# Patient Record
Sex: MALE | Race: WHITE | Employment: FULL TIME | URBAN - NONMETROPOLITAN AREA
[De-identification: names, ages, dates, MRNs, and addresses within clinical notes are randomized per-mention and may not be internally consistent; named-entity substitution may affect disease eponyms.]

---

## 2019-05-22 ENCOUNTER — HOSPITAL ENCOUNTER (OUTPATIENT)
Age: 49
Discharge: HOME | End: 2019-05-22
Payer: COMMERCIAL

## 2019-05-22 DIAGNOSIS — E11.9 TYPE 2 DIABETES MELLITUS WITHOUT COMPLICATION, WITHOUT LONG-TERM CURRENT USE OF INSULIN (HCC): Chronic | ICD-10-CM

## 2019-05-22 LAB
-: ABNORMAL
ALT SERPL-CCNC: 46 U/L (ref 5–41)
AMORPHOUS: ABNORMAL
ANION GAP SERPL CALCULATED.3IONS-SCNC: 11 MMOL/L (ref 9–17)
AST SERPL-CCNC: 44 U/L
BACTERIA: ABNORMAL
BILIRUBIN URINE: ABNORMAL
BUN BLDV-MCNC: 16 MG/DL (ref 6–20)
BUN/CREAT BLD: 25 (ref 9–20)
CALCIUM SERPL-MCNC: 9.6 MG/DL (ref 8.6–10.4)
CASTS UA: ABNORMAL /LPF
CHLORIDE BLD-SCNC: 95 MMOL/L (ref 98–107)
CHOLESTEROL, FASTING: 135 MG/DL
CHOLESTEROL/HDL RATIO: 2.8
CO2: 30 MMOL/L (ref 20–31)
COLOR: YELLOW
COMMENT UA: ABNORMAL
CREAT SERPL-MCNC: 0.63 MG/DL (ref 0.7–1.2)
CREATININE URINE: 144.7 MG/DL (ref 39–259)
CRYSTALS, UA: ABNORMAL /HPF
EPITHELIAL CELLS UA: ABNORMAL /HPF (ref 0–5)
GFR AFRICAN AMERICAN: >60 ML/MIN
GFR NON-AFRICAN AMERICAN: >60 ML/MIN
GFR SERPL CREATININE-BSD FRML MDRD: ABNORMAL ML/MIN/{1.73_M2}
GFR SERPL CREATININE-BSD FRML MDRD: ABNORMAL ML/MIN/{1.73_M2}
GLUCOSE FASTING: 208 MG/DL (ref 70–99)
GLUCOSE URINE: ABNORMAL
HDLC SERPL-MCNC: 48 MG/DL
KETONES, URINE: NEGATIVE
LDL CHOLESTEROL: 50 MG/DL (ref 0–130)
LEUKOCYTE ESTERASE, URINE: NEGATIVE
MICROALBUMIN/CREAT 24H UR: 1245 MG/L
MICROALBUMIN/CREAT UR-RTO: 860 MCG/MG CREAT
MUCUS: ABNORMAL
NITRITE, URINE: NEGATIVE
OTHER OBSERVATIONS UA: ABNORMAL
PH UA: 5.5 (ref 5–9)
POTASSIUM SERPL-SCNC: 4.8 MMOL/L (ref 3.7–5.3)
PROTEIN UA: ABNORMAL
RBC UA: ABNORMAL /HPF (ref 0–2)
RENAL EPITHELIAL, UA: ABNORMAL /HPF
SODIUM BLD-SCNC: 136 MMOL/L (ref 135–144)
SPECIFIC GRAVITY UA: >1.03 (ref 1.01–1.02)
TRICHOMONAS: ABNORMAL
TRIGLYCERIDE, FASTING: 183 MG/DL
TURBIDITY: CLEAR
URINE HGB: NEGATIVE
UROBILINOGEN, URINE: NORMAL
VLDLC SERPL CALC-MCNC: ABNORMAL MG/DL (ref 1–30)
WBC UA: ABNORMAL /HPF (ref 0–5)
YEAST: ABNORMAL

## 2019-05-22 PROCEDURE — 84450 TRANSFERASE (AST) (SGOT): CPT

## 2019-05-22 PROCEDURE — 80048 BASIC METABOLIC PNL TOTAL CA: CPT

## 2019-05-22 PROCEDURE — 81001 URINALYSIS AUTO W/SCOPE: CPT

## 2019-05-22 PROCEDURE — 84460 ALANINE AMINO (ALT) (SGPT): CPT

## 2019-05-22 PROCEDURE — 82570 ASSAY OF URINE CREATININE: CPT

## 2019-05-22 PROCEDURE — 82043 UR ALBUMIN QUANTITATIVE: CPT

## 2019-05-22 PROCEDURE — 80061 LIPID PANEL: CPT

## 2019-05-22 PROCEDURE — 36415 COLL VENOUS BLD VENIPUNCTURE: CPT

## 2019-09-04 ENCOUNTER — APPOINTMENT (OUTPATIENT)
Dept: GENERAL RADIOLOGY | Age: 49
DRG: 287 | End: 2019-09-04
Attending: INTERNAL MEDICINE
Payer: COMMERCIAL

## 2019-09-04 ENCOUNTER — HOSPITAL ENCOUNTER (INPATIENT)
Age: 49
LOS: 3 days | Discharge: HOME | DRG: 287 | End: 2019-09-07
Attending: INTERNAL MEDICINE | Admitting: INTERNAL MEDICINE
Payer: COMMERCIAL

## 2019-09-04 ENCOUNTER — HOSPITAL ENCOUNTER (OUTPATIENT)
Age: 49
Discharge: HOME | DRG: 287 | End: 2019-09-04
Payer: COMMERCIAL

## 2019-09-04 ENCOUNTER — HOSPITAL ENCOUNTER (OUTPATIENT)
Dept: CT IMAGING | Age: 49
Discharge: HOME | DRG: 287 | End: 2019-09-06
Payer: COMMERCIAL

## 2019-09-04 DIAGNOSIS — R10.31 ABDOMINAL PAIN, RIGHT LOWER QUADRANT: ICD-10-CM

## 2019-09-04 DIAGNOSIS — R10.31 RIGHT LOWER QUADRANT ABDOMINAL PAIN: ICD-10-CM

## 2019-09-04 PROBLEM — I50.31 ACUTE DIASTOLIC CHF (CONGESTIVE HEART FAILURE), NYHA CLASS 1 (HCC): Status: ACTIVE | Noted: 2019-09-04

## 2019-09-04 LAB
ABSOLUTE EOS #: 0.26 K/UL (ref 0–0.44)
ABSOLUTE IMMATURE GRANULOCYTE: <0.03 K/UL (ref 0–0.3)
ABSOLUTE LYMPH #: 2.05 K/UL (ref 1.1–3.7)
ABSOLUTE MONO #: 0.83 K/UL (ref 0.1–1.2)
ALBUMIN SERPL-MCNC: 4.4 G/DL (ref 3.5–5.2)
ALBUMIN/GLOBULIN RATIO: 1.4 (ref 1–2.5)
ALP BLD-CCNC: 66 U/L (ref 40–129)
ALT SERPL-CCNC: 47 U/L (ref 5–41)
ANION GAP SERPL CALCULATED.3IONS-SCNC: 13 MMOL/L (ref 9–17)
AST SERPL-CCNC: 40 U/L
BASOPHILS # BLD: 1 % (ref 0–2)
BASOPHILS ABSOLUTE: 0.04 K/UL (ref 0–0.2)
BILIRUB SERPL-MCNC: 0.44 MG/DL (ref 0.3–1.2)
BNP INTERPRETATION: ABNORMAL
BUN BLDV-MCNC: 16 MG/DL (ref 6–20)
BUN/CREAT BLD: 24 (ref 9–20)
CALCIUM SERPL-MCNC: 9.9 MG/DL (ref 8.6–10.4)
CHLORIDE BLD-SCNC: 100 MMOL/L (ref 98–107)
CO2: 28 MMOL/L (ref 20–31)
CREAT SERPL-MCNC: 0.68 MG/DL (ref 0.7–1.2)
DIFFERENTIAL TYPE: NORMAL
EOSINOPHILS RELATIVE PERCENT: 3 % (ref 1–4)
ESTIMATED AVERAGE GLUCOSE: 186 MG/DL
GFR AFRICAN AMERICAN: >60 ML/MIN
GFR NON-AFRICAN AMERICAN: >60 ML/MIN
GFR SERPL CREATININE-BSD FRML MDRD: ABNORMAL ML/MIN/{1.73_M2}
GFR SERPL CREATININE-BSD FRML MDRD: ABNORMAL ML/MIN/{1.73_M2}
GLUCOSE BLD-MCNC: 213 MG/DL (ref 70–99)
HBA1C MFR BLD: 8.1 % (ref 4.8–5.9)
HCT VFR BLD CALC: 43.8 % (ref 40.7–50.3)
HEMOGLOBIN: 13.4 G/DL (ref 13–17)
IMMATURE GRANULOCYTES: 0 %
LYMPHOCYTES # BLD: 24 % (ref 24–43)
MCH RBC QN AUTO: 31.1 PG (ref 25.2–33.5)
MCHC RBC AUTO-ENTMCNC: 30.6 G/DL (ref 28.4–34.8)
MCV RBC AUTO: 101.6 FL (ref 82.6–102.9)
MONOCYTES # BLD: 10 % (ref 3–12)
NRBC AUTOMATED: 0 PER 100 WBC
PDW BLD-RTO: 12.8 % (ref 11.8–14.4)
PLATELET # BLD: 235 K/UL (ref 138–453)
PLATELET ESTIMATE: NORMAL
PMV BLD AUTO: 11.4 FL (ref 8.1–13.5)
POTASSIUM SERPL-SCNC: 4.8 MMOL/L (ref 3.7–5.3)
PRO-BNP: 788 PG/ML
RBC # BLD: 4.31 M/UL (ref 4.21–5.77)
RBC # BLD: NORMAL 10*6/UL
SEG NEUTROPHILS: 62 % (ref 36–65)
SEGMENTED NEUTROPHILS ABSOLUTE COUNT: 5.19 K/UL (ref 1.5–8.1)
SODIUM BLD-SCNC: 141 MMOL/L (ref 135–144)
THYROXINE, FREE: 1.42 NG/DL (ref 0.93–1.7)
TOTAL PROTEIN: 7.6 G/DL (ref 6.4–8.3)
TROPONIN INTERP: NORMAL
TROPONIN T: <0.03 NG/ML
TROPONIN, HIGH SENSITIVITY: NORMAL NG/L (ref 0–22)
TSH SERPL DL<=0.05 MIU/L-ACNC: 4.43 MIU/L (ref 0.3–5)
WBC # BLD: 8.4 K/UL (ref 3.5–11.3)
WBC # BLD: NORMAL 10*3/UL

## 2019-09-04 PROCEDURE — 6360000004 HC RX CONTRAST MEDICATION: Performed by: INTERNAL MEDICINE

## 2019-09-04 PROCEDURE — 71046 X-RAY EXAM CHEST 2 VIEWS: CPT

## 2019-09-04 PROCEDURE — 36415 COLL VENOUS BLD VENIPUNCTURE: CPT

## 2019-09-04 PROCEDURE — 84484 ASSAY OF TROPONIN QUANT: CPT

## 2019-09-04 PROCEDURE — 83880 ASSAY OF NATRIURETIC PEPTIDE: CPT

## 2019-09-04 PROCEDURE — 1200000000 HC SEMI PRIVATE

## 2019-09-04 PROCEDURE — 6360000002 HC RX W HCPCS

## 2019-09-04 PROCEDURE — 2500000003 HC RX 250 WO HCPCS

## 2019-09-04 PROCEDURE — 85025 COMPLETE CBC W/AUTO DIFF WBC: CPT

## 2019-09-04 PROCEDURE — 83036 HEMOGLOBIN GLYCOSYLATED A1C: CPT

## 2019-09-04 PROCEDURE — 74177 CT ABD & PELVIS W/CONTRAST: CPT

## 2019-09-04 PROCEDURE — 84439 ASSAY OF FREE THYROXINE: CPT

## 2019-09-04 PROCEDURE — 80053 COMPREHEN METABOLIC PANEL: CPT

## 2019-09-04 PROCEDURE — 84443 ASSAY THYROID STIM HORMONE: CPT

## 2019-09-04 RX ORDER — ALLOPURINOL 300 MG/1
300 TABLET ORAL DAILY
Status: DISCONTINUED | OUTPATIENT
Start: 2019-09-04 | End: 2019-09-07 | Stop reason: HOSPADM

## 2019-09-04 RX ORDER — POTASSIUM CHLORIDE 20 MEQ/1
40 TABLET, EXTENDED RELEASE ORAL PRN
Status: DISCONTINUED | OUTPATIENT
Start: 2019-09-04 | End: 2019-09-07 | Stop reason: HOSPADM

## 2019-09-04 RX ORDER — ACETAMINOPHEN 325 MG/1
650 TABLET ORAL EVERY 4 HOURS PRN
Status: DISCONTINUED | OUTPATIENT
Start: 2019-09-04 | End: 2019-09-07 | Stop reason: HOSPADM

## 2019-09-04 RX ORDER — DEXTROSE MONOHYDRATE 50 MG/ML
100 INJECTION, SOLUTION INTRAVENOUS PRN
Status: DISCONTINUED | OUTPATIENT
Start: 2019-09-04 | End: 2019-09-07 | Stop reason: HOSPADM

## 2019-09-04 RX ORDER — COLCHICINE 0.6 MG/1
0.6 TABLET ORAL DAILY
Status: DISCONTINUED | OUTPATIENT
Start: 2019-09-04 | End: 2019-09-07 | Stop reason: HOSPADM

## 2019-09-04 RX ORDER — ATORVASTATIN CALCIUM 40 MG/1
40 TABLET, FILM COATED ORAL DAILY
Status: DISCONTINUED | OUTPATIENT
Start: 2019-09-04 | End: 2019-09-07 | Stop reason: HOSPADM

## 2019-09-04 RX ORDER — POTASSIUM CHLORIDE 7.45 MG/ML
10 INJECTION INTRAVENOUS PRN
Status: DISCONTINUED | OUTPATIENT
Start: 2019-09-04 | End: 2019-09-07 | Stop reason: HOSPADM

## 2019-09-04 RX ORDER — NICOTINE POLACRILEX 4 MG
15 LOZENGE BUCCAL PRN
Status: DISCONTINUED | OUTPATIENT
Start: 2019-09-04 | End: 2019-09-07 | Stop reason: HOSPADM

## 2019-09-04 RX ORDER — FUROSEMIDE 10 MG/ML
40 INJECTION INTRAMUSCULAR; INTRAVENOUS 2 TIMES DAILY
Status: DISCONTINUED | OUTPATIENT
Start: 2019-09-04 | End: 2019-09-06

## 2019-09-04 RX ORDER — ONDANSETRON 2 MG/ML
4 INJECTION INTRAMUSCULAR; INTRAVENOUS EVERY 6 HOURS PRN
Status: DISCONTINUED | OUTPATIENT
Start: 2019-09-04 | End: 2019-09-07 | Stop reason: HOSPADM

## 2019-09-04 RX ORDER — FAMOTIDINE 20 MG/1
20 TABLET, FILM COATED ORAL 2 TIMES DAILY
Status: DISCONTINUED | OUTPATIENT
Start: 2019-09-04 | End: 2019-09-07 | Stop reason: HOSPADM

## 2019-09-04 RX ORDER — LISINOPRIL 20 MG/1
20 TABLET ORAL DAILY
Status: DISCONTINUED | OUTPATIENT
Start: 2019-09-04 | End: 2019-09-07 | Stop reason: HOSPADM

## 2019-09-04 RX ORDER — SODIUM CHLORIDE 0.9 % (FLUSH) 0.9 %
10 SYRINGE (ML) INJECTION EVERY 12 HOURS SCHEDULED
Status: DISCONTINUED | OUTPATIENT
Start: 2019-09-04 | End: 2019-09-07 | Stop reason: HOSPADM

## 2019-09-04 RX ORDER — SODIUM CHLORIDE 0.9 % (FLUSH) 0.9 %
10 SYRINGE (ML) INJECTION PRN
Status: DISCONTINUED | OUTPATIENT
Start: 2019-09-04 | End: 2019-09-07 | Stop reason: HOSPADM

## 2019-09-04 RX ORDER — CARVEDILOL 12.5 MG/1
12.5 TABLET ORAL 2 TIMES DAILY
Status: DISCONTINUED | OUTPATIENT
Start: 2019-09-04 | End: 2019-09-06

## 2019-09-04 RX ORDER — MAGNESIUM SULFATE 1 G/100ML
1 INJECTION INTRAVENOUS PRN
Status: DISCONTINUED | OUTPATIENT
Start: 2019-09-04 | End: 2019-09-07 | Stop reason: HOSPADM

## 2019-09-04 RX ORDER — DEXTROSE MONOHYDRATE 25 G/50ML
12.5 INJECTION, SOLUTION INTRAVENOUS PRN
Status: DISCONTINUED | OUTPATIENT
Start: 2019-09-04 | End: 2019-09-07 | Stop reason: HOSPADM

## 2019-09-04 RX ADMIN — IOPAMIDOL 75 ML: 755 INJECTION, SOLUTION INTRAVENOUS at 16:38

## 2019-09-04 ASSESSMENT — PAIN SCALES - GENERAL: PAINLEVEL_OUTOF10: 0

## 2019-09-04 NOTE — H&P
no gallops  ABD:  Distended. Has anasarca of abdominal wall. Minimal pain to palpation  EXT:   2+ edema Le's  NEURO: negative  SKIN:  Mild erythema over abdominal wall      -----------------------------------------------------------------  Diagnostic Data: Reviewed    Assessment:      Hospital Problems:  Active Problems:    Acute diastolic CHF (congestive heart failure), NYHA class 1 ([facility])  Resolved Problems:    * No resolved hospital problems. *      All Problems:  Patient Active Problem List   Diagnosis    Type 2 diabetes mellitus without complication ([facility]    Essential hypertension    Obesity    Low testosterone    Acute diastolic CHF (congestive heart failure), NYHA class 1 ([facility]           Plan:       · This patient requires inpatient admission because of possible acute diastolic CHF   · Factors affecting the medical complexity of this patient include poorly controlled type 2 DM, hypertension , obesity  · Estimated length of stay is 3 days  · Cardiac work up, diuresis  ·   High risk medications: none    Jose D Glez MD  CORE MEASURES  · DVT prophylaxis: Lovenox  · Decubitus ulcer present on admission: No  · CODE STATUS: FULL CODE  · Nutrition Status: obese  · Physical therapy: No   · Old Charts reviewed:  Yes  · EKG Reviewed: Not done yet  · Advance Directive Addressed: Yes    Jose D Glez M.D.

## 2019-09-05 ENCOUNTER — APPOINTMENT (OUTPATIENT)
Dept: NON INVASIVE DIAGNOSTICS | Age: 49
DRG: 287 | End: 2019-09-05
Attending: INTERNAL MEDICINE
Payer: COMMERCIAL

## 2019-09-05 PROBLEM — I50.41 ACUTE COMBINED SYSTOLIC AND DIASTOLIC HF (HEART FAILURE), NYHA CLASS 4 (HCC): Status: ACTIVE | Noted: 2019-09-04

## 2019-09-05 LAB
ANION GAP SERPL CALCULATED.3IONS-SCNC: 14 MMOL/L (ref 9–17)
BUN BLDV-MCNC: 15 MG/DL (ref 6–20)
BUN/CREAT BLD: 21 (ref 9–20)
CALCIUM SERPL-MCNC: 10.2 MG/DL (ref 8.6–10.4)
CHLORIDE BLD-SCNC: 96 MMOL/L (ref 98–107)
CHOLESTEROL/HDL RATIO: 2.7
CHOLESTEROL: 96 MG/DL
CO2: 29 MMOL/L (ref 20–31)
CREAT SERPL-MCNC: 0.73 MG/DL (ref 0.7–1.2)
EKG ATRIAL RATE: 153 BPM
EKG Q-T INTERVAL: 340 MS
EKG QRS DURATION: 74 MS
EKG QTC CALCULATION (BAZETT): 468 MS
EKG R AXIS: 66 DEGREES
EKG T AXIS: 4 DEGREES
EKG VENTRICULAR RATE: 114 BPM
GFR AFRICAN AMERICAN: >60 ML/MIN
GFR NON-AFRICAN AMERICAN: >60 ML/MIN
GFR SERPL CREATININE-BSD FRML MDRD: ABNORMAL ML/MIN/{1.73_M2}
GFR SERPL CREATININE-BSD FRML MDRD: ABNORMAL ML/MIN/{1.73_M2}
GLUCOSE BLD-MCNC: 148 MG/DL (ref 74–100)
GLUCOSE BLD-MCNC: 160 MG/DL (ref 74–100)
GLUCOSE BLD-MCNC: 162 MG/DL (ref 74–100)
GLUCOSE BLD-MCNC: 170 MG/DL (ref 70–99)
GLUCOSE BLD-MCNC: 93 MG/DL (ref 74–100)
HDLC SERPL-MCNC: 36 MG/DL
LDL CHOLESTEROL: 45 MG/DL (ref 0–130)
LV EF: 40 %
LVEF MODALITY: NORMAL
MAGNESIUM: 1.5 MG/DL (ref 1.6–2.6)
POTASSIUM SERPL-SCNC: 4.5 MMOL/L (ref 3.7–5.3)
SODIUM BLD-SCNC: 139 MMOL/L (ref 135–144)
TRIGL SERPL-MCNC: 75 MG/DL
TROPONIN INTERP: NORMAL
TROPONIN T: <0.03 NG/ML
TROPONIN, HIGH SENSITIVITY: NORMAL NG/L (ref 0–22)
VLDLC SERPL CALC-MCNC: ABNORMAL MG/DL (ref 1–30)

## 2019-09-05 PROCEDURE — 93458 L HRT ARTERY/VENTRICLE ANGIO: CPT | Performed by: FAMILY MEDICINE

## 2019-09-05 PROCEDURE — 99254 IP/OBS CNSLTJ NEW/EST MOD 60: CPT | Performed by: INTERNAL MEDICINE

## 2019-09-05 PROCEDURE — 83735 ASSAY OF MAGNESIUM: CPT

## 2019-09-05 PROCEDURE — 6360000004 HC RX CONTRAST MEDICATION: Performed by: INTERNAL MEDICINE

## 2019-09-05 PROCEDURE — 80048 BASIC METABOLIC PNL TOTAL CA: CPT

## 2019-09-05 PROCEDURE — 82947 ASSAY GLUCOSE BLOOD QUANT: CPT

## 2019-09-05 PROCEDURE — B2151ZZ FLUOROSCOPY OF LEFT HEART USING LOW OSMOLAR CONTRAST: ICD-10-PCS | Performed by: FAMILY MEDICINE

## 2019-09-05 PROCEDURE — B2111ZZ FLUOROSCOPY OF MULTIPLE CORONARY ARTERIES USING LOW OSMOLAR CONTRAST: ICD-10-PCS | Performed by: FAMILY MEDICINE

## 2019-09-05 PROCEDURE — 6370000000 HC RX 637 (ALT 250 FOR IP): Performed by: INTERNAL MEDICINE

## 2019-09-05 PROCEDURE — 93010 ELECTROCARDIOGRAM REPORT: CPT | Performed by: FAMILY MEDICINE

## 2019-09-05 PROCEDURE — 6370000000 HC RX 637 (ALT 250 FOR IP): Performed by: FAMILY MEDICINE

## 2019-09-05 PROCEDURE — 6360000002 HC RX W HCPCS: Performed by: FAMILY MEDICINE

## 2019-09-05 PROCEDURE — C8929 TTE W OR WO FOL WCON,DOPPLER: HCPCS

## 2019-09-05 PROCEDURE — 6360000002 HC RX W HCPCS: Performed by: INTERNAL MEDICINE

## 2019-09-05 PROCEDURE — 93005 ELECTROCARDIOGRAM TRACING: CPT | Performed by: INTERNAL MEDICINE

## 2019-09-05 PROCEDURE — 2580000003 HC RX 258: Performed by: INTERNAL MEDICINE

## 2019-09-05 PROCEDURE — 2580000003 HC RX 258: Performed by: FAMILY MEDICINE

## 2019-09-05 PROCEDURE — 80061 LIPID PANEL: CPT

## 2019-09-05 PROCEDURE — 36415 COLL VENOUS BLD VENIPUNCTURE: CPT

## 2019-09-05 PROCEDURE — 1200000000 HC SEMI PRIVATE

## 2019-09-05 PROCEDURE — 4A023N7 MEASUREMENT OF CARDIAC SAMPLING AND PRESSURE, LEFT HEART, PERCUTANEOUS APPROACH: ICD-10-PCS | Performed by: FAMILY MEDICINE

## 2019-09-05 RX ORDER — METOPROLOL TARTRATE 5 MG/5ML
5 INJECTION INTRAVENOUS EVERY 6 HOURS PRN
Status: DISCONTINUED | OUTPATIENT
Start: 2019-09-05 | End: 2019-09-07 | Stop reason: HOSPADM

## 2019-09-05 RX ORDER — ACETAMINOPHEN 325 MG/1
650 TABLET ORAL EVERY 4 HOURS PRN
Status: CANCELLED | OUTPATIENT
Start: 2019-09-05

## 2019-09-05 RX ORDER — SODIUM CHLORIDE 0.9 % (FLUSH) 0.9 %
10 SYRINGE (ML) INJECTION PRN
Status: CANCELLED | OUTPATIENT
Start: 2019-09-05

## 2019-09-05 RX ORDER — METOPROLOL TARTRATE 50 MG/1
50 TABLET, FILM COATED ORAL 2 TIMES DAILY
Status: DISCONTINUED | OUTPATIENT
Start: 2019-09-05 | End: 2019-09-05

## 2019-09-05 RX ORDER — DIPHENHYDRAMINE HCL 25 MG
50 TABLET ORAL ONCE
Status: CANCELLED | OUTPATIENT
Start: 2019-09-05 | End: 2019-09-05

## 2019-09-05 RX ORDER — SODIUM CHLORIDE 9 MG/ML
INJECTION, SOLUTION INTRAVENOUS CONTINUOUS
Status: CANCELLED | OUTPATIENT
Start: 2019-09-05

## 2019-09-05 RX ORDER — NITROGLYCERIN 0.4 MG/1
0.4 TABLET SUBLINGUAL EVERY 5 MIN PRN
Status: CANCELLED | OUTPATIENT
Start: 2019-09-05

## 2019-09-05 RX ORDER — SODIUM CHLORIDE 0.9 % (FLUSH) 0.9 %
10 SYRINGE (ML) INJECTION EVERY 12 HOURS SCHEDULED
Status: CANCELLED | OUTPATIENT
Start: 2019-09-05

## 2019-09-05 RX ADMIN — FAMOTIDINE 20 MG: 20 TABLET ORAL at 08:19

## 2019-09-05 RX ADMIN — FUROSEMIDE 40 MG: 10 INJECTION, SOLUTION INTRAMUSCULAR; INTRAVENOUS at 08:19

## 2019-09-05 RX ADMIN — ENOXAPARIN SODIUM 40 MG: 40 INJECTION SUBCUTANEOUS at 08:19

## 2019-09-05 RX ADMIN — CARVEDILOL 12.5 MG: 12.5 TABLET, FILM COATED ORAL at 20:20

## 2019-09-05 RX ADMIN — Medication 10 ML: at 20:22

## 2019-09-05 RX ADMIN — ENOXAPARIN SODIUM 40 MG: 40 INJECTION SUBCUTANEOUS at 00:34

## 2019-09-05 RX ADMIN — CARVEDILOL 12.5 MG: 12.5 TABLET, FILM COATED ORAL at 08:19

## 2019-09-05 RX ADMIN — Medication 10 ML: at 08:21

## 2019-09-05 RX ADMIN — INSULIN LISPRO 2 UNITS: 100 INJECTION, SOLUTION INTRAVENOUS; SUBCUTANEOUS at 20:20

## 2019-09-05 RX ADMIN — FAMOTIDINE 20 MG: 20 TABLET ORAL at 20:20

## 2019-09-05 RX ADMIN — COLCHICINE 0.6 MG: 0.6 TABLET, FILM COATED ORAL at 08:19

## 2019-09-05 RX ADMIN — FUROSEMIDE 40 MG: 10 INJECTION, SOLUTION INTRAMUSCULAR; INTRAVENOUS at 20:20

## 2019-09-05 RX ADMIN — LISINOPRIL 20 MG: 20 TABLET ORAL at 08:19

## 2019-09-05 RX ADMIN — ALLOPURINOL 300 MG: 300 TABLET ORAL at 08:19

## 2019-09-05 RX ADMIN — INSULIN LISPRO 2 UNITS: 100 INJECTION, SOLUTION INTRAVENOUS; SUBCUTANEOUS at 08:18

## 2019-09-05 RX ADMIN — ATORVASTATIN CALCIUM 40 MG: 40 TABLET, FILM COATED ORAL at 08:19

## 2019-09-05 RX ADMIN — PERFLUTREN 1.65 MG: 6.52 INJECTION, SUSPENSION INTRAVENOUS at 11:10

## 2019-09-05 RX ADMIN — FUROSEMIDE 40 MG: 10 INJECTION, SOLUTION INTRAMUSCULAR; INTRAVENOUS at 00:33

## 2019-09-05 NOTE — PLAN OF CARE
PRN.   Goal: Patient will achieve/maintain normal respiratory rate/effort  Description  Respiratory rate and effort will be within normal limits for the patient  Outcome: Ongoing     Problem: HEMODYNAMIC STATUS  Goal: Patient has stable vital signs and fluid balance  Outcome: Ongoing     Problem: FLUID AND ELECTROLYTE IMBALANCE  Goal: Fluid and electrolyte balance are achieved/maintained  Outcome: Ongoing

## 2019-09-05 NOTE — CONSULTS
Palliative Care Notes    Reason for Consult:  Chronic disease support--CHf; hx diabetes    Patient Active Problem List   Diagnosis    Type 2 [facility] mellitus without complication [facility]    Essential hypertension    Obesity    Low testosterone    Acute diastolic CHF (congestive heart failure), NYHA class 1 ([facility]       Advance Directives:  Code status: Full Code  Patient has capacity for medical decisions: yes  Health Care Power of : yes  Living Will: yes        Pain Management:  The patient is not having any pain. Symptom Management:  Are there any other symptoms that are distressing to the patient or family that needs addressed? Anxiety:  situational                          Dyspnea:  acute dyspnea                          Fatigue:  denies                           Bladder function:  denies problems                          Bowel function:  denies problems other than abdominal swelling    Other:  none     Spiritual history/needs:   notified: n/a    Palliative Performance Scale:  __x_70%  Ambulation reduced; Some disease; Can't do normal job or work; intake normal or reduced; can do full self care; LOC full  ___60%  Ambulation reduced; Significant disease; Can't do hobbies/housework; intake normal or reduced; occasional assist; LOC full/confusion  ___50%  Mainly sit/lie; Extensive disease; Can't do any work; Considerable assist; intake normal or reduced; LOC full/confusion  ___40%  Mainly in bed; Extensive disease; Mainly assist; intake normal or reduced; LOC full/confusion   ___30%  Bed Bound; Extensive disease; Total care; intake reduced; LOCfull/confusion  ___20%  Bed Bound; Extensive disease; Total care; intake minimal; Drowsy/coma  ___10%  Bed Bound; Extensive disease; Total care; Mouth care only; Drowsy/coma  ___0       Death    Readmission Risk:      Notes:   Ping Peterson is sitting up in the chair during my visit.   He is admitted to the hospital with

## 2019-09-05 NOTE — CONSULTS
MEDICATIONS:  Prior to Admission medications    Medication Sig Start Date End Date Taking? Authorizing Provider   glimepiride (AMARYL) 2 MG tablet TAKE 1 TABLET TWICE A DAY 4/26/19  Yes Esmer Fermin MD   carvedilol (COREG) 12.5 MG tablet TAKE 1 TABLET TWICE A DAY 4/26/19  Yes Esmer Fermin MD   atorvastatin (LIPITOR) 40 MG tablet TAKE 1 TABLET DAILY 4/15/19  Yes Esmer Fermin MD   TRULICITY 9.42 FS/2.7WK SOPN inject 0.75 milligram subcutaneously every 7 days 4/1/19  Yes Esmer Fermin MD   lisinopril (PRINIVIL;ZESTRIL) 20 MG tablet TAKE 1 TABLET DAILY 4/1/19  Yes Esmer Fermin MD   amLODIPine (NORVASC) 5 MG tablet Take 1 tablet by mouth daily 1/22/19  Yes Esmer Fermin MD   carvedilol (COREG) 25 MG tablet Take 1 tablet by mouth 2 times daily 12/3/18  Yes Esmer Fermin MD   metFORMIN (GLUCOPHAGE) 1000 MG tablet TAKE 1 TABLET TWICE A DAY WITH MEALS 10/29/18  Yes Esmer Fermin MD   etodolac (LODINE) 400 MG tablet Take 400 mg by mouth daily 5/13/16  Yes Historical Provider, MD   colchicine (COLCRYS) 0.6 MG tablet Take 0.6 mg by mouth daily. Yes Historical Provider, MD   allopurinol (ZYLOPRIM) 300 MG tablet Take 300 mg by mouth daily. Yes Historical Provider, MD   testosterone cypionate (DEPOTESTOTERONE CYPIONATE) 200 MG/ML injection inject 1 milliliter EVERY MONTH 11/11/16   Esmer Fermin MD   valACYclovir (VALTREX) 1 G tablet Take 1 tablet by mouth 3 times daily. Patient taking differently:   Take 1,000 mg by mouth 3 times daily as needed  3/23/15   Esmer Fermin MD       FAMILY HISTORY: family history includes Cancer in his father; Heart Attack in his father. PHYSICAL EXAM:   /74   Pulse 79   Temp 97.4 °F (36.3 °C) (Temporal)   Resp 17   Ht 5' 11\" (1.803 m)   Wt (!) 305 lb 8 oz (138.6 kg)   SpO2 96%   BMI 42.61 kg/m²  Body mass index is 42.61 kg/m². Constitutional: He is oriented to person, place, and time. He appears well-developed and well-nourished. In no acute distress. that he would like to proceed with heart catheterization.  I also discussed the advantages and disadvantages of having his procedure performed here at [facility] vs. a larger hospital such as [facility]. Beyond the obvious advantage of convenience, I also explained potential disadvantages including the inability to have immediate cardiac stenting performed or the presence of on site CT surgical backup. However, because of the lack of significant high risk feature on his stress test, I did tell him I thought that in his particular case, it would likely be safe to perform the procedure here. Therefore, after considering the options, Mr. Chica Yip said he would prefer to have the procedure performed here at [facility] and so I scheduled the coronary angiography for today. Ada Kirk Essential Hypertension: Controlled  · Beta Blocker: Continue Carvedilol (Coreg) 12.5 mg twice daily. · ACE Inibitor/ARB: Continue lisinopril 20 mg daily. · Diuretics: Continue furosemide (Lasix) 40 mg 2 times daily. · Calcium Channel Blocker: Continue amlodipine (Norvasc) 5 mg once daily. · Type 2 Diabetes:  · Continue current treatment     Once again, thank you for allowing me to participate in this patients care. Please do not hesitate to contact me if I could be of any further assistance. Sincerely,  Garett Bragg MD, F.A.C.C. [facility] Cardiology Specialist    [address]  Phone: [phone], Fax: [phone]     I believe that the risk of significant morbidity and mortality related to the patient's current medical conditions are: intermediate-high.  minutes were spent with the patient and all of their questions were answered. The documentation recorded by the scribe, accurately and completely reflects the services I personally performed and the decisions made by me. Garett Bragg MD, F.A.C.C.  September 5, 2019

## 2019-09-05 NOTE — PLAN OF CARE
Problem: Falls - Risk of:  Goal: Will remain free from falls  Description  Will remain free from falls  9/5/2019 0914 by Mic Darling RN  Outcome: Ongoing  Note:   Patient is alert and oriented and has demonstrated the use of the call light for assistance before getting up. Education has been provided to defer the use of the bed alarm and/or restraint free alarm as the patient has shown competency in calling for assistance and verbalizing the risk. 9/5/2019 0246 by Bertrand Carmona RN  Outcome: Ongoing  Note:   Fall assessment completed daily. Bed in lowest position. Call light within reach. Falling star posted to outside of door. Fall risk sticker in place on ID band. Side rails up 2/4. Bed alarm on for safety. Patient ambulates independently. Will continue to monitor. Goal: Absence of physical injury  Description  Absence of physical injury  9/5/2019 0246 by Bertrand Carmona RN  Outcome: Ongoing     Problem: SAFETY  Goal: Free from accidental physical injury  9/5/2019 0914 by Mic Darling RN  Outcome: Ongoing  Note:   Pt with id band correct and in place. Bed in low positions, wheels locked, 2/4 siderails up. Call light in reach. 9/5/2019 0246 by Bertrand Carmona RN  Outcome: Ongoing  Note:   ID band correct and in place. Bed locked and in lowest position. Call light within reach. Patient free from injury. Will continue to monitor. Goal: Free from intentional harm  9/5/2019 0246 by Bertrand Carmona RN  Outcome: Ongoing     Problem: DAILY CARE  Goal: Daily care needs are met  9/5/2019 0914 by Mic Darling RN  Outcome: Ongoing  Note:   Daily cares assessed and needs met according to pt condition. Pt reminded to ask for help when needed. 9/5/2019 0246 by Bertrand Carmona RN  Outcome: Ongoing  Note:   Daily cares assessed and needs met according to patient condition. Patient reminded to ask for help when needed.       Problem: PAIN  Goal: Patient's pain/discomfort is admits to sob with exertion, none noted at rest. SpO2 maintained >92% on room air. 9/5/2019 0246 by Mario Courtney RN  Outcome: Ongoing     Problem: HEMODYNAMIC STATUS  Goal: Patient has stable vital signs and fluid balance  9/5/2019 0914 by Kelly Briscoe RN  Outcome: Ongoing  Note:   Vitals monitored routinely. Non pitting edema to BLE, lasix admin as ordered. Patient independent with toileting, I&O being monitored.    9/5/2019 0246 by Mario Courtney RN  Outcome: Ongoing     Problem: FLUID AND ELECTROLYTE IMBALANCE  Goal: Fluid and electrolyte balance are achieved/maintained  9/5/2019 0246 by Mario Courtney RN  Outcome: Ongoing

## 2019-09-06 ENCOUNTER — APPOINTMENT (OUTPATIENT)
Dept: NON INVASIVE DIAGNOSTICS | Age: 49
DRG: 287 | End: 2019-09-06
Attending: INTERNAL MEDICINE
Payer: COMMERCIAL

## 2019-09-06 ENCOUNTER — HOSPITAL ENCOUNTER (OUTPATIENT)
Dept: NON INVASIVE DIAGNOSTICS | Age: 49
Discharge: HOME | DRG: 287 | End: 2019-09-06
Attending: INTERNAL MEDICINE
Payer: COMMERCIAL

## 2019-09-06 PROBLEM — I50.43 ACUTE ON CHRONIC COMBINED SYSTOLIC AND DIASTOLIC CHF, NYHA CLASS 3 (HCC): Status: ACTIVE | Noted: 2019-09-04

## 2019-09-06 LAB
ANION GAP SERPL CALCULATED.3IONS-SCNC: 11 MMOL/L (ref 9–17)
BUN BLDV-MCNC: 12 MG/DL (ref 6–20)
BUN/CREAT BLD: 17 (ref 9–20)
CALCIUM SERPL-MCNC: 9.8 MG/DL (ref 8.6–10.4)
CHLORIDE BLD-SCNC: 96 MMOL/L (ref 98–107)
CO2: 31 MMOL/L (ref 20–31)
CREAT SERPL-MCNC: 0.69 MG/DL (ref 0.7–1.2)
EKG ATRIAL RATE: 120 BPM
EKG Q-T INTERVAL: 360 MS
EKG QRS DURATION: 78 MS
EKG QTC CALCULATION (BAZETT): 491 MS
EKG R AXIS: 36 DEGREES
EKG T AXIS: -22 DEGREES
EKG VENTRICULAR RATE: 112 BPM
GFR AFRICAN AMERICAN: >60 ML/MIN
GFR NON-AFRICAN AMERICAN: >60 ML/MIN
GFR SERPL CREATININE-BSD FRML MDRD: ABNORMAL ML/MIN/{1.73_M2}
GFR SERPL CREATININE-BSD FRML MDRD: ABNORMAL ML/MIN/{1.73_M2}
GLUCOSE BLD-MCNC: 111 MG/DL (ref 74–100)
GLUCOSE BLD-MCNC: 134 MG/DL (ref 74–100)
GLUCOSE BLD-MCNC: 155 MG/DL (ref 70–99)
GLUCOSE BLD-MCNC: 160 MG/DL (ref 74–100)
GLUCOSE BLD-MCNC: 206 MG/DL (ref 74–100)
LV EF: 40 %
LVEF MODALITY: NORMAL
MAGNESIUM: 1.5 MG/DL (ref 1.6–2.6)
POTASSIUM SERPL-SCNC: 4.3 MMOL/L (ref 3.7–5.3)
SODIUM BLD-SCNC: 138 MMOL/L (ref 135–144)

## 2019-09-06 PROCEDURE — 92960 CARDIOVERSION ELECTRIC EXT: CPT | Performed by: INTERNAL MEDICINE

## 2019-09-06 PROCEDURE — 6360000002 HC RX W HCPCS: Performed by: PHYSICIAN ASSISTANT

## 2019-09-06 PROCEDURE — 6370000000 HC RX 637 (ALT 250 FOR IP): Performed by: FAMILY MEDICINE

## 2019-09-06 PROCEDURE — 6370000000 HC RX 637 (ALT 250 FOR IP): Performed by: INTERNAL MEDICINE

## 2019-09-06 PROCEDURE — 2580000003 HC RX 258: Performed by: FAMILY MEDICINE

## 2019-09-06 PROCEDURE — 6360000002 HC RX W HCPCS: Performed by: INTERNAL MEDICINE

## 2019-09-06 PROCEDURE — 82947 ASSAY GLUCOSE BLOOD QUANT: CPT

## 2019-09-06 PROCEDURE — 93312 ECHO TRANSESOPHAGEAL: CPT

## 2019-09-06 PROCEDURE — 93010 ELECTROCARDIOGRAM REPORT: CPT | Performed by: FAMILY MEDICINE

## 2019-09-06 PROCEDURE — 93005 ELECTROCARDIOGRAM TRACING: CPT | Performed by: INTERNAL MEDICINE

## 2019-09-06 PROCEDURE — B24BZZ4 ULTRASONOGRAPHY OF HEART WITH AORTA, TRANSESOPHAGEAL: ICD-10-PCS | Performed by: INTERNAL MEDICINE

## 2019-09-06 PROCEDURE — 93312 ECHO TRANSESOPHAGEAL: CPT | Performed by: INTERNAL MEDICINE

## 2019-09-06 PROCEDURE — 36415 COLL VENOUS BLD VENIPUNCTURE: CPT

## 2019-09-06 PROCEDURE — 2000000000 HC ICU R&B

## 2019-09-06 PROCEDURE — 80048 BASIC METABOLIC PNL TOTAL CA: CPT

## 2019-09-06 PROCEDURE — 92960 CARDIOVERSION ELECTRIC EXT: CPT

## 2019-09-06 PROCEDURE — 6370000000 HC RX 637 (ALT 250 FOR IP): Performed by: PHYSICIAN ASSISTANT

## 2019-09-06 PROCEDURE — 2580000003 HC RX 258: Performed by: PHYSICIAN ASSISTANT

## 2019-09-06 PROCEDURE — 5A2204Z RESTORATION OF CARDIAC RHYTHM, SINGLE: ICD-10-PCS | Performed by: INTERNAL MEDICINE

## 2019-09-06 PROCEDURE — 83735 ASSAY OF MAGNESIUM: CPT

## 2019-09-06 PROCEDURE — 6360000002 HC RX W HCPCS: Performed by: FAMILY MEDICINE

## 2019-09-06 RX ORDER — SODIUM CHLORIDE 9 MG/ML
INJECTION, SOLUTION INTRAVENOUS CONTINUOUS
Status: DISCONTINUED | OUTPATIENT
Start: 2019-09-06 | End: 2019-09-06

## 2019-09-06 RX ORDER — SODIUM CHLORIDE 0.9 % (FLUSH) 0.9 %
10 SYRINGE (ML) INJECTION EVERY 12 HOURS SCHEDULED
Status: DISCONTINUED | OUTPATIENT
Start: 2019-09-06 | End: 2019-09-07 | Stop reason: HOSPADM

## 2019-09-06 RX ORDER — SODIUM CHLORIDE 9 MG/ML
INJECTION, SOLUTION INTRAVENOUS CONTINUOUS
Status: CANCELLED | OUTPATIENT
Start: 2019-09-06

## 2019-09-06 RX ORDER — METOPROLOL TARTRATE 50 MG/1
50 TABLET, FILM COATED ORAL 2 TIMES DAILY
Status: DISCONTINUED | OUTPATIENT
Start: 2019-09-06 | End: 2019-09-07 | Stop reason: HOSPADM

## 2019-09-06 RX ORDER — FUROSEMIDE 40 MG/1
40 TABLET ORAL 2 TIMES DAILY
Status: DISCONTINUED | OUTPATIENT
Start: 2019-09-06 | End: 2019-09-07 | Stop reason: HOSPADM

## 2019-09-06 RX ORDER — SODIUM CHLORIDE 0.9 % (FLUSH) 0.9 %
10 SYRINGE (ML) INJECTION EVERY 12 HOURS SCHEDULED
Status: CANCELLED | OUTPATIENT
Start: 2019-09-06

## 2019-09-06 RX ORDER — MIDAZOLAM HYDROCHLORIDE 1 MG/ML
INJECTION INTRAMUSCULAR; INTRAVENOUS DAILY PRN
Status: COMPLETED | OUTPATIENT
Start: 2019-09-06 | End: 2019-09-06

## 2019-09-06 RX ORDER — SODIUM CHLORIDE 0.9 % (FLUSH) 0.9 %
10 SYRINGE (ML) INJECTION PRN
Status: CANCELLED | OUTPATIENT
Start: 2019-09-06

## 2019-09-06 RX ORDER — SODIUM CHLORIDE 0.9 % (FLUSH) 0.9 %
10 SYRINGE (ML) INJECTION PRN
Status: DISCONTINUED | OUTPATIENT
Start: 2019-09-06 | End: 2019-09-07 | Stop reason: HOSPADM

## 2019-09-06 RX ORDER — MIDAZOLAM HYDROCHLORIDE 5 MG/ML
INJECTION INTRAMUSCULAR; INTRAVENOUS DAILY PRN
Status: COMPLETED | OUTPATIENT
Start: 2019-09-06 | End: 2019-09-06

## 2019-09-06 RX ORDER — FENTANYL CITRATE 50 UG/ML
INJECTION, SOLUTION INTRAMUSCULAR; INTRAVENOUS DAILY PRN
Status: COMPLETED | OUTPATIENT
Start: 2019-09-06 | End: 2019-09-06

## 2019-09-06 RX ADMIN — METOPROLOL TARTRATE 50 MG: 50 TABLET ORAL at 20:40

## 2019-09-06 RX ADMIN — FUROSEMIDE 40 MG: 40 TABLET ORAL at 17:21

## 2019-09-06 RX ADMIN — FAMOTIDINE 20 MG: 20 TABLET ORAL at 10:00

## 2019-09-06 RX ADMIN — MIDAZOLAM HYDROCHLORIDE 1 MG: 5 INJECTION, SOLUTION INTRAMUSCULAR; INTRAVENOUS at 13:50

## 2019-09-06 RX ADMIN — MIDAZOLAM HYDROCHLORIDE 1 MG: 5 INJECTION, SOLUTION INTRAMUSCULAR; INTRAVENOUS at 13:46

## 2019-09-06 RX ADMIN — MIDAZOLAM HYDROCHLORIDE 1 MG: 5 INJECTION, SOLUTION INTRAMUSCULAR; INTRAVENOUS at 13:55

## 2019-09-06 RX ADMIN — AMIODARONE HYDROCHLORIDE 1 MG/MIN: 50 INJECTION, SOLUTION INTRAVENOUS at 15:54

## 2019-09-06 RX ADMIN — BENZOCAINE 1 EACH: 200 SPRAY DENTAL; ORAL; PERIODONTAL at 13:46

## 2019-09-06 RX ADMIN — FAMOTIDINE 20 MG: 20 TABLET ORAL at 20:40

## 2019-09-06 RX ADMIN — FUROSEMIDE 40 MG: 10 INJECTION, SOLUTION INTRAMUSCULAR; INTRAVENOUS at 10:00

## 2019-09-06 RX ADMIN — APIXABAN 5 MG: 5 TABLET, FILM COATED ORAL at 20:40

## 2019-09-06 RX ADMIN — MIDAZOLAM HYDROCHLORIDE 1 MG: 5 INJECTION, SOLUTION INTRAMUSCULAR; INTRAVENOUS at 13:49

## 2019-09-06 RX ADMIN — Medication 10 ML: at 10:00

## 2019-09-06 RX ADMIN — AMIODARONE HYDROCHLORIDE 150 MG: 50 INJECTION, SOLUTION INTRAVENOUS at 15:39

## 2019-09-06 RX ADMIN — ATORVASTATIN CALCIUM 40 MG: 40 TABLET, FILM COATED ORAL at 10:30

## 2019-09-06 RX ADMIN — BENZOCAINE 1 EACH: 200 SPRAY DENTAL; ORAL; PERIODONTAL at 13:45

## 2019-09-06 RX ADMIN — COLCHICINE 0.6 MG: 0.6 TABLET, FILM COATED ORAL at 10:00

## 2019-09-06 RX ADMIN — CARVEDILOL 12.5 MG: 12.5 TABLET, FILM COATED ORAL at 10:00

## 2019-09-06 RX ADMIN — MIDAZOLAM HYDROCHLORIDE 1 MG: 1 INJECTION, SOLUTION INTRAMUSCULAR; INTRAVENOUS at 14:01

## 2019-09-06 RX ADMIN — AMIODARONE HYDROCHLORIDE 0.5 MG/MIN: 50 INJECTION, SOLUTION INTRAVENOUS at 22:06

## 2019-09-06 RX ADMIN — APIXABAN 5 MG: 5 TABLET, FILM COATED ORAL at 10:00

## 2019-09-06 RX ADMIN — ALLOPURINOL 300 MG: 300 TABLET ORAL at 10:00

## 2019-09-06 RX ADMIN — INSULIN LISPRO 2 UNITS: 100 INJECTION, SOLUTION INTRAVENOUS; SUBCUTANEOUS at 20:37

## 2019-09-06 RX ADMIN — LISINOPRIL 20 MG: 20 TABLET ORAL at 10:00

## 2019-09-06 RX ADMIN — FENTANYL CITRATE 25 MCG: 50 INJECTION INTRAMUSCULAR; INTRAVENOUS at 13:47

## 2019-09-06 ASSESSMENT — PAIN SCALES - GENERAL
PAINLEVEL_OUTOF10: 0

## 2019-09-06 NOTE — PLAN OF CARE
Problem: Falls - Risk of:  Goal: Will remain free from falls  Description  Will remain free from falls  Note:   Has not fallen thus far this hospital stay. Is alert and oriented to own limitations and knows how to use the call system to ask for assist.     Problem: PAIN  Goal: Patient's pain/discomfort is manageable  Note:   PT denies any pain.      Problem: OXYGENATION/RESPIRATORY FUNCTION  Goal: Patient will maintain patent airway  Note:   O2 sat on room air 95%     Problem: HEMODYNAMIC STATUS  Goal: Patient has stable vital signs and fluid balance  Note:   HR 97, continues A-fib B/P 135/84

## 2019-09-06 NOTE — SEDATION DOCUMENTATION
Noted to have apneic episodes while Dr Annie Childers still in room. Likely sleep apnea as per Dr Annie Childers. Saturations WNL. Will monitor.

## 2019-09-07 VITALS
DIASTOLIC BLOOD PRESSURE: 78 MMHG | TEMPERATURE: 97.6 F | OXYGEN SATURATION: 96 % | RESPIRATION RATE: 16 BRPM | HEART RATE: 89 BPM | WEIGHT: 291 LBS | HEIGHT: 71 IN | BODY MASS INDEX: 40.74 KG/M2 | SYSTOLIC BLOOD PRESSURE: 121 MMHG

## 2019-09-07 LAB
ANION GAP SERPL CALCULATED.3IONS-SCNC: 17 MMOL/L (ref 9–17)
BUN BLDV-MCNC: 12 MG/DL (ref 6–20)
BUN/CREAT BLD: 16 (ref 9–20)
CALCIUM SERPL-MCNC: 9.4 MG/DL (ref 8.6–10.4)
CHLORIDE BLD-SCNC: 94 MMOL/L (ref 98–107)
CO2: 26 MMOL/L (ref 20–31)
CREAT SERPL-MCNC: 0.73 MG/DL (ref 0.7–1.2)
GFR AFRICAN AMERICAN: >60 ML/MIN
GFR NON-AFRICAN AMERICAN: >60 ML/MIN
GFR SERPL CREATININE-BSD FRML MDRD: ABNORMAL ML/MIN/{1.73_M2}
GFR SERPL CREATININE-BSD FRML MDRD: ABNORMAL ML/MIN/{1.73_M2}
GLUCOSE BLD-MCNC: 141 MG/DL (ref 74–100)
GLUCOSE BLD-MCNC: 149 MG/DL (ref 70–99)
MAGNESIUM: 1.5 MG/DL (ref 1.6–2.6)
POTASSIUM SERPL-SCNC: 4 MMOL/L (ref 3.7–5.3)
SODIUM BLD-SCNC: 137 MMOL/L (ref 135–144)

## 2019-09-07 PROCEDURE — 6360000002 HC RX W HCPCS: Performed by: PHYSICIAN ASSISTANT

## 2019-09-07 PROCEDURE — 82947 ASSAY GLUCOSE BLOOD QUANT: CPT

## 2019-09-07 PROCEDURE — 36415 COLL VENOUS BLD VENIPUNCTURE: CPT

## 2019-09-07 PROCEDURE — 2580000003 HC RX 258: Performed by: PHYSICIAN ASSISTANT

## 2019-09-07 PROCEDURE — 80048 BASIC METABOLIC PNL TOTAL CA: CPT

## 2019-09-07 PROCEDURE — 6370000000 HC RX 637 (ALT 250 FOR IP): Performed by: PHYSICIAN ASSISTANT

## 2019-09-07 PROCEDURE — 83735 ASSAY OF MAGNESIUM: CPT

## 2019-09-07 RX ORDER — FUROSEMIDE 40 MG/1
40 TABLET ORAL 2 TIMES DAILY
Qty: 60 TABLET | Refills: 0 | Status: SHIPPED | OUTPATIENT
Start: 2019-09-07 | End: 2019-10-11

## 2019-09-07 RX ORDER — AMIODARONE HYDROCHLORIDE 200 MG/1
200 TABLET ORAL DAILY
Qty: 30 TABLET | Refills: 0 | Status: SHIPPED | OUTPATIENT
Start: 2019-09-08 | End: 2019-10-11

## 2019-09-07 RX ORDER — METOPROLOL TARTRATE 50 MG/1
50 TABLET, FILM COATED ORAL 2 TIMES DAILY
Qty: 60 TABLET | Refills: 3 | Status: SHIPPED | OUTPATIENT
Start: 2019-09-07 | End: 2019-09-19 | Stop reason: SDUPTHER

## 2019-09-07 RX ADMIN — INSULIN LISPRO 2 UNITS: 100 INJECTION, SOLUTION INTRAVENOUS; SUBCUTANEOUS at 07:58

## 2019-09-07 RX ADMIN — FAMOTIDINE 20 MG: 20 TABLET ORAL at 08:43

## 2019-09-07 RX ADMIN — ALLOPURINOL 300 MG: 300 TABLET ORAL at 08:44

## 2019-09-07 RX ADMIN — ATORVASTATIN CALCIUM 40 MG: 40 TABLET, FILM COATED ORAL at 08:43

## 2019-09-07 RX ADMIN — APIXABAN 5 MG: 5 TABLET, FILM COATED ORAL at 08:44

## 2019-09-07 RX ADMIN — METOPROLOL TARTRATE 50 MG: 50 TABLET ORAL at 08:43

## 2019-09-07 RX ADMIN — COLCHICINE 0.6 MG: 0.6 TABLET, FILM COATED ORAL at 08:43

## 2019-09-07 RX ADMIN — FUROSEMIDE 40 MG: 40 TABLET ORAL at 08:44

## 2019-09-07 RX ADMIN — LISINOPRIL 20 MG: 20 TABLET ORAL at 08:44

## 2019-09-07 RX ADMIN — AMIODARONE HYDROCHLORIDE 0.5 MG/MIN: 50 INJECTION, SOLUTION INTRAVENOUS at 00:30

## 2019-09-07 ASSESSMENT — PAIN SCALES - GENERAL
PAINLEVEL_OUTOF10: 0

## 2019-09-07 NOTE — DISCHARGE SUMMARY
Inessa Cerda M.D. Internal Medicine Discharge Summary    Patient ID:  Suzan Rosado  [id number]  1970    Admission date: 9/4/2019    Discharge date: 9/7/2019     Admitting Physician: Felicia Solomon MD     Primary Care Physician: Felicia Solomon MD     Primary Discharge Diagnoses:   Patient Active Problem List    Diagnosis Date Noted    Cardiomyopathy [facility])      Priority: High    Atrial fibrillation with RVR [facility])      Priority: High    Dyslipidemia     Obstructive sleep apnea     Acute on chronic combined systolic and diastolic CHF, NYHA class 3 ([facility] 09/04/2019    Low testosterone 05/17/2016    Type 2 diabetes mellitus without complication ([facility]     Essential hypertension     Obesity, Class III, BMI 40-49.9 (morbid obesity) [facility]        Additional Diagnoses:       Diagnosis Date    History of cardiac cath 09/05/2019    [facility]) [place]/Dr. Kimbrough/Right Radial     Obesity, unspecified     Type II or unspecified type diabetes mellitus without mention of complication, not stated as uncontrolled     Unspecified essential hypertension         Hospital Course: The patient was admitted for acute on chronic combined systolic and diastolic CHF. He was treated with IV lasix and his fluid overload improved over the course of his hospitalization. He lost 15 lbs, down from 306 to 291 during his hospitalization. Diagnostic heart cath showed minimal CAD. He also was found to be in atrial fibrillation with RVR and underwent INGA followed by unsuccessful cardioversion x 2. He was transferred to ICU for Amiodarone loading. Coreg was changed to metoprolol 50 mg po BID and he was started on Eliquis for stroke prophylaxis. At time of DC he is feeling much better with no CP, SOB or palpitations    Discharge Exam:  GEN:    Awake, alert and oriented x 3. no acute distress  EYES:   EOMI, pupils equal   NECK:  Supple. No lymphadenopathy. No carotid bruit  CVS:     irregularly irregular.   PULM:  CTA, no

## 2019-09-07 NOTE — PROGRESS NOTES
A-fib continues on monitor. Pt denies chest pain or palpitations. Pt educated on ssx of A-fib, treatement, diet for DM & Cardiac, weighing self daily when return home, and being diligent with checking and treating blood sugar, pt verb understanding. Will continue to monitor.
Admission EKG performed. Shows A-fib with RVR, Patient states he has no history of abnormal heart rhythm. Will contact doctor.
Discharge instructions given.
Discussed discharge plans with the patient. Patient is a 54year old male here with Acute diastolic CHF (congestive heart failure), NYHA class 1. He is alert and oriented. Patient is  and lives at home with his wife. He uses no medical equipment. Both him and his wife do the cooking and the cleaning. Patient manages his own medications and drives. His PCP is Dr. Thomas Anderson. He has medical insurance that helps with medications costs. He works a full time job. The discharge plan is home with home health for a 1 time visit for CHF education. He was given the list to choose a home health agency. Patient has a scale at home to weigh himself daily to track his weight for his dx of CHF. He says he has advance directives but not on file. Was ask to have someone to bring them in. SANTIAGO to monitor and assist with any needs or concerns as they arise.     SANTIAGO Salinas
Echocardiogram/Doppler with Definity done at bedside. Instructed on policies and procedures.
Patient arrived to floor independently. Patient shown to room. Patient changed into gown, VS obtained and navigator complete. Radiology and respiratory notified of patient arrival. Will start IV and obtain labs. Will administer IV lasix as ordered.
Patient will be doing the CHF clinic and not home health on discharge.   SANTIAGO Lyle
Per Remy Navarro RN Supervisor, cardioversion is now scheduled for 1300, to be done in pt's room 310.  Pt aware
Pt discharged home.
Pt sitting in chair and seen for diabetes education. Pt reports being diagnosed with Type 2 diabetes about 10 years ago. He is not sure if he had previous education for diabetes. Currently there is no specific meal plan and no physical activity being done. Meals are inconsistent with most days no breakfast. He is able to state the medication he takes for his diabetes but reports taking Amaryl in am before going to work which is concerning as he is not eating breakfast.  Denies episodes of hypoglycemia. Pt is given diabetes education folder. Reviewed signs and symptoms of hypoglycemia and how to treat and the importance of taking Amaryl with a meal based on how it works. Reviewed the importance of inspecting his feet and taking proper care of his feet. Discussed the link between Type 2 diabetes and heart disease. Highly encouraged him to attend diabetes education as an OP. He verbalizes he might be willing to do that as \"some things need to change. \"  Currently he is not monitoring glucose at home and not sure if he still has a meter and if it works if he still has it. All questions answered and is given office number to call with questions or concerns. If he is willing to come to education he will discuss with PCP.
Radiology informed patient is not here yet to be able to perform chest xray. Laboratory called and inquired about pending labs. They are able to add on some lab orders from earlier blood draw. Asked lab to send this nurse the stickers of remaining labs that need collected. As of 1926 patient has still not arrived for admission.
Received call from Dr. Savita Parada office, they inform that Dr. Ladonna Leung is ready to do cardioversion when we are. D/T ICU having no bed available, Gem Garcia RN Supervisor, notified by phone of such. Await call on plan for cardioversion. Lexus Tinsley RN, working ICU aware.
.  No calf tenderness. NEURO: Motor and sensory are intact  SKIN:  No rashes. No skin lesions. LINES: antecubital IV in place, no signs of infection  -----------------------------------------------------------------  Diagnostic Data:    · All lines, drips, IV sites and medications reviewed  · All available data reviewed  Lab Results   Component Value Date    WBC 8.4 09/04/2019    HGB 13.4 09/04/2019    .6 09/04/2019     09/04/2019     Lab Results   Component Value Date    SEGS 62 09/04/2019    LYMPHOPCT 24 09/04/2019    MONOPCT 10 09/04/2019    EOSRELPCT 3 09/04/2019    BASOPCT 1 09/04/2019    NEUTROABS 5.19 09/04/2019      Lab Results   Component Value Date    GLUCOSE 155 (H) 09/06/2019    BUN 12 09/06/2019    CREATININE 0.69 (L) 09/06/2019     09/06/2019    K 4.3 09/06/2019    CALCIUM 9.8 09/06/2019    CL 96 (L) 09/06/2019    CO2 31 09/06/2019     No results found for: LACTA    PROBLEM LIST:  Principal Problem:    [facility]  Active Problems:    Cardiomyopathy ([facility]    Obesity, Class III, BMI 40-49.9 (morbid obesity) ([facility])    Acute on chronic combined systolic and diastolic CHF, NYHA class 3 ([facility])    Dyslipidemia    Obstructive sleep apnea  Resolved Problems:    * No resolved hospital problems.  *      ASSESSMENT / PLAN:  Atrial fibrillation with RVR   · Failed cardioversion x 2  · Amiodarone loading   · Switched to Metoprolol 50 mg po BID yesterday  · Eliquis started for stroke prophylaxis  · Acute on chronic combined systolic and diastolic CHF due to non-ischemic cardiomyopathy  · Diagnostic heart cath showed minimal CAD  · Continue Lisinopril, Metoprolol, Lasix  · Hyperlipidemia  · Continue Atorvastatin  · Diabetes mellitus type 2  · Continue metformin, glimepiride   · Nutrition status: morbid obesity  · DVT prophylaxis: Eliquis  · High risk medications: amiodarone  · Total critical care time caring for this patient with life threatening, unstable organ failure,
None  · Anthropometric Measures:  · Ht: 5' 11\" (180.3 cm)   · Current Body Wt: 305 lb 8 oz (138.6 kg)  · Admission Body Wt: 306 lb 11.2 oz (139.1 kg)  · Usual Body Wt: 295 lb (133.8 kg)(2018)  · % Weight Change:  ,  stable per historical weights  · Ideal Body Wt: 172 lb (78 kg), % Ideal Body 178%  · BMI Classification: BMI > or equal to 40.0 Obese Class III   Lab Results   Component Value Date    LABA1C 8.1 09/04/2019     Recent Labs     09/04/19  1543 09/05/19  0525    139   K 4.8 4.5    96*   CO2 28 29   BUN 16 15   CREATININE 0.68* 0.73   GLUCOSE 213* 170*   ALT 47*  --    ALKPHOS 66  --    GFR                            Lab Results   Component Value Date    LABALBU 4.4 09/04/2019      Recent Labs     09/05/19  0808   POCGLU 148*     Lab Results   Component Value Date    TRIG 75 09/05/2019    HDL 36 09/05/2019     Nutrition Interventions:   Modify current diet(CC 4 carbs per meal, 2 gm sodium diet)  Continued Inpatient Monitoring, Education Needed, Coordination of Care    Nutrition Evaluation:   · Evaluation: Goals set   · Goals: PO > 75% of meals    · Monitoring: Meal Intake, Weight, Pertinent Labs, I&O, Patient/Family Education      Electronically signed by Praveen Gardiner RD, LD on 9/5/19 at 8:35 AM    Contact Number: [phone]
kg)  · Admission Body Wt: 306 lb 11.2 oz (139.1 kg)  · Usual Body Wt: 295 lb (133.8 kg)(2018)  · % Weight Change:  ,  Weight loss trend back towards UBW with diuresis  · Ideal Body Wt: 172 lb (78 kg), % Ideal Body 175%  · BMI Classification: BMI > or equal to 40.0 Obese Class III  Recent Labs     09/05/19  0808 09/05/19  1111 09/05/19  1639 09/05/19  1956 09/06/19  0704   POCGLU 148* 162* 93 160* 160*     Recent Labs     09/04/19  1543 09/05/19  0525 09/06/19  0540    139 138   K 4.8 4.5 4.3    96* 96*   CO2 28 29 31   BUN 16 15 12   CREATININE 0.68* 0.73 0.69*   GLUCOSE 213* 170* 155*   ALT 47*  --   --    ALKPHOS 66  --   --    GFR                                       Lab Results   Component Value Date    LABALBU 4.4 09/04/2019      Nutrition Interventions:   Modify current diet(CC 4 carbs per meal, 2 gm sodium diet)  Continued Inpatient Monitoring, Coordination of Care, Education declined    Nutrition Evaluation:   · Evaluation: Progressing toward goals   · Goals: PO > 75% of meals    · Monitoring: Meal Intake, Weight, Pertinent Labs, I&O, Patient/Family Education      Electronically signed by Jus Kwong RD, LD on 9/6/19 at 10:24 AM    Contact Number: [phone]

## 2019-09-11 LAB — GLUCOSE BLD-MCNC: 178 MG/DL (ref 74–100)

## 2019-09-19 ENCOUNTER — HOSPITAL ENCOUNTER (OUTPATIENT)
Dept: CARDIAC CATH/INVASIVE PROCEDURES | Age: 49
Discharge: HOME | End: 2019-09-19
Payer: COMMERCIAL

## 2019-09-19 ENCOUNTER — OFFICE VISIT (OUTPATIENT)
Dept: CARDIOLOGY | Age: 49
End: 2019-09-19
Payer: COMMERCIAL

## 2019-09-19 VITALS
WEIGHT: 299 LBS | OXYGEN SATURATION: 93 % | RESPIRATION RATE: 16 BRPM | TEMPERATURE: 97.1 F | HEIGHT: 71 IN | DIASTOLIC BLOOD PRESSURE: 97 MMHG | HEART RATE: 110 BPM | SYSTOLIC BLOOD PRESSURE: 105 MMHG | BODY MASS INDEX: 41.86 KG/M2

## 2019-09-19 VITALS
HEIGHT: 71 IN | RESPIRATION RATE: 20 BRPM | HEART RATE: 139 BPM | BODY MASS INDEX: 41.86 KG/M2 | WEIGHT: 299 LBS | OXYGEN SATURATION: 95 % | SYSTOLIC BLOOD PRESSURE: 106 MMHG | DIASTOLIC BLOOD PRESSURE: 75 MMHG

## 2019-09-19 DIAGNOSIS — I48.19 PERSISTENT ATRIAL FIBRILLATION WITH RAPID VENTRICULAR RESPONSE (HCC): ICD-10-CM

## 2019-09-19 DIAGNOSIS — Z79.01 CHRONIC ANTICOAGULATION: ICD-10-CM

## 2019-09-19 DIAGNOSIS — I48.91 ATRIAL FIBRILLATION WITH RVR (HCC): Primary | ICD-10-CM

## 2019-09-19 DIAGNOSIS — E78.5 DYSLIPIDEMIA: ICD-10-CM

## 2019-09-19 DIAGNOSIS — G47.33 OSA (OBSTRUCTIVE SLEEP APNEA): ICD-10-CM

## 2019-09-19 DIAGNOSIS — E66.01 SEVERE OBESITY (BMI >= 40) (HCC): ICD-10-CM

## 2019-09-19 DIAGNOSIS — I42.8 NICM (NONISCHEMIC CARDIOMYOPATHY) (HCC): ICD-10-CM

## 2019-09-19 DIAGNOSIS — I10 ESSENTIAL HYPERTENSION: ICD-10-CM

## 2019-09-19 LAB
EKG ATRIAL RATE: 63 BPM
EKG Q-T INTERVAL: 344 MS
EKG QRS DURATION: 84 MS
EKG QTC CALCULATION (BAZETT): 463 MS
EKG R AXIS: 45 DEGREES
EKG T AXIS: 16 DEGREES
EKG VENTRICULAR RATE: 109 BPM

## 2019-09-19 PROCEDURE — 93010 ELECTROCARDIOGRAM REPORT: CPT | Performed by: INTERNAL MEDICINE

## 2019-09-19 PROCEDURE — 2580000003 HC RX 258: Performed by: FAMILY MEDICINE

## 2019-09-19 PROCEDURE — 6360000002 HC RX W HCPCS: Performed by: FAMILY MEDICINE

## 2019-09-19 PROCEDURE — 93005 ELECTROCARDIOGRAM TRACING: CPT | Performed by: FAMILY MEDICINE

## 2019-09-19 PROCEDURE — 99214 OFFICE O/P EST MOD 30 MIN: CPT | Performed by: INTERNAL MEDICINE

## 2019-09-19 PROCEDURE — 92960 CARDIOVERSION ELECTRIC EXT: CPT | Performed by: FAMILY MEDICINE

## 2019-09-19 PROCEDURE — 93000 ELECTROCARDIOGRAM COMPLETE: CPT | Performed by: INTERNAL MEDICINE

## 2019-09-19 RX ORDER — FENTANYL CITRATE 50 UG/ML
INJECTION, SOLUTION INTRAMUSCULAR; INTRAVENOUS
Status: COMPLETED | OUTPATIENT
Start: 2019-09-19 | End: 2019-09-19

## 2019-09-19 RX ORDER — MIDAZOLAM HYDROCHLORIDE 1 MG/ML
INJECTION INTRAMUSCULAR; INTRAVENOUS
Status: COMPLETED | OUTPATIENT
Start: 2019-09-19 | End: 2019-09-19

## 2019-09-19 RX ORDER — NITROGLYCERIN 0.4 MG/1
0.4 TABLET SUBLINGUAL EVERY 5 MIN PRN
Status: DISCONTINUED | OUTPATIENT
Start: 2019-09-19 | End: 2019-09-20 | Stop reason: HOSPADM

## 2019-09-19 RX ORDER — DIGOXIN 250 MCG
250 TABLET ORAL DAILY
Qty: 30 TABLET | Refills: 3 | Status: SHIPPED | OUTPATIENT
Start: 2019-09-19 | End: 2019-10-11

## 2019-09-19 RX ORDER — SODIUM CHLORIDE 0.9 % (FLUSH) 0.9 %
10 SYRINGE (ML) INJECTION PRN
Status: DISCONTINUED | OUTPATIENT
Start: 2019-09-19 | End: 2019-09-20 | Stop reason: HOSPADM

## 2019-09-19 RX ORDER — SODIUM CHLORIDE 9 MG/ML
INJECTION, SOLUTION INTRAVENOUS CONTINUOUS
Status: DISCONTINUED | OUTPATIENT
Start: 2019-09-19 | End: 2019-09-20 | Stop reason: HOSPADM

## 2019-09-19 RX ORDER — METOPROLOL TARTRATE 100 MG/1
100 TABLET ORAL 2 TIMES DAILY
Qty: 60 TABLET | Refills: 11 | Status: SHIPPED | OUTPATIENT
Start: 2019-09-19 | End: 2020-01-27 | Stop reason: SDUPTHER

## 2019-09-19 RX ORDER — SODIUM CHLORIDE 0.9 % (FLUSH) 0.9 %
10 SYRINGE (ML) INJECTION EVERY 12 HOURS SCHEDULED
Status: DISCONTINUED | OUTPATIENT
Start: 2019-09-19 | End: 2019-09-20 | Stop reason: HOSPADM

## 2019-09-19 RX ORDER — ACETAMINOPHEN 325 MG/1
650 TABLET ORAL EVERY 4 HOURS PRN
Status: DISCONTINUED | OUTPATIENT
Start: 2019-09-19 | End: 2019-09-20 | Stop reason: HOSPADM

## 2019-09-19 RX ORDER — DIPHENHYDRAMINE HCL 50 MG
50 CAPSULE ORAL ONCE
Status: DISCONTINUED | OUTPATIENT
Start: 2019-09-19 | End: 2019-09-20 | Stop reason: HOSPADM

## 2019-09-19 RX ADMIN — MIDAZOLAM HYDROCHLORIDE 2 MG: 1 INJECTION, SOLUTION INTRAMUSCULAR; INTRAVENOUS at 16:38

## 2019-09-19 RX ADMIN — FENTANYL CITRATE 25 MCG: 50 INJECTION INTRAMUSCULAR; INTRAVENOUS at 16:30

## 2019-09-19 RX ADMIN — MIDAZOLAM HYDROCHLORIDE 2 MG: 1 INJECTION, SOLUTION INTRAMUSCULAR; INTRAVENOUS at 16:31

## 2019-09-19 RX ADMIN — SODIUM CHLORIDE: 9 INJECTION, SOLUTION INTRAVENOUS at 16:12

## 2019-09-19 RX ADMIN — MIDAZOLAM HYDROCHLORIDE 2 MG: 1 INJECTION, SOLUTION INTRAMUSCULAR; INTRAVENOUS at 16:33

## 2019-09-19 RX ADMIN — MIDAZOLAM HYDROCHLORIDE 2 MG: 1 INJECTION, SOLUTION INTRAMUSCULAR; INTRAVENOUS at 16:30

## 2019-09-19 NOTE — PATIENT INSTRUCTIONS
SURVEY:    You may be receiving a survey from [facility] regarding your visit today. Please complete the survey to enable us to provide the highest quality of care to you and your family. If you cannot score us a very good on any question, please call the office to discuss how we could have made your experience a very good one. Thank you.

## 2019-09-19 NOTE — PROGRESS NOTES
[methylprednisolone] REVIEW OF SYSTEMS: 14 systems were reviewed. Pertinent positives and negatives as above, all else negative. Past Surgical History:   Procedure Laterality Date    ANTERIOR CRUCIATE LIGAMENT REPAIR  2009    CARDIOVERSION  09/06/2019    Dr Douglas Mccoy [place]/200 joules then 250 joules- unsuccessful    TRANSESOPHAGEAL ECHOCARDIOGRAM  09/06/2019    Dr Heidi Oliveira cardioversion    Social History:  Social History     Tobacco Use    Smoking status: Never Smoker    Smokeless tobacco: Current User     Types: Chew   Substance Use Topics    Alcohol use: Yes     Alcohol/week: 10.0 standard drinks     Types: 10 Cans of beer per week    Drug use: Not on file        CURRENT MEDICATIONS:        Outpatient Medications Marked as Taking for the 9/19/19 encounter (Office Visit) with Lorene Cardenas MD   Medication Sig Dispense Refill    apixaban (ELIQUIS) 5 MG TABS tablet Take 1 tablet by mouth 2 times daily 60 tablet 0    furosemide (LASIX) 40 MG tablet Take 1 tablet by mouth 2 times daily 60 tablet 0    metoprolol tartrate (LOPRESSOR) 50 MG tablet Take 1 tablet by mouth 2 times daily 60 tablet 3    amiodarone (CORDARONE) 200 MG tablet Take 1 tablet by mouth daily 30 tablet 0    glimepiride (AMARYL) 2 MG tablet TAKE 1 TABLET TWICE A  tablet 3    atorvastatin (LIPITOR) 40 MG tablet TAKE 1 TABLET DAILY 90 tablet 3    TRULICITY 7.47 GV/8.8YL SOPN inject 0.75 milligram subcutaneously every 7 days 2 mL 5    lisinopril (PRINIVIL;ZESTRIL) 20 MG tablet TAKE 1 TABLET DAILY 90 tablet 3    amLODIPine (NORVASC) 5 MG tablet Take 1 tablet by mouth daily 90 tablet 3    metFORMIN (GLUCOPHAGE) 1000 MG tablet TAKE 1 TABLET TWICE A DAY WITH MEALS 180 tablet 3    etodolac (LODINE) 400 MG tablet Take 400 mg by mouth daily      colchicine (COLCRYS) 0.6 MG tablet Take 0.6 mg by mouth daily.  allopurinol (ZYLOPRIM) 300 MG tablet Take 300 mg by mouth daily.       valACYclovir

## 2019-09-19 NOTE — H&P
I have examined the patient and have reviewed their H&P the from 9/19/19. I agree with the assessment and plan related to the patients cardiac condition and confirm that there are no significant changes in the patients condition.

## 2019-09-21 NOTE — PROGRESS NOTES
Another unsuccessful cardioversion. Please have him monitor heart rate and blood pressure and call me in 1 to 2 weeks. Continue current therapy for the next 4 weeks. I can definitely see him sooner if needed.

## 2019-10-11 RX ORDER — FUROSEMIDE 40 MG/1
40 TABLET ORAL 2 TIMES DAILY
Qty: 180 TABLET | Refills: 3 | Status: SHIPPED | OUTPATIENT
Start: 2019-10-11 | End: 2020-01-27 | Stop reason: SDUPTHER

## 2019-10-11 RX ORDER — DIGOXIN 250 MCG
250 TABLET ORAL DAILY
Qty: 90 TABLET | Refills: 3 | Status: ON HOLD | OUTPATIENT
Start: 2019-10-11 | End: 2019-12-24 | Stop reason: HOSPADM

## 2019-10-11 RX ORDER — AMIODARONE HYDROCHLORIDE 200 MG/1
200 TABLET ORAL DAILY
Qty: 90 TABLET | Refills: 3 | Status: SHIPPED | OUTPATIENT
Start: 2019-10-11 | End: 2020-01-27 | Stop reason: SDUPTHER

## 2019-10-24 ENCOUNTER — OFFICE VISIT (OUTPATIENT)
Dept: CARDIOLOGY | Age: 49
End: 2019-10-24
Payer: COMMERCIAL

## 2019-10-24 VITALS
OXYGEN SATURATION: 94 % | HEART RATE: 118 BPM | BODY MASS INDEX: 42.2 KG/M2 | RESPIRATION RATE: 16 BRPM | WEIGHT: 294.8 LBS | DIASTOLIC BLOOD PRESSURE: 78 MMHG | SYSTOLIC BLOOD PRESSURE: 133 MMHG | HEIGHT: 70 IN

## 2019-10-24 DIAGNOSIS — E66.01 SEVERE OBESITY (BMI >= 40) (HCC): ICD-10-CM

## 2019-10-24 DIAGNOSIS — Z79.01 CHRONIC ANTICOAGULATION: ICD-10-CM

## 2019-10-24 DIAGNOSIS — E78.2 MIXED HYPERLIPIDEMIA: ICD-10-CM

## 2019-10-24 DIAGNOSIS — G47.33 OSA (OBSTRUCTIVE SLEEP APNEA): ICD-10-CM

## 2019-10-24 DIAGNOSIS — I48.19 PERSISTENT ATRIAL FIBRILLATION WITH RAPID VENTRICULAR RESPONSE (HCC): Primary | ICD-10-CM

## 2019-10-24 DIAGNOSIS — I50.42 CHRONIC COMBINED SYSTOLIC AND DIASTOLIC CONGESTIVE HEART FAILURE (HCC): ICD-10-CM

## 2019-10-24 PROCEDURE — 99214 OFFICE O/P EST MOD 30 MIN: CPT | Performed by: INTERNAL MEDICINE

## 2019-10-24 PROCEDURE — 93000 ELECTROCARDIOGRAM COMPLETE: CPT | Performed by: INTERNAL MEDICINE

## 2019-10-31 ENCOUNTER — HOSPITAL ENCOUNTER (OUTPATIENT)
Dept: SLEEP CENTER | Age: 49
Discharge: HOME | End: 2019-10-31
Payer: COMMERCIAL

## 2019-10-31 DIAGNOSIS — G47.33 OSA (OBSTRUCTIVE SLEEP APNEA): ICD-10-CM

## 2019-10-31 PROCEDURE — 95810 POLYSOM 6/> YRS 4/> PARAM: CPT

## 2019-11-05 LAB — STATUS: NORMAL

## 2019-11-13 ENCOUNTER — OFFICE VISIT (OUTPATIENT)
Dept: CARDIOLOGY CLINIC | Age: 49
End: 2019-11-13
Payer: COMMERCIAL

## 2019-11-13 ENCOUNTER — TELEPHONE (OUTPATIENT)
Dept: CARDIOLOGY CLINIC | Age: 49
End: 2019-11-13

## 2019-11-13 VITALS
HEIGHT: 71 IN | DIASTOLIC BLOOD PRESSURE: 103 MMHG | BODY MASS INDEX: 41.61 KG/M2 | HEART RATE: 75 BPM | WEIGHT: 297.2 LBS | SYSTOLIC BLOOD PRESSURE: 155 MMHG

## 2019-11-13 DIAGNOSIS — I48.91 ATRIAL FIBRILLATION WITH RVR (HCC): Primary | ICD-10-CM

## 2019-11-13 PROCEDURE — 93000 ELECTROCARDIOGRAM COMPLETE: CPT | Performed by: INTERNAL MEDICINE

## 2019-11-13 PROCEDURE — 99205 OFFICE O/P NEW HI 60 MIN: CPT | Performed by: INTERNAL MEDICINE

## 2019-11-13 ASSESSMENT — ENCOUNTER SYMPTOMS
DIARRHEA: 0
SORE THROAT: 0
NAUSEA: 0
BLURRED VISION: 0
RIGHT EYE: 0
ABDOMINAL PAIN: 0
COUGH: 0
WHEEZING: 0
LEFT EYE: 0
CONSTIPATION: 0
SHORTNESS OF BREATH: 0
VOMITING: 0
BACK PAIN: 0
DOUBLE VISION: 0

## 2019-11-18 ENCOUNTER — HOSPITAL ENCOUNTER (OUTPATIENT)
Dept: SLEEP CENTER | Age: 49
Discharge: HOME | End: 2019-11-18
Payer: COMMERCIAL

## 2019-11-18 DIAGNOSIS — G47.33 OSA (OBSTRUCTIVE SLEEP APNEA): ICD-10-CM

## 2019-11-18 PROCEDURE — 95811 POLYSOM 6/>YRS CPAP 4/> PARM: CPT

## 2019-11-22 LAB — STATUS: NORMAL

## 2019-12-09 ENCOUNTER — HOSPITAL ENCOUNTER (OUTPATIENT)
Dept: CT IMAGING | Age: 49
Discharge: HOME | End: 2019-12-09
Payer: COMMERCIAL

## 2019-12-09 DIAGNOSIS — I48.91 ATRIAL FIBRILLATION WITH RVR (HCC): ICD-10-CM

## 2019-12-09 LAB — POC CREATININE WHOLE BLOOD: 0.8 MG/DL (ref 0.5–1.2)

## 2019-12-09 PROCEDURE — 6360000004 HC RX CONTRAST MEDICATION: Performed by: INTERNAL MEDICINE

## 2019-12-09 PROCEDURE — 82565 ASSAY OF CREATININE: CPT

## 2019-12-09 PROCEDURE — 75572 CT HRT W/3D IMAGE: CPT

## 2019-12-09 RX ADMIN — IOPAMIDOL 80 ML: 755 INJECTION, SOLUTION INTRAVENOUS at 14:33

## 2019-12-12 ENCOUNTER — TELEPHONE (OUTPATIENT)
Dept: CARDIOLOGY CLINIC | Age: 49
End: 2019-12-12

## 2019-12-20 ENCOUNTER — HOSPITAL ENCOUNTER (INPATIENT)
Age: 49
LOS: 1 days | Discharge: HOME | DRG: 310 | End: 2019-12-20
Attending: INTERNAL MEDICINE | Admitting: INTERNAL MEDICINE
Payer: COMMERCIAL

## 2019-12-20 VITALS
TEMPERATURE: 97.9 F | OXYGEN SATURATION: 97 % | HEIGHT: 71 IN | SYSTOLIC BLOOD PRESSURE: 132 MMHG | HEART RATE: 93 BPM | WEIGHT: 294.9 LBS | RESPIRATION RATE: 16 BRPM | DIASTOLIC BLOOD PRESSURE: 90 MMHG | BODY MASS INDEX: 41.28 KG/M2

## 2019-12-20 PROBLEM — I48.91 A-FIB (HCC): Status: ACTIVE | Noted: 2019-12-20

## 2019-12-20 PROCEDURE — 2140000000 HC CCU INTERMEDIATE R&B

## 2019-12-20 PROCEDURE — 99220 PR INITIAL OBSERVATION CARE/DAY 70 MINUTES: CPT | Performed by: INTERNAL MEDICINE

## 2019-12-20 PROCEDURE — G0379 DIRECT REFER HOSPITAL OBSERV: HCPCS

## 2019-12-20 PROCEDURE — G0378 HOSPITAL OBSERVATION PER HR: HCPCS

## 2019-12-20 PROCEDURE — 2709999900 HC NON-CHARGEABLE SUPPLY

## 2019-12-20 RX ORDER — INSULIN GLARGINE 100 [IU]/ML
0.25 INJECTION, SOLUTION SUBCUTANEOUS NIGHTLY
Status: DISCONTINUED | OUTPATIENT
Start: 2019-12-20 | End: 2019-12-20 | Stop reason: HOSPADM

## 2019-12-20 RX ORDER — NICOTINE POLACRILEX 4 MG
15 LOZENGE BUCCAL PRN
Status: DISCONTINUED | OUTPATIENT
Start: 2019-12-20 | End: 2019-12-20 | Stop reason: HOSPADM

## 2019-12-20 RX ORDER — DEXTROSE MONOHYDRATE 25 G/50ML
12.5 INJECTION, SOLUTION INTRAVENOUS PRN
Status: DISCONTINUED | OUTPATIENT
Start: 2019-12-20 | End: 2019-12-20 | Stop reason: HOSPADM

## 2019-12-20 RX ORDER — GLIMEPIRIDE 2 MG/1
2 TABLET ORAL 2 TIMES DAILY WITH MEALS
Status: DISCONTINUED | OUTPATIENT
Start: 2019-12-20 | End: 2019-12-20 | Stop reason: HOSPADM

## 2019-12-20 RX ORDER — DIGOXIN 250 MCG
250 TABLET ORAL DAILY
Status: DISCONTINUED | OUTPATIENT
Start: 2019-12-21 | End: 2019-12-20 | Stop reason: HOSPADM

## 2019-12-20 RX ORDER — FUROSEMIDE 40 MG/1
40 TABLET ORAL 2 TIMES DAILY
Status: DISCONTINUED | OUTPATIENT
Start: 2019-12-20 | End: 2019-12-20 | Stop reason: HOSPADM

## 2019-12-20 RX ORDER — SOTALOL HYDROCHLORIDE 80 MG/1
80 TABLET ORAL 2 TIMES DAILY
Status: DISCONTINUED | OUTPATIENT
Start: 2019-12-20 | End: 2019-12-20 | Stop reason: HOSPADM

## 2019-12-20 RX ORDER — ALLOPURINOL 300 MG/1
300 TABLET ORAL DAILY
Status: DISCONTINUED | OUTPATIENT
Start: 2019-12-21 | End: 2019-12-20 | Stop reason: HOSPADM

## 2019-12-20 RX ORDER — LISINOPRIL 20 MG/1
20 TABLET ORAL DAILY
Status: DISCONTINUED | OUTPATIENT
Start: 2019-12-21 | End: 2019-12-20 | Stop reason: HOSPADM

## 2019-12-20 RX ORDER — METOPROLOL TARTRATE 100 MG/1
100 TABLET ORAL 2 TIMES DAILY
Status: DISCONTINUED | OUTPATIENT
Start: 2019-12-20 | End: 2019-12-20 | Stop reason: HOSPADM

## 2019-12-20 RX ORDER — COLCHICINE 0.6 MG/1
0.6 TABLET ORAL DAILY
Status: DISCONTINUED | OUTPATIENT
Start: 2019-12-21 | End: 2019-12-20 | Stop reason: HOSPADM

## 2019-12-20 RX ORDER — DEXTROSE MONOHYDRATE 50 MG/ML
100 INJECTION, SOLUTION INTRAVENOUS PRN
Status: DISCONTINUED | OUTPATIENT
Start: 2019-12-20 | End: 2019-12-20 | Stop reason: HOSPADM

## 2019-12-22 ENCOUNTER — PREP FOR PROCEDURE (OUTPATIENT)
Dept: CARDIOLOGY | Age: 49
End: 2019-12-22

## 2019-12-22 RX ORDER — SODIUM CHLORIDE 0.9 % (FLUSH) 0.9 %
10 SYRINGE (ML) INJECTION EVERY 12 HOURS SCHEDULED
Status: CANCELLED | OUTPATIENT
Start: 2019-12-22

## 2019-12-22 RX ORDER — SODIUM CHLORIDE 9 MG/ML
INJECTION, SOLUTION INTRAVENOUS CONTINUOUS
Status: CANCELLED | OUTPATIENT
Start: 2019-12-22

## 2019-12-22 RX ORDER — SODIUM CHLORIDE 0.9 % (FLUSH) 0.9 %
10 SYRINGE (ML) INJECTION PRN
Status: CANCELLED | OUTPATIENT
Start: 2019-12-22

## 2019-12-23 ENCOUNTER — ANESTHESIA (OUTPATIENT)
Dept: CARDIAC CATH/INVASIVE PROCEDURES | Age: 49
End: 2019-12-23
Payer: COMMERCIAL

## 2019-12-23 ENCOUNTER — APPOINTMENT (OUTPATIENT)
Dept: CARDIAC CATH/INVASIVE PROCEDURES | Age: 49
End: 2019-12-23
Payer: COMMERCIAL

## 2019-12-23 ENCOUNTER — HOSPITAL ENCOUNTER (OUTPATIENT)
Dept: INPATIENT UNIT | Age: 49
Discharge: HOME | End: 2019-12-24
Attending: INTERNAL MEDICINE | Admitting: INTERNAL MEDICINE
Payer: COMMERCIAL

## 2019-12-23 ENCOUNTER — ANESTHESIA EVENT (OUTPATIENT)
Dept: CARDIAC CATH/INVASIVE PROCEDURES | Age: 49
End: 2019-12-23
Payer: COMMERCIAL

## 2019-12-23 VITALS — SYSTOLIC BLOOD PRESSURE: 110 MMHG | TEMPERATURE: 96.8 F | OXYGEN SATURATION: 91 % | DIASTOLIC BLOOD PRESSURE: 71 MMHG

## 2019-12-23 PROBLEM — I48.91 ATRIAL FIBRILLATION (HCC): Status: ACTIVE | Noted: 2019-12-23

## 2019-12-23 LAB
ANION GAP SERPL CALCULATED.3IONS-SCNC: 15 MEQ/L (ref 8–16)
AVERAGE GLUCOSE: 207 MG/DL (ref 70–126)
BUN BLDV-MCNC: 16 MG/DL (ref 7–22)
CALCIUM SERPL-MCNC: 9.5 MG/DL (ref 8.5–10.5)
CHLORIDE BLD-SCNC: 98 MEQ/L (ref 98–111)
CO2: 28 MEQ/L (ref 23–33)
CREAT SERPL-MCNC: 0.7 MG/DL (ref 0.4–1.2)
DIGOXIN LEVEL: 0.8 NG/ML (ref 0.5–2)
ERYTHROCYTE [DISTWIDTH] IN BLOOD BY AUTOMATED COUNT: 13.8 % (ref 11.5–14.5)
ERYTHROCYTE [DISTWIDTH] IN BLOOD BY AUTOMATED COUNT: 49.1 FL (ref 35–45)
GFR SERPL CREATININE-BSD FRML MDRD: > 90 ML/MIN/1.73M2
GLUCOSE BLD-MCNC: 121 MG/DL (ref 70–108)
GLUCOSE BLD-MCNC: 143 MG/DL (ref 70–108)
GLUCOSE BLD-MCNC: 181 MG/DL (ref 70–108)
HBA1C MFR BLD: 8.9 % (ref 4.4–6.4)
HCT VFR BLD CALC: 45.7 % (ref 42–52)
HEMOGLOBIN: 15 GM/DL (ref 14–18)
MCH RBC QN AUTO: 31.4 PG (ref 26–33)
MCHC RBC AUTO-ENTMCNC: 32.8 GM/DL (ref 32.2–35.5)
MCV RBC AUTO: 95.8 FL (ref 80–94)
PLATELET # BLD: 174 THOU/MM3 (ref 130–400)
PMV BLD AUTO: 11.6 FL (ref 9.4–12.4)
POC ACTIVATED CLOTTING TIME KAOLIN: 125 SECONDS (ref 1–150)
POC ACTIVATED CLOTTING TIME KAOLIN: 175 SECONDS (ref 1–150)
POC ACTIVATED CLOTTING TIME KAOLIN: 235 SECONDS (ref 1–150)
POC ACTIVATED CLOTTING TIME KAOLIN: 268 SECONDS (ref 1–150)
POTASSIUM SERPL-SCNC: 4.3 MEQ/L (ref 3.5–5.2)
RBC # BLD: 4.77 MILL/MM3 (ref 4.7–6.1)
SODIUM BLD-SCNC: 141 MEQ/L (ref 135–145)
WBC # BLD: 7.8 THOU/MM3 (ref 4.8–10.8)

## 2019-12-23 PROCEDURE — 93613 INTRACARDIAC EPHYS 3D MAPG: CPT

## 2019-12-23 PROCEDURE — 94760 N-INVAS EAR/PLS OXIMETRY 1: CPT

## 2019-12-23 PROCEDURE — 85027 COMPLETE CBC AUTOMATED: CPT

## 2019-12-23 PROCEDURE — 93656 COMPRE EP EVAL ABLTJ ATR FIB: CPT

## 2019-12-23 PROCEDURE — 2580000003 HC RX 258: Performed by: PHYSICIAN ASSISTANT

## 2019-12-23 PROCEDURE — 93656 COMPRE EP EVAL ABLTJ ATR FIB: CPT | Performed by: INTERNAL MEDICINE

## 2019-12-23 PROCEDURE — 93662 INTRACARDIAC ECG (ICE): CPT | Performed by: INTERNAL MEDICINE

## 2019-12-23 PROCEDURE — 80162 ASSAY OF DIGOXIN TOTAL: CPT

## 2019-12-23 PROCEDURE — C1769 GUIDE WIRE: HCPCS

## 2019-12-23 PROCEDURE — C1730 CATH, EP, 19 OR FEW ELECT: HCPCS

## 2019-12-23 PROCEDURE — 2709999900 HC NON-CHARGEABLE SUPPLY

## 2019-12-23 PROCEDURE — C1733 CATH, EP, OTHR THAN COOL-TIP: HCPCS

## 2019-12-23 PROCEDURE — 3700000000 HC ANESTHESIA ATTENDED CARE

## 2019-12-23 PROCEDURE — C1894 INTRO/SHEATH, NON-LASER: HCPCS

## 2019-12-23 PROCEDURE — 2500000003 HC RX 250 WO HCPCS: Performed by: ANESTHESIOLOGY

## 2019-12-23 PROCEDURE — 3700000001 HC ADD 15 MINUTES (ANESTHESIA)

## 2019-12-23 PROCEDURE — 92960 CARDIOVERSION ELECTRIC EXT: CPT | Performed by: INTERNAL MEDICINE

## 2019-12-23 PROCEDURE — 82948 REAGENT STRIP/BLOOD GLUCOSE: CPT

## 2019-12-23 PROCEDURE — 7100000000 HC PACU RECOVERY - FIRST 15 MIN

## 2019-12-23 PROCEDURE — 2580000003 HC RX 258: Performed by: INTERNAL MEDICINE

## 2019-12-23 PROCEDURE — 83036 HEMOGLOBIN GLYCOSYLATED A1C: CPT

## 2019-12-23 PROCEDURE — 2500000003 HC RX 250 WO HCPCS

## 2019-12-23 PROCEDURE — 6360000002 HC RX W HCPCS: Performed by: ANESTHESIOLOGY

## 2019-12-23 PROCEDURE — 2720000010 HC SURG SUPPLY STERILE

## 2019-12-23 PROCEDURE — 93613 INTRACARDIAC EPHYS 3D MAPG: CPT | Performed by: INTERNAL MEDICINE

## 2019-12-23 PROCEDURE — C1759 CATH, INTRA ECHOCARDIOGRAPHY: HCPCS

## 2019-12-23 PROCEDURE — 6360000004 HC RX CONTRAST MEDICATION: Performed by: INTERNAL MEDICINE

## 2019-12-23 PROCEDURE — 7100000001 HC PACU RECOVERY - ADDTL 15 MIN

## 2019-12-23 PROCEDURE — 80048 BASIC METABOLIC PNL TOTAL CA: CPT

## 2019-12-23 PROCEDURE — 36415 COLL VENOUS BLD VENIPUNCTURE: CPT

## 2019-12-23 PROCEDURE — 85347 COAGULATION TIME ACTIVATED: CPT

## 2019-12-23 PROCEDURE — 6360000002 HC RX W HCPCS

## 2019-12-23 PROCEDURE — 93662 INTRACARDIAC ECG (ICE): CPT

## 2019-12-23 RX ORDER — SODIUM CHLORIDE 0.9 % (FLUSH) 0.9 %
10 SYRINGE (ML) INJECTION PRN
Status: DISCONTINUED | OUTPATIENT
Start: 2019-12-23 | End: 2019-12-23 | Stop reason: SDUPTHER

## 2019-12-23 RX ORDER — FENTANYL CITRATE 50 UG/ML
INJECTION, SOLUTION INTRAMUSCULAR; INTRAVENOUS PRN
Status: DISCONTINUED | OUTPATIENT
Start: 2019-12-23 | End: 2019-12-23 | Stop reason: SDUPTHER

## 2019-12-23 RX ORDER — INSULIN GLARGINE 100 [IU]/ML
0.25 INJECTION, SOLUTION SUBCUTANEOUS NIGHTLY
Status: DISCONTINUED | OUTPATIENT
Start: 2019-12-23 | End: 2019-12-23

## 2019-12-23 RX ORDER — AMIODARONE HYDROCHLORIDE 200 MG/1
200 TABLET ORAL DAILY
Status: DISCONTINUED | OUTPATIENT
Start: 2019-12-23 | End: 2019-12-24 | Stop reason: HOSPADM

## 2019-12-23 RX ORDER — ACYCLOVIR 200 MG/1
400 CAPSULE ORAL 3 TIMES DAILY
Status: DISCONTINUED | OUTPATIENT
Start: 2019-12-23 | End: 2019-12-23

## 2019-12-23 RX ORDER — ACETAMINOPHEN 325 MG/1
650 TABLET ORAL EVERY 4 HOURS PRN
Status: DISCONTINUED | OUTPATIENT
Start: 2019-12-23 | End: 2019-12-24 | Stop reason: HOSPADM

## 2019-12-23 RX ORDER — ONDANSETRON 2 MG/ML
4 INJECTION INTRAMUSCULAR; INTRAVENOUS
Status: DISCONTINUED | OUTPATIENT
Start: 2019-12-23 | End: 2019-12-23

## 2019-12-23 RX ORDER — PROTAMINE SULFATE 10 MG/ML
INJECTION, SOLUTION INTRAVENOUS PRN
Status: DISCONTINUED | OUTPATIENT
Start: 2019-12-23 | End: 2019-12-23 | Stop reason: SDUPTHER

## 2019-12-23 RX ORDER — HEPARIN SODIUM 1000 [USP'U]/ML
INJECTION, SOLUTION INTRAVENOUS; SUBCUTANEOUS PRN
Status: DISCONTINUED | OUTPATIENT
Start: 2019-12-23 | End: 2019-12-23 | Stop reason: SDUPTHER

## 2019-12-23 RX ORDER — COLCHICINE 0.6 MG/1
0.6 TABLET ORAL DAILY
Status: DISCONTINUED | OUTPATIENT
Start: 2019-12-23 | End: 2019-12-24 | Stop reason: HOSPADM

## 2019-12-23 RX ORDER — SODIUM CHLORIDE 0.9 % (FLUSH) 0.9 %
10 SYRINGE (ML) INJECTION EVERY 12 HOURS SCHEDULED
Status: DISCONTINUED | OUTPATIENT
Start: 2019-12-23 | End: 2019-12-23 | Stop reason: SDUPTHER

## 2019-12-23 RX ORDER — LABETALOL 20 MG/4 ML (5 MG/ML) INTRAVENOUS SYRINGE
5 EVERY 10 MIN PRN
Status: DISCONTINUED | OUTPATIENT
Start: 2019-12-23 | End: 2019-12-23

## 2019-12-23 RX ORDER — SUCCINYLCHOLINE/SOD CL,ISO/PF 200MG/10ML
SYRINGE (ML) INTRAVENOUS PRN
Status: DISCONTINUED | OUTPATIENT
Start: 2019-12-23 | End: 2019-12-23 | Stop reason: SDUPTHER

## 2019-12-23 RX ORDER — GLIMEPIRIDE 2 MG/1
2 TABLET ORAL 2 TIMES DAILY WITH MEALS
Status: DISCONTINUED | OUTPATIENT
Start: 2019-12-23 | End: 2019-12-24 | Stop reason: HOSPADM

## 2019-12-23 RX ORDER — SODIUM CHLORIDE 0.9 % (FLUSH) 0.9 %
10 SYRINGE (ML) INJECTION PRN
Status: DISCONTINUED | OUTPATIENT
Start: 2019-12-23 | End: 2019-12-24 | Stop reason: HOSPADM

## 2019-12-23 RX ORDER — PHENYLEPHRINE HCL IN 0.9% NACL 1 MG/10 ML
SYRINGE (ML) INTRAVENOUS PRN
Status: DISCONTINUED | OUTPATIENT
Start: 2019-12-23 | End: 2019-12-23 | Stop reason: SDUPTHER

## 2019-12-23 RX ORDER — SODIUM CHLORIDE 9 MG/ML
INJECTION, SOLUTION INTRAVENOUS CONTINUOUS
Status: DISCONTINUED | OUTPATIENT
Start: 2019-12-23 | End: 2019-12-23

## 2019-12-23 RX ORDER — LISINOPRIL 20 MG/1
20 TABLET ORAL DAILY
Status: DISCONTINUED | OUTPATIENT
Start: 2019-12-23 | End: 2019-12-24 | Stop reason: HOSPADM

## 2019-12-23 RX ORDER — METOPROLOL TARTRATE 100 MG/1
100 TABLET ORAL 2 TIMES DAILY
Status: DISCONTINUED | OUTPATIENT
Start: 2019-12-23 | End: 2019-12-24 | Stop reason: HOSPADM

## 2019-12-23 RX ORDER — FUROSEMIDE 40 MG/1
40 TABLET ORAL 2 TIMES DAILY
Status: DISCONTINUED | OUTPATIENT
Start: 2019-12-23 | End: 2019-12-24 | Stop reason: HOSPADM

## 2019-12-23 RX ORDER — DIGOXIN 250 MCG
250 TABLET ORAL DAILY
Status: DISCONTINUED | OUTPATIENT
Start: 2019-12-23 | End: 2019-12-24 | Stop reason: HOSPADM

## 2019-12-23 RX ORDER — ALLOPURINOL 300 MG/1
300 TABLET ORAL DAILY
Status: DISCONTINUED | OUTPATIENT
Start: 2019-12-23 | End: 2019-12-24 | Stop reason: HOSPADM

## 2019-12-23 RX ORDER — FENTANYL CITRATE 50 UG/ML
50 INJECTION, SOLUTION INTRAMUSCULAR; INTRAVENOUS EVERY 5 MIN PRN
Status: DISCONTINUED | OUTPATIENT
Start: 2019-12-23 | End: 2019-12-23

## 2019-12-23 RX ORDER — SODIUM CHLORIDE 9 MG/ML
INJECTION, SOLUTION INTRAVENOUS CONTINUOUS
Status: DISCONTINUED | OUTPATIENT
Start: 2019-12-23 | End: 2019-12-23 | Stop reason: SDUPTHER

## 2019-12-23 RX ORDER — PROPOFOL 10 MG/ML
INJECTION, EMULSION INTRAVENOUS PRN
Status: DISCONTINUED | OUTPATIENT
Start: 2019-12-23 | End: 2019-12-23 | Stop reason: SDUPTHER

## 2019-12-23 RX ORDER — DEXTROSE MONOHYDRATE 50 MG/ML
100 INJECTION, SOLUTION INTRAVENOUS PRN
Status: DISCONTINUED | OUTPATIENT
Start: 2019-12-23 | End: 2019-12-24 | Stop reason: HOSPADM

## 2019-12-23 RX ORDER — SODIUM CHLORIDE 0.9 % (FLUSH) 0.9 %
10 SYRINGE (ML) INJECTION EVERY 12 HOURS SCHEDULED
Status: DISCONTINUED | OUTPATIENT
Start: 2019-12-23 | End: 2019-12-24 | Stop reason: HOSPADM

## 2019-12-23 RX ORDER — NICOTINE POLACRILEX 4 MG
15 LOZENGE BUCCAL PRN
Status: DISCONTINUED | OUTPATIENT
Start: 2019-12-23 | End: 2019-12-24 | Stop reason: HOSPADM

## 2019-12-23 RX ORDER — FENTANYL CITRATE 50 UG/ML
25 INJECTION, SOLUTION INTRAMUSCULAR; INTRAVENOUS EVERY 5 MIN PRN
Status: DISCONTINUED | OUTPATIENT
Start: 2019-12-23 | End: 2019-12-23

## 2019-12-23 RX ORDER — DEXTROSE MONOHYDRATE 25 G/50ML
12.5 INJECTION, SOLUTION INTRAVENOUS PRN
Status: DISCONTINUED | OUTPATIENT
Start: 2019-12-23 | End: 2019-12-24 | Stop reason: HOSPADM

## 2019-12-23 RX ADMIN — SODIUM CHLORIDE: 9 INJECTION, SOLUTION INTRAVENOUS at 07:59

## 2019-12-23 RX ADMIN — HEPARIN SODIUM 15000 UNITS: 1000 INJECTION, SOLUTION INTRAVENOUS; SUBCUTANEOUS at 09:11

## 2019-12-23 RX ADMIN — PROPOFOL 30 MG: 10 INJECTION, EMULSION INTRAVENOUS at 09:30

## 2019-12-23 RX ADMIN — COLCHICINE 0.6 MG: 0.6 TABLET ORAL at 15:00

## 2019-12-23 RX ADMIN — Medication 200 MG: at 08:07

## 2019-12-23 RX ADMIN — Medication 100 MCG: at 09:16

## 2019-12-23 RX ADMIN — Medication 100 MCG: at 09:50

## 2019-12-23 RX ADMIN — FUROSEMIDE 40 MG: 40 TABLET ORAL at 17:52

## 2019-12-23 RX ADMIN — GLIMEPIRIDE 2 MG: 2 TABLET ORAL at 17:53

## 2019-12-23 RX ADMIN — Medication 100 MCG: at 09:08

## 2019-12-23 RX ADMIN — Medication 100 MCG: at 09:26

## 2019-12-23 RX ADMIN — SODIUM CHLORIDE: 9 INJECTION, SOLUTION INTRAVENOUS at 07:10

## 2019-12-23 RX ADMIN — METOPROLOL TARTRATE 100 MG: 100 TABLET ORAL at 21:18

## 2019-12-23 RX ADMIN — Medication 100 MCG: at 08:56

## 2019-12-23 RX ADMIN — HEPARIN SODIUM 5000 UNITS: 1000 INJECTION, SOLUTION INTRAVENOUS; SUBCUTANEOUS at 09:45

## 2019-12-23 RX ADMIN — Medication 100 MCG: at 09:37

## 2019-12-23 RX ADMIN — Medication 100 MCG: at 08:21

## 2019-12-23 RX ADMIN — SODIUM CHLORIDE, PRESERVATIVE FREE 10 ML: 5 INJECTION INTRAVENOUS at 21:21

## 2019-12-23 RX ADMIN — AMIODARONE HYDROCHLORIDE 200 MG: 200 TABLET ORAL at 14:59

## 2019-12-23 RX ADMIN — Medication 250 MCG: at 15:01

## 2019-12-23 RX ADMIN — Medication 200 MCG: at 08:25

## 2019-12-23 RX ADMIN — PROTAMINE SULFATE 50 MG: 10 INJECTION, SOLUTION INTRAVENOUS at 10:19

## 2019-12-23 RX ADMIN — PROPOFOL 150 MG: 10 INJECTION, EMULSION INTRAVENOUS at 08:07

## 2019-12-23 RX ADMIN — Medication 100 MCG: at 09:57

## 2019-12-23 RX ADMIN — LISINOPRIL 20 MG: 20 TABLET ORAL at 15:02

## 2019-12-23 RX ADMIN — FENTANYL CITRATE 100 MCG: 50 INJECTION INTRAMUSCULAR; INTRAVENOUS at 08:07

## 2019-12-23 RX ADMIN — IOPAMIDOL 20 ML: 755 INJECTION, SOLUTION INTRAVENOUS at 10:33

## 2019-12-23 RX ADMIN — METOPROLOL TARTRATE 100 MG: 100 TABLET ORAL at 15:03

## 2019-12-23 RX ADMIN — Medication 100 MCG: at 08:48

## 2019-12-23 RX ADMIN — HEPARIN SODIUM 5000 UNITS: 1000 INJECTION, SOLUTION INTRAVENOUS; SUBCUTANEOUS at 09:32

## 2019-12-23 ASSESSMENT — PULMONARY FUNCTION TESTS
PIF_VALUE: 16
PIF_VALUE: 22
PIF_VALUE: 22
PIF_VALUE: 2
PIF_VALUE: 24
PIF_VALUE: 33
PIF_VALUE: 27
PIF_VALUE: 1
PIF_VALUE: 31
PIF_VALUE: 31
PIF_VALUE: 20
PIF_VALUE: 32
PIF_VALUE: 17
PIF_VALUE: 32
PIF_VALUE: 33
PIF_VALUE: 31
PIF_VALUE: 22
PIF_VALUE: 34
PIF_VALUE: 22
PIF_VALUE: 33
PIF_VALUE: 32
PIF_VALUE: 16
PIF_VALUE: 22
PIF_VALUE: 20
PIF_VALUE: 21
PIF_VALUE: 34
PIF_VALUE: 31
PIF_VALUE: 31
PIF_VALUE: 34
PIF_VALUE: 22
PIF_VALUE: 31
PIF_VALUE: 33
PIF_VALUE: 33
PIF_VALUE: 21
PIF_VALUE: 31
PIF_VALUE: 34
PIF_VALUE: 33
PIF_VALUE: 22
PIF_VALUE: 22
PIF_VALUE: 33
PIF_VALUE: 31
PIF_VALUE: 22
PIF_VALUE: 33
PIF_VALUE: 1
PIF_VALUE: 22
PIF_VALUE: 33
PIF_VALUE: 2
PIF_VALUE: 24
PIF_VALUE: 22
PIF_VALUE: 22
PIF_VALUE: 24
PIF_VALUE: 24
PIF_VALUE: 34
PIF_VALUE: 33
PIF_VALUE: 21
PIF_VALUE: 35
PIF_VALUE: 31
PIF_VALUE: 34
PIF_VALUE: 34
PIF_VALUE: 32
PIF_VALUE: 34
PIF_VALUE: 22
PIF_VALUE: 24
PIF_VALUE: 21
PIF_VALUE: 33
PIF_VALUE: 22
PIF_VALUE: 21
PIF_VALUE: 22
PIF_VALUE: 33
PIF_VALUE: 22
PIF_VALUE: 31
PIF_VALUE: 22
PIF_VALUE: 22
PIF_VALUE: 34
PIF_VALUE: 33
PIF_VALUE: 27
PIF_VALUE: 34
PIF_VALUE: 31
PIF_VALUE: 0
PIF_VALUE: 34
PIF_VALUE: 33
PIF_VALUE: 22
PIF_VALUE: 19
PIF_VALUE: 22
PIF_VALUE: 33
PIF_VALUE: 22
PIF_VALUE: 1
PIF_VALUE: 32
PIF_VALUE: 33
PIF_VALUE: 31
PIF_VALUE: 18
PIF_VALUE: 22
PIF_VALUE: 34
PIF_VALUE: 31
PIF_VALUE: 34
PIF_VALUE: 25
PIF_VALUE: 32
PIF_VALUE: 24
PIF_VALUE: 33
PIF_VALUE: 2
PIF_VALUE: 32
PIF_VALUE: 33
PIF_VALUE: 31
PIF_VALUE: 34
PIF_VALUE: 22
PIF_VALUE: 33
PIF_VALUE: 24
PIF_VALUE: 33
PIF_VALUE: 21
PIF_VALUE: 33
PIF_VALUE: 33
PIF_VALUE: 35
PIF_VALUE: 22
PIF_VALUE: 31
PIF_VALUE: 33
PIF_VALUE: 1
PIF_VALUE: 22
PIF_VALUE: 18
PIF_VALUE: 1
PIF_VALUE: 2
PIF_VALUE: 22
PIF_VALUE: 24
PIF_VALUE: 24
PIF_VALUE: 20
PIF_VALUE: 22
PIF_VALUE: 31
PIF_VALUE: 33
PIF_VALUE: 33
PIF_VALUE: 21
PIF_VALUE: 21
PIF_VALUE: 22
PIF_VALUE: 33
PIF_VALUE: 33
PIF_VALUE: 31
PIF_VALUE: 24
PIF_VALUE: 30
PIF_VALUE: 33
PIF_VALUE: 23
PIF_VALUE: 22
PIF_VALUE: 33
PIF_VALUE: 22
PIF_VALUE: 20
PIF_VALUE: 34
PIF_VALUE: 33
PIF_VALUE: 33
PIF_VALUE: 7
PIF_VALUE: 9
PIF_VALUE: 22
PIF_VALUE: 31
PIF_VALUE: 33
PIF_VALUE: 22
PIF_VALUE: 22
PIF_VALUE: 33

## 2019-12-23 ASSESSMENT — PAIN SCALES - GENERAL: PAINLEVEL_OUTOF10: 0

## 2019-12-24 VITALS
TEMPERATURE: 98.8 F | SYSTOLIC BLOOD PRESSURE: 142 MMHG | HEIGHT: 71 IN | HEART RATE: 80 BPM | WEIGHT: 291.4 LBS | RESPIRATION RATE: 18 BRPM | DIASTOLIC BLOOD PRESSURE: 70 MMHG | BODY MASS INDEX: 40.8 KG/M2 | OXYGEN SATURATION: 94 %

## 2019-12-24 LAB
EKG ATRIAL RATE: 77 BPM
EKG P AXIS: 59 DEGREES
EKG P-R INTERVAL: 184 MS
EKG Q-T INTERVAL: 382 MS
EKG QRS DURATION: 86 MS
EKG QTC CALCULATION (BAZETT): 432 MS
EKG R AXIS: 60 DEGREES
EKG T AXIS: 118 DEGREES
EKG VENTRICULAR RATE: 77 BPM
GLUCOSE BLD-MCNC: 118 MG/DL (ref 70–108)

## 2019-12-24 PROCEDURE — 82948 REAGENT STRIP/BLOOD GLUCOSE: CPT

## 2019-12-24 PROCEDURE — 93005 ELECTROCARDIOGRAM TRACING: CPT | Performed by: INTERNAL MEDICINE

## 2019-12-24 PROCEDURE — 2709999900 HC NON-CHARGEABLE SUPPLY

## 2019-12-24 RX ADMIN — COLCHICINE 0.6 MG: 0.6 TABLET ORAL at 07:49

## 2019-12-24 RX ADMIN — METOPROLOL TARTRATE 100 MG: 100 TABLET ORAL at 07:49

## 2019-12-24 RX ADMIN — GLIMEPIRIDE 2 MG: 2 TABLET ORAL at 07:49

## 2019-12-24 RX ADMIN — FUROSEMIDE 40 MG: 40 TABLET ORAL at 07:49

## 2019-12-24 RX ADMIN — ALLOPURINOL 300 MG: 300 TABLET ORAL at 07:49

## 2019-12-24 RX ADMIN — AMIODARONE HYDROCHLORIDE 200 MG: 200 TABLET ORAL at 07:49

## 2019-12-24 RX ADMIN — Medication 250 MCG: at 07:49

## 2019-12-24 ASSESSMENT — PAIN SCALES - GENERAL: PAINLEVEL_OUTOF10: 0

## 2020-01-02 ENCOUNTER — TELEPHONE (OUTPATIENT)
Dept: CARDIOLOGY CLINIC | Age: 50
End: 2020-01-02

## 2020-01-02 NOTE — TELEPHONE ENCOUNTER
Pt called stating that he is currently in A-fib and heart reate is around 140/150.  [phone] please call

## 2020-01-02 NOTE — TELEPHONE ENCOUNTER
Patient called into the office -  [facility]     S/P afib ablation on 12.23.2019  Patient states that on 12.30.2019 he went back into Afib. Patient is wanting to know what the next step is.       Next f/u is scheduled for 2.4.2020 245pm

## 2020-01-27 ENCOUNTER — OFFICE VISIT (OUTPATIENT)
Dept: CARDIOLOGY | Age: 50
End: 2020-01-27
Payer: COMMERCIAL

## 2020-01-27 VITALS
HEIGHT: 71 IN | HEART RATE: 83 BPM | RESPIRATION RATE: 18 BRPM | DIASTOLIC BLOOD PRESSURE: 79 MMHG | WEIGHT: 297 LBS | BODY MASS INDEX: 41.58 KG/M2 | OXYGEN SATURATION: 95 % | SYSTOLIC BLOOD PRESSURE: 132 MMHG

## 2020-01-27 PROCEDURE — 99214 OFFICE O/P EST MOD 30 MIN: CPT | Performed by: INTERNAL MEDICINE

## 2020-01-27 PROCEDURE — 93000 ELECTROCARDIOGRAM COMPLETE: CPT | Performed by: INTERNAL MEDICINE

## 2020-01-27 RX ORDER — METOPROLOL TARTRATE 100 MG/1
100 TABLET ORAL 2 TIMES DAILY
Qty: 120 TABLET | Refills: 3 | Status: SHIPPED | OUTPATIENT
Start: 2020-01-27 | End: 2020-11-30

## 2020-01-27 RX ORDER — FUROSEMIDE 40 MG/1
40 TABLET ORAL 2 TIMES DAILY
Qty: 120 TABLET | Refills: 3 | Status: SHIPPED | OUTPATIENT
Start: 2020-01-27 | End: 2020-12-10 | Stop reason: SDUPTHER

## 2020-01-27 RX ORDER — AMIODARONE HYDROCHLORIDE 200 MG/1
200 TABLET ORAL DAILY
Qty: 60 TABLET | Refills: 3 | Status: SHIPPED | OUTPATIENT
Start: 2020-01-27 | End: 2020-07-28 | Stop reason: ALTCHOICE

## 2020-01-27 NOTE — PATIENT INSTRUCTIONS
SURVEY:    You may be receiving a survey from [employer] regarding your visit today. Please complete the survey to enable us to provide the highest quality of care to you and your family. If you cannot score us a very good on any question, please call the office to discuss how we could have made your experience a very good one. Thank you.

## 2020-01-27 NOTE — PROGRESS NOTES
[facility] am scribing for and in the presence of Tish Rao MD, F.A.C.C. Patient: Wolfgang Galaviz  : 1970  Date of Visit: 2020    REASON FOR VISIT / CONSULTATION: Follow-up (Hx: Afib, HTN, dyslipidemia, NICM, DIAZ, obesity. Pt is here today for 3 month f/u Pt is feeling good today and has no new problems. Denies: CP, SOB, dizziness/lightheaded, palpitations)      History of Present Illness:        Dear Geraldine Gibson MD    I had the pleasure of seeing Wolfgang Galaviz today. Mr. Annel Johnson is a 54 y.o. male with a history of congestive heart failure and atrial fibrillation. He was admitted to the hospital on 2019 for shortness of breath. He was than found to be in atrial fibrillation with rapid ventricular response. He was also found to have low ejection fraction leading to cardiac catheterization. Cardiac cath on 2019 showed no significant epicardial CAD confirming the diagnosis of tachycardia induced cardiomyopathy. Patient underwent cardioversion on 2019, only maintained sinus rhythm for few minutes. Subsequently loaded with amiodarone and discharged on Eliquis 5 mg twice daily in addition to metoprolol 50 mg twice daily. Cardioversion done again on 2019 successful cardioversion to NSR with  200J biphasic. However, within about 1 minute, the patient went back into  atrial fibrillation with RVR. Most recent ejection fraction by echo on 2019 was 40%. Mild LVH. No significant valvular abnormalities. He underwent pulmonary vein isolation by Dr. Na Barrett on 2019. He reported being out of rhythm few times after the ablation. He is currently on amiodarone in addition to Eliquis and he is maintaining sinus rhythm as. Today's ECG. Mr. Annel Johnson is here today for follow-up. He does have fatigue. He now uses a CPAP machine and cant get past an hour and a half at a time.   He is having hard time adjusting to CPAP therapy, he said he uses his CPAP machine only for 1-1 and half hour maximum. He wakes up every hour. He denied any chest pain, pressure or tightness. No significant shortness of breath. No orthopnea or PND. No palpitations or dizziness. His weight is a stable. He is very active with no significant exertional symptoms. No problems with current medications. He is scheduled to see Dr. Duane Coon next month. EKG done in office today 1/27/2020: Normal sinus rhythm. No acute ischemic changes. PAST MEDICAL HISTORY:        Past Medical History:   Diagnosis Date    History of cardiac cath 09/05/2019    [facility]) [place]/Dr. Kimbrough/Right Radial     History of cardioversion 09/19/2019    Dr Liliana Frankel Obesity, unspecified     Sleep apnea     Type II or unspecified type diabetes mellitus without mention of complication, not stated as uncontrolled     Unspecified essential hypertension        CURRENT ALLERGIES: Biaxin xl [clarithromycin] and Medrol [methylprednisolone] REVIEW OF SYSTEMS: 14 systems were reviewed. Pertinent positives and negatives as above, all else negative. Past Surgical History:   Procedure Laterality Date    ANTERIOR CRUCIATE LIGAMENT REPAIR  2009    CARDIOVERSION  09/06/2019    Dr Shaun Barker/200 joules then 250 joules- unsuccessful    TRANSESOPHAGEAL ECHOCARDIOGRAM  09/06/2019    Dr Rachel Whaley cardioversion    Social History:  Social History     Tobacco Use    Smoking status: Never Smoker    Smokeless tobacco: Current User     Types: Chew   Substance Use Topics    Alcohol use:  Yes     Alcohol/week: 10.0 standard drinks     Types: 10 Cans of beer per week    Drug use: Not Currently        CURRENT MEDICATIONS:        Outpatient Medications Marked as Taking for the 1/27/20 encounter (Office Visit) with Carlito Espinoza MD   Medication Sig Dispense Refill    amiodarone (CORDARONE) 200 MG tablet Take 1 tablet by mouth daily 60 tablet 3    apixaban (ELIQUIS) 5 MG bilaterally. Neurological: Alertness and orientation as per Constitutional exam. No evidence of gross cranial nerve deficit. Coordination appeared normal.   Skin: Skin is warm and dry. There is no rash or diaphoresis. Psychiatric: He has a normal mood and affect.  His speech is normal and behavior is normal.      MOST RECENT LABS ON RECORD:   Lab Results   Component Value Date    WBC 7.8 12/23/2019    HGB 15.0 12/23/2019    HCT 45.7 12/23/2019     12/23/2019    CHOL 96 09/05/2019    TRIG 75 09/05/2019    HDL 36 (L) 09/05/2019    ALT 47 (H) 09/04/2019    AST 40 (H) 09/04/2019     12/23/2019    K 4.3 12/23/2019    CL 98 12/23/2019    CREATININE 0.7 12/23/2019    BUN 16 12/23/2019    CO2 28 12/23/2019    TSH 4.43 09/04/2019    GLUF 208 (H) 05/22/2019    LABA1C 9.5 01/15/2020    LABMICR 860 (H) 05/22/2019       Last 3 CBC:  Lab Results   Component Value Date    WBC 7.8 12/23/2019    WBC 8.4 09/04/2019    WBC 10.4 04/10/2013    RBC 4.77 12/23/2019    RBC 4.31 09/04/2019    RBC 5.30 04/10/2013    HGB 15.0 12/23/2019    HGB 13.4 09/04/2019    HGB 16.5 04/10/2013    HCT 45.7 12/23/2019    HCT 43.8 09/04/2019    HCT 49.9 04/10/2013    MCV 95.8 12/23/2019    .6 09/04/2019    MCV 94.1 04/10/2013    MCH 31.4 12/23/2019    MCH 31.1 09/04/2019    MCH 31.0 04/10/2013    MCHC 32.8 12/23/2019    MCHC 30.6 09/04/2019    MCHC 33.0 04/10/2013    RDW 12.8 09/04/2019    RDW 13.4 04/10/2013     12/23/2019     09/04/2019     04/10/2013    MPV 11.6 12/23/2019    MPV 11.4 09/04/2019    MPV NOT REPORTED 04/10/2013       Last 3 BMP:  Lab Results   Component Value Date     12/23/2019     09/07/2019     09/06/2019    K 4.3 12/23/2019    K 4.0 09/07/2019    K 4.3 09/06/2019    CL 98 12/23/2019    CL 94 09/07/2019    CL 96 09/06/2019    CO2 28 12/23/2019    CO2 26 09/07/2019    CO2 31 09/06/2019    BUN 16 12/23/2019    BUN 12 09/07/2019    BUN 12 09/06/2019    CREATININE 0.7 12/23/2019 this.    Finally, I recommended that he continue his other medications and follow up with you as previously scheduled. FOLLOW UP:   I told Mr. Khloe Fonseca to call my office if he had any problems, but otherwise I asked him to Return in about 6 months (around 7/27/2020). However, I would be happy to see him sooner should the need arise. Sincerely,  Afshan Garcia MD, F.A.C.C. [facility] Cardiology Specialist     [facility], [address] [place] [address]  Phone: [phone], Fax: [phone]     I believe that the risk of significant morbidity and mortality related to the patient's current medical conditions are: intermediate-high. 25 minutes were spent with the patient and all of their questions were answered. The documentation recorded by the scribe, accurately and completely reflects the services I personally performed and the decisions made by me. Afshan Garcia MD, F.A.C.C.  January 27, 2020

## 2020-02-04 ENCOUNTER — OFFICE VISIT (OUTPATIENT)
Dept: CARDIOLOGY CLINIC | Age: 50
End: 2020-02-04
Payer: COMMERCIAL

## 2020-02-04 VITALS
BODY MASS INDEX: 41.72 KG/M2 | HEART RATE: 77 BPM | DIASTOLIC BLOOD PRESSURE: 83 MMHG | HEIGHT: 71 IN | WEIGHT: 298 LBS | SYSTOLIC BLOOD PRESSURE: 140 MMHG

## 2020-02-04 PROCEDURE — 99213 OFFICE O/P EST LOW 20 MIN: CPT | Performed by: INTERNAL MEDICINE

## 2020-02-04 PROCEDURE — 93000 ELECTROCARDIOGRAM COMPLETE: CPT | Performed by: INTERNAL MEDICINE

## 2020-02-04 ASSESSMENT — ENCOUNTER SYMPTOMS
SORE THROAT: 0
BLURRED VISION: 0
DOUBLE VISION: 0
NAUSEA: 0
VOMITING: 0
SHORTNESS OF BREATH: 0
CONSTIPATION: 0
COUGH: 0
ABDOMINAL PAIN: 0
RIGHT EYE: 0
DIARRHEA: 0
WHEEZING: 0
LEFT EYE: 0
BACK PAIN: 0

## 2020-02-04 NOTE — PROGRESS NOTES
CARDIOLOGY - ELECTROPHYSIOLOGY  PROGRESS NOTE    Date:   2/4/2020  Patient name: Zulema Madrid  Date of admission:   No admission date for patient encounter. MRN:   [id number]  YOB: 1970  PCP: Kiran Catalan MD    Subjective: MAMIE prater. Clinical Changes / Abnormalities:    HPI     11/13/2019:  The patient is a 53 y/o male that developed new onset heart failure with LV systolic function in September. The patient was admitted to [facility] on 09/04/2019. His ECG showed he was in atrial fibrillation with a rapid ventricular response. The patient did not have any palpitations. He was treated successfully for his heart failure. ECHO showed an EF of 40%. The patient underwent a cardiac catheterization that showed nonocclusive coronary artery disease. The patient had a [facility]GA guided cardioversion performed on 09/06/2019 but it was unsuccessful. He was started on amiodarone and underwent a second cardioversion on 09/19/2019 but it was also unsuccessful. The patient was treated with rate control and anticoagulation. He is no longer having any swelling and his heart failure symptoms are minor. He did undergo a sleep study on 10/31/2019 and was diagnosed with severe sleep apnea according to the patient. He is awaiting his CPAP trial on 12/13 or 14/ 2019. The patient's ventricular rate remains elevated despite the medications. He is being referred to discuss the options for further management. 02/04/2020: The patient is s/p PVI ablation on 12/23/2019. The patient called the office and reported he was back in atrial fibrillation on 12/30/2019. The patient was seen by Dr. Lucero Rinaldi on 1/27/2020. His ECG showed sinus rhythm. The patient is wearing his CPAP machine. He denies having karey recurrent episodes of atrial fibrillation since the undocumented episode on 12/30/2019.       Past Medical History:      Diagnosis Date    History of cardiac cath 09/05/2019    [facility]) [place]/ [place]/Right Radial     History of cardioversion 09/19/2019    Dr Liliana Frankel Obesity, unspecified     Sleep apnea     Type II or unspecified type diabetes mellitus without mention of complication, not stated as uncontrolled     Unspecified essential hypertension      Past Surgical History:      Procedure Laterality Date    ANTERIOR CRUCIATE LIGAMENT REPAIR  2009    CARDIOVERSION  09/06/2019    Dr Shaun Linares [place]/200 joules then 250 joules- unsuccessful    TRANSESOPHAGEAL ECHOCARDIOGRAM  09/06/2019    Dr Rachel Whaley cardioversion     Past Family History:       Problem Relation Age of Onset    Heart Attack Father     Cancer Father       Past Social History:     Social History     Socioeconomic History    Marital status:      Spouse name: None    Number of children: None    Years of education: None    Highest education level: None   Occupational History    None   Social Needs    Financial resource strain: None    Food insecurity:     Worry: None     Inability: None    Transportation needs:     Medical: None     Non-medical: None   Tobacco Use    Smoking status: Never Smoker    Smokeless tobacco: Current User     Types: Chew   Substance and Sexual Activity    Alcohol use:  Yes     Alcohol/week: 10.0 standard drinks     Types: 10 Cans of beer per week    Drug use: Not Currently    Sexual activity: None   Lifestyle    Physical activity:     Days per week: None     Minutes per session: None    Stress: None   Relationships    Social connections:     Talks on phone: None     Gets together: None     Attends Restorationist service: None     Active member of club or organization: None     Attends meetings of clubs or organizations: None     Relationship status: None    Intimate partner violence:     Fear of current or ex partner: None     Emotionally abused: None     Physically abused: None     Forced sexual activity: None   Other Topics Concern    None Social History Narrative    None       Medications:   Scheduled Meds:  Continuous Infusions:   CBC: No results for input(s): WBC, HGB, PLT in the last 72 hours. BMP:  No results for input(s): NA, K, CL, CO2, BUN, CREATININE, GLUCOSE in the last 72 hours. Hepatic: No results for input(s): AST, ALT, ALB, BILITOT, ALKPHOS in the last 72 hours. Troponin: No results for input(s): TROPONINI in the last 72 hours. BNP: No results for input(s): BNP in the last 72 hours. Lipids: No results for input(s): CHOL, HDL in the last 72 hours. Invalid input(s): LDLCALCU  INR: No results for input(s): INR in the last 72 hours. Objective:   REVIEW OF SYSTEMS:    Review of Systems   Constitution: Negative for fever, weight gain and weight loss. HENT: Negative for hearing loss and sore throat. Eyes: Negative for blurred vision, double vision, vision loss in left eye and vision loss in right eye. Cardiovascular: Negative for chest pain, dyspnea on exertion, irregular heartbeat, near-syncope, palpitations and syncope. Respiratory: Negative for cough, shortness of breath and wheezing. Endocrine: Negative for cold intolerance, heat intolerance and polyuria. Hematologic/Lymphatic: Negative for bleeding problem. Does not bruise/bleed easily. Skin: Negative for dry skin, itching and rash. Musculoskeletal: Negative for arthritis, back pain, joint pain and myalgias. Gastrointestinal: Negative for abdominal pain, constipation, diarrhea, nausea and vomiting. Genitourinary: Negative for dysuria, frequency, hematuria and urgency. Neurological: Negative for dizziness, headaches, light-headedness, numbness, paresthesias, seizures and vertigo. Psychiatric/Behavioral: Negative for depression and memory loss. The patient is not nervous/anxious. PHYSICAL EXAM:  BP (!) 140/83   Pulse 77   Ht 5' 11\" (1.803 m)   Wt 298 lb (135.2 kg)   BMI 41.56 kg/m²     Physical Exam  Vitals signs and nursing note reviewed. Normal.      Right lower leg: Normal.      Left lower leg: Normal.   Lymphadenopathy:      Head:      Right side of head: No submandibular adenopathy. Left side of head: No submandibular adenopathy. Skin:     General: Skin is warm and dry. Findings: No lesion or rash. Nails: There is no clubbing. Neurological:      Mental Status: He is alert and oriented to person, place, and time. Cranial Nerves: No cranial nerve deficit. Sensory: No sensory deficit. Coordination: Coordination normal.      Gait: Gait normal.      Deep Tendon Reflexes: Reflexes normal.   Psychiatric:         Mood and Affect: Mood is not anxious or depressed. Affect is not angry. Speech: Speech normal.         Behavior: Behavior normal.         Thought Content: Thought content normal.         Judgment: Judgment normal.         DIAGNOSTIC STUDIES & LABORATORY DATA:     I have personally reviewed andinterpreted the results of the following diagnostic testing  CARDIAC HISTORY:     CATH: 09/05/2019  Angiographic Findings      Cardiac Arteries and Lesion Findings     LMCA: Normal 0% stenosis.     LAD: Mild irregularities 20-30%.     LCx: Mild irregularities 10-20%.     RCA: Normal 0% stenosis.      Coronary Tree      Dominance: Right     LV function assessed as:Abnormal.  Ejection Fraction  +-------------------------------------------+------------------------------+  ! Method                                     !EF%                           !  +-------------------------------------------+------------------------------+  ! Echocardiography                           !40                            !  +-------------------------------------------+------------------------------+     Ventriculography Findings:  Mildly elevated left ventricular end diastolic pressure (LVEDP) with no  significant aortic valve gradient seen on pull-back across the aortic valve.     ECHO:  09/04/2019 [facility]  FINDINGS  Left Atrium  Left atrial having a percutaneous ablation performed after his CPAP trial is completed. I recommended admission to the hospital to start Sotalol AF and then performing the ablation after his 5th dose. Patient underwent PVI ablation on 12/23/2019. He was not started on Sotalol AF since he was still on amiodarone. The patient reports having an episode of atrial fibrillation on 12/30/2019 but there is no recording of the episode. He has not had any reported or documented episodes since then. He is still on the amiodarone. He is using his CPAP most of the time. I discussed with the patient the short term management and long term management plan. I recommend continuing amiodarone for the next two months. I will then discuss the option of stopping amiodarone and not replacing it with any antiarrhythmia medication or to start Sotalol AF once the amiodarone has cleared his system. I will also discuss the management of his anticoagulation at the next visit. Patient Active Problem List:     Type 2 diabetes mellitus without complication ([facility])     Essential hypertension     Obesity, Class III, BMI 40-49.9 (morbid obesity) ([facility])     Low testosterone     Acute on chronic combined systolic and diastolic CHF, NYHA class 3 ([facility])     Cardiomyopathy ([facility])     Atrial fibrillation with RVR ([facility])     Dyslipidemia     Obstructive sleep apnea     Persistent atrial fibrillation with rapid ventricular response     A-fib ([facility])     Atrial fibrillation ([address]      Plan of Treatment:   1. Continue current medications for the present time. 2. F/U with me in two months. 3. I encouraged 100% compliance with CPAP.

## 2020-05-11 ENCOUNTER — TELEPHONE (OUTPATIENT)
Dept: CARDIOLOGY CLINIC | Age: 50
End: 2020-05-11

## 2020-07-28 ENCOUNTER — OFFICE VISIT (OUTPATIENT)
Dept: CARDIOLOGY | Age: 50
End: 2020-07-28
Payer: COMMERCIAL

## 2020-07-28 VITALS
HEART RATE: 86 BPM | RESPIRATION RATE: 18 BRPM | WEIGHT: 295 LBS | BODY MASS INDEX: 41.3 KG/M2 | DIASTOLIC BLOOD PRESSURE: 80 MMHG | HEIGHT: 71 IN | OXYGEN SATURATION: 97 % | SYSTOLIC BLOOD PRESSURE: 138 MMHG

## 2020-07-28 PROCEDURE — 99214 OFFICE O/P EST MOD 30 MIN: CPT | Performed by: INTERNAL MEDICINE

## 2020-07-28 PROCEDURE — 93000 ELECTROCARDIOGRAM COMPLETE: CPT | Performed by: INTERNAL MEDICINE

## 2020-07-28 RX ORDER — ASPIRIN 325 MG
325 TABLET, DELAYED RELEASE (ENTERIC COATED) ORAL DAILY
Qty: 30 TABLET | Refills: 3 | COMMUNITY
Start: 2020-07-28

## 2020-07-28 NOTE — PROGRESS NOTES
Jennifer Ventura am scribing for and in the presence of Mohit Parmar MD, F.A.C.C. Patient: Marissa Perkins  : 1970  Date of Visit: 2020    REASON FOR VISIT / CONSULTATION: Follow-up (Hx: PAF, NICM, CHF, HLD, DIAZ, chr anticoag. Pt is doing well. Denies: CP, SOB, dizziness, lightheaded, palpitations.)      History of Present Illness:        Dear Juan Jose Barker MD    I had the pleasure of seeing Marissa Perkins today. Mr. Mor Tirado is a 52 y.o. male with a history of congestive heart failure and atrial fibrillation. He was admitted to the hospital on 2019 for shortness of breath. He was than found to be in atrial fibrillation with rapid ventricular response. He was also found to have low ejection fraction leading to cardiac catheterization. Cardiac cath on 2019 showed no significant epicardial CAD confirming the diagnosis of tachycardia induced cardiomyopathy. Patient underwent cardioversion on 2019, only maintained sinus rhythm for few minutes. Subsequently loaded with amiodarone and discharged on Eliquis 5 mg twice daily in addition to metoprolol 50 mg twice daily. Cardioversion done again on 2019 successful cardioversion to NSR with  200J biphasic. However, within about 1 minute, the patient went back into  atrial fibrillation with RVR. Most recent ejection fraction by echo on 2019 was 40%. Mild LVH. No significant valvular abnormalities. He underwent pulmonary vein isolation by  [facility] on 2019. He reported being out of rhythm few times after the ablation. He has been treated with amiodarone since ablation procedure. He told me that he was only out of rhythm for few days after the ablation. He now knows when he is in A. fib and reported that he is maintaining his sinus rhythm for the past 6 months or so. EKG done in office 2020: Normal sinus rhythm. No acute ischemic changes.     ECG done in office today 20: normal sinus with Eddi Suazo MD   Medication Sig Dispense Refill    albuterol sulfate HFA (VENTOLIN HFA) 108 (90 Base) MCG/ACT inhaler Inhale 2 puffs into the lungs 4 times daily as needed for Wheezing 1 Inhaler 0    amiodarone (CORDARONE) 200 MG tablet Take 1 tablet by mouth daily 60 tablet 3    apixaban (ELIQUIS) 5 MG TABS tablet Take 1 tablet by mouth 2 times daily 120 tablet 3    furosemide (LASIX) 40 MG tablet Take 1 tablet by mouth 2 times daily 120 tablet 3    metoprolol (LOPRESSOR) 100 MG tablet Take 1 tablet by mouth 2 times daily 120 tablet 3    TRULICITY 1.85 YF/4.9FL SOPN  INJECT 0.75 MILLIGRAM INTO SKIN EVERY 7 DAYS 2 mL 5    metFORMIN (GLUCOPHAGE) 1000 MG tablet TAKE 1 TABLET TWICE A DAY WITH MEALS 180 tablet 3    glimepiride (AMARYL) 2 MG tablet TAKE 1 TABLET TWICE A  tablet 3    atorvastatin (LIPITOR) 40 MG tablet TAKE 1 TABLET DAILY 90 tablet 3    lisinopril (PRINIVIL;ZESTRIL) 20 MG tablet TAKE 1 TABLET DAILY 90 tablet 3    etodolac (LODINE) 400 MG tablet Take 400 mg by mouth 2 times daily       colchicine (COLCRYS) 0.6 MG tablet Take 0.6 mg by mouth daily.  allopurinol (ZYLOPRIM) 300 MG tablet Take 300 mg by mouth daily.  valACYclovir (VALTREX) 1 G tablet Take 1 tablet by mouth 3 times daily. 90 tablet 11       FAMILY HISTORY: family history includes Cancer in his father; Heart Attack in his father. Physical Examination:     /80 (Site: Left Upper Arm, Position: Sitting, Cuff Size: Large Adult)   Pulse 86   Resp 18   Ht 5' 10.98\" (1.803 m)   Wt 295 lb (133.8 kg)   SpO2 97%   BMI 41.16 kg/m²  Body mass index is 41.16 kg/m². Constitutional: He appeared oriented to person and place. He appears well-developed and well-nourished. In no acute distress. HEENT: Normocephalic and atraumatic. No JVD present. Carotid bruit is not present. No mass and no thyromegaly present. No lymphadenopathy noted.   Cardiovascular: Normal rate, regularly irregular rhythm, normal heart sounds. Exam reveals no gallop and no friction rubs. No murmur was heard. Pulmonary/Chest: Effort normal and breath sounds normal. No respiratory distress. He has no wheezes, rhonchi or rales. Abdominal: Soft, non-tender. He exhibits no organomegaly, mass or bruit. Extremities: No significant edema. No cyanosis or clubbing. 2+ radial and carotid pulses. Distal extremity pulses: 2+ bilaterally. A small 2 x 2 centimeter ulcer on the lateral side of the left leg with surrounding erythema. Granulation tissue seen at the base of the ulcer. Neurological: Alertness and orientation as per Constitutional exam. No evidence of gross cranial nerve deficit. Coordination appeared normal.   Skin: Skin is warm and dry. There is no rash or diaphoresis. Psychiatric: He has a normal mood and affect.  His speech is normal and behavior is normal.      MOST RECENT LABS ON RECORD:   Lab Results   Component Value Date    WBC 7.8 12/23/2019    HGB 15.0 12/23/2019    HCT 45.7 12/23/2019     12/23/2019    CHOL 96 09/05/2019    TRIG 75 09/05/2019    HDL 36 (L) 09/05/2019    ALT 47 (H) 09/04/2019    AST 40 (H) 09/04/2019     12/23/2019    K 4.3 12/23/2019    CL 98 12/23/2019    CREATININE 0.7 12/23/2019    BUN 16 12/23/2019    CO2 28 12/23/2019    TSH 4.43 09/04/2019    GLUF 208 (H) 05/22/2019    LABA1C 9.5 01/15/2020    LABMICR 860 (H) 05/22/2019       Last 3 CBC:  Lab Results   Component Value Date    WBC 7.8 12/23/2019    WBC 8.4 09/04/2019    WBC 10.4 04/10/2013    RBC 4.77 12/23/2019    RBC 4.31 09/04/2019    RBC 5.30 04/10/2013    HGB 15.0 12/23/2019    HGB 13.4 09/04/2019    HGB 16.5 04/10/2013    HCT 45.7 12/23/2019    HCT 43.8 09/04/2019    HCT 49.9 04/10/2013    MCV 95.8 12/23/2019    .6 09/04/2019    MCV 94.1 04/10/2013    MCH 31.4 12/23/2019    MCH 31.1 09/04/2019    MCH 31.0 04/10/2013    MCHC 32.8 12/23/2019    MCHC 30.6 09/04/2019    MCHC 33.0 04/10/2013    RDW 12.8 09/04/2019    RDW 13.4 04/10/2013  12/23/2019     09/04/2019     04/10/2013    MPV 11.6 12/23/2019    MPV 11.4 09/04/2019    MPV NOT REPORTED 04/10/2013       Last 3 BMP:  Lab Results   Component Value Date     12/23/2019     09/07/2019     09/06/2019    K 4.3 12/23/2019    K 4.0 09/07/2019    K 4.3 09/06/2019    CL 98 12/23/2019    CL 94 09/07/2019    CL 96 09/06/2019    CO2 28 12/23/2019    CO2 26 09/07/2019    CO2 31 09/06/2019    BUN 16 12/23/2019    BUN 12 09/07/2019    BUN 12 09/06/2019    CREATININE 0.7 12/23/2019    CREATININE 0.73 09/07/2019    CREATININE 0.69 09/06/2019    CALCIUM 9.5 12/23/2019    CALCIUM 9.4 09/07/2019    CALCIUM 9.8 09/06/2019       Last 3 LIPID:  Lab Results   Component Value Date    CHOL 96 09/05/2019    CHOL 244 03/23/2016    CHOL 182 12/21/2013    HDL 36 09/05/2019    HDL 48 05/22/2019    HDL 55 03/23/2016    CHOLHDLRATIO 2.7 09/05/2019    CHOLHDLRATIO 2.8 05/22/2019    CHOLHDLRATIO 4.4 03/23/2016    TRIG 75 09/05/2019    TRIG 252 03/23/2016    TRIG 136 12/21/2013    VLDL NOT REPORTED 09/05/2019    VLDL NOT REPORTED 05/22/2019    VLDL NOT REPORTED 03/23/2016       Last 3 TROPONIN:  Lab Results   Component Value Date    TROPONINT <0.03 09/05/2019    TROPONINT <0.03 09/04/2019       ASSESSMENT:     1. PAF (paroxysmal atrial fibrillation) ([facility]    2. S/P ablation of atrial fibrillation    3. Chronic combined systolic and diastolic congestive heart failure ([facility]    4. NICM (nonischemic cardiomyopathy) ([facility]    5. Mixed hyperlipidemia    6. DIAZ (obstructive sleep apnea)    7. Severe obesity (BMI >= 40) ([facility])         PLAN:        Paroxysmal Atrial Fibrillation: S/P cardioversion x2, S/P ablation on 12/23/2019. He was back in atrial fibrillation on 12/30/2019 but resumed sinus rhythm when I saw him on 1/27/2020. He tells me that he has been in rhythm since that time. He now knows when he is out of rhythm.   His blood pressure machine is telling him if his heart rate is regular or not.  We discussed his amiodarone therapy and Eliquis. Since patient is maintaining sinus rhythm for the past 6 months, I told him it is safe to discontinue amiodarone. I don't want to keep him on amiodarone for the rest of his life. He is young and he will definitely develop side effects from amiodarone as a long-term therapy. I did explain to him that there is a tiny chance that he can go back in atrial fibrillation and the options we have will be either admitting him for sotalol loading or redo an ablation procedure. We also discussed his anticoagulation therapy. Since patient is status post ablation and the ablation procedure is successful so far. I feel comfortable discontinuing his Eliquis and starting him on aspirin 325 mg daily. Antiplatelet Agent: START Aspirin 325 mg daily. I also reminded them to watch for signs of bloody or black tarry stools and stop the medication immediately if this develops as this could be life threatening. Beta Blocker: Continue metoprolol tartrate (Lopressor) 100 mg twice daily. Since patient is now aware when he is in atrial fibrillation, I asked him to call me immediately if he has recurrence of his atrial fibrillation so we can discuss further options.  Chronic combined heart failure: Nonischemic cardiomyopathy   Beta Blocker: Continue Metoprolol tartrate (Lopressor) 100 mg bid.  ACE Inibitor/ARB: Continue lisinopril 20 mg daily.  Diuretics: Continue furosemide (Lasix) 40 mg 2 times daily.  Heart failure counseling: I advised them to try and keep their legs up whenever possible and to limit salt in their diet.  His most recent ejection fraction was 40% with no significant valvular abnormalities.  No significant weight gain or worsening shortness of breath.  I will get a repeat echo for reevaluation of his ejection fraction.     · Hyperlipidemia: Mixed LDL 45 mg/dL on 9/5/2019  · Cholesterol Reduction Therapy: Continue Atorvastatin (Lipitor) 40

## 2020-07-28 NOTE — PATIENT INSTRUCTIONS
SURVEY:    You may be receiving a survey from [employer] regarding your visit today. Please complete the survey to enable us to provide the highest quality of care to you and your family. If you cannot score us a very good on any question, please call the office to discuss how we could have made your experience a very good one. Thank you.

## 2020-07-30 ENCOUNTER — TELEPHONE (OUTPATIENT)
Dept: CARDIOLOGY | Age: 50
End: 2020-07-30

## 2020-07-30 NOTE — TELEPHONE ENCOUNTER
Informed Mr. Savannah Nagelpawancynthia that Dr. Lupis Brambila put an Echo order in for him and that scheduling would be calling him to schedule that. Pt verbalized understanding. Thanks!

## 2020-08-14 ENCOUNTER — HOSPITAL ENCOUNTER (OUTPATIENT)
Dept: NON INVASIVE DIAGNOSTICS | Age: 50
Discharge: HOME | End: 2020-08-14
Payer: COMMERCIAL

## 2020-08-14 LAB
LV EF: 55 %
LVEF MODALITY: NORMAL

## 2020-08-14 PROCEDURE — C8929 TTE W OR WO FOL WCON,DOPPLER: HCPCS

## 2020-08-14 PROCEDURE — 6360000004 HC RX CONTRAST MEDICATION: Performed by: INTERNAL MEDICINE

## 2020-08-14 RX ADMIN — PERFLUTREN 1.65 MG: 6.52 INJECTION, SUSPENSION INTRAVENOUS at 15:30

## 2020-11-03 PROBLEM — I48.91 A-FIB (HCC): Status: RESOLVED | Noted: 2019-12-20 | Resolved: 2020-11-03

## 2020-11-03 PROBLEM — I48.91 ATRIAL FIBRILLATION (HCC): Status: RESOLVED | Noted: 2019-12-23 | Resolved: 2020-11-03

## 2020-11-30 RX ORDER — METOPROLOL TARTRATE 100 MG/1
TABLET ORAL
Qty: 120 TABLET | Refills: 3 | Status: SHIPPED | OUTPATIENT
Start: 2020-11-30 | End: 2021-11-12

## 2020-12-01 ENCOUNTER — OFFICE VISIT (OUTPATIENT)
Dept: CARDIOLOGY | Age: 50
End: 2020-12-01
Payer: COMMERCIAL

## 2020-12-01 VITALS
RESPIRATION RATE: 18 BRPM | HEIGHT: 71 IN | DIASTOLIC BLOOD PRESSURE: 79 MMHG | WEIGHT: 293.6 LBS | BODY MASS INDEX: 41.1 KG/M2 | SYSTOLIC BLOOD PRESSURE: 128 MMHG | HEART RATE: 96 BPM | OXYGEN SATURATION: 98 %

## 2020-12-01 PROCEDURE — 99214 OFFICE O/P EST MOD 30 MIN: CPT | Performed by: INTERNAL MEDICINE

## 2020-12-01 NOTE — PROGRESS NOTES
Matteo Fernandez am scribing for and in the presence of Silva Ceballos MD, F.A.C.C. Patient: Yolanda Arriola  : 1970  Date of Visit: 2020    REASON FOR VISIT / CONSULTATION: 3 Month Follow-Up (Hx:PAF,CHF,NICM,HLD,DIAZ. Echo on . He has been doing good. Denies: CP, SOB, Lightheaded/dizziness, Palpitations. )      History of Present Illness:        Dear KILLIAN Varela - CNP    I had the pleasure of seeing Yolanda Arriola today. Mr. Lacey Carreno is a 52 y.o. male with a history of congestive heart failure and atrial fibrillation. He was admitted to the hospital on 2019 for shortness of breath. He was than found to be in atrial fibrillation with rapid ventricular response. He was also found to have low ejection fraction leading to cardiac catheterization. Cardiac cath on 2019 showed no significant epicardial CAD confirming the diagnosis of tachycardia induced cardiomyopathy. Patient underwent cardioversion on 2019, only maintained sinus rhythm for few minutes. Subsequently loaded with amiodarone and discharged on Eliquis 5 mg twice daily in addition to metoprolol 50 mg twice daily. Cardioversion done again on 2019 successful cardioversion to NSR with  200J biphasic. However, within about 1 minute, the patient went back into  atrial fibrillation with RVR. Most recent ejection fraction by echo on 2019 was 40%. Mild LVH. No significant valvular abnormalities. He underwent pulmonary vein isolation by Dr. Adeola Wilson on 2019. He reported being out of rhythm few times after the ablation. He has been treated with amiodarone since ablation procedure. He told me that he was only out of rhythm for few days after the ablation. He now knows when he is in A. fib and reported that he is maintaining his sinus rhythm for the past 6 months or so. EKG done in office 2020: Normal sinus rhythm. No acute ischemic changes.     ECG done in office 20: normal sinus rhythm. Echocardiogram done on 8/14/2020 showed an EF of 55% Mild LV hypertrophy. Mild diastolic dysfunction is seen. Mr. Misael Alvarez is here today for a 3 month follow up. He is doing well cardiac wise. He doesn't complain of any new issues at this time. His sleep apnea is about the same, he reports he is getting closer to use the CPAP. He denied any chest pain, pressure or tightness. No significant shortness of breath. No orthopnea or PND. No lightheaded/dizziness or palpitations. His weight is a stable. He is very active with no significant exertional symptoms. No abdominal pain, bleeding issues, bowel issues, problems with current medications or any other issues at this time. He recently got . He does have 4 kids he takes care of them. Ages are 25, 12, 15 and 15    PAST MEDICAL HISTORY:        Past Medical History:   Diagnosis Date    History of cardiac cath 09/05/2019    [facility]) [place]/Dr. Kimbrough/Right Radial     History of cardioversion 09/19/2019    Dr Jose Manuel Morgan Obesity, unspecified     Sleep apnea     Type II or unspecified type diabetes mellitus without mention of complication, not stated as uncontrolled     Unspecified essential hypertension        CURRENT ALLERGIES: Biaxin xl [clarithromycin] and Medrol [methylprednisolone] REVIEW OF SYSTEMS: 14 systems were reviewed. Pertinent positives and negatives as above, all else negative. Past Surgical History:   Procedure Laterality Date    ANTERIOR CRUCIATE LIGAMENT REPAIR  2009    CARDIOVERSION  09/06/2019    Dr Fabiola Barker/200 joules then 250 joules- unsuccessful    TRANSESOPHAGEAL ECHOCARDIOGRAM  09/06/2019    Dr Lety Cardona cardioversion    Social History:  Social History     Tobacco Use    Smoking status: Never Smoker    Smokeless tobacco: Current User     Types: Chew   Substance Use Topics    Alcohol use:  Yes     Alcohol/week: 10.0 standard drinks Types: 10 Cans of beer per week    Drug use: Not Currently        CURRENT MEDICATIONS:        Outpatient Medications Marked as Taking for the 12/1/20 encounter (Office Visit) with Cayla Stone MD   Medication Sig Dispense Refill    atorvastatin (LIPITOR) 40 MG tablet TAKE 1 TABLET DAILY 90 tablet 0    glimepiride (AMARYL) 2 MG tablet TAKE 1 TABLET TWICE A  tablet 0    metoprolol (LOPRESSOR) 100 MG tablet TAKE 1 TABLET TWICE A  tablet 3    aspirin 325 MG EC tablet Take 1 tablet by mouth daily 30 tablet 3    Dulaglutide (TRULICITY) 7.05 GL/7.2EM SOPN Inject 0.75 mg into the skin once a week 2 mL 5    metFORMIN (GLUCOPHAGE) 1000 MG tablet TAKE 1 TABLET TWICE A DAY WITH MEALS 180 tablet 3    lisinopril (PRINIVIL;ZESTRIL) 20 MG tablet TAKE 1 TABLET DAILY 90 tablet 3    furosemide (LASIX) 40 MG tablet Take 1 tablet by mouth 2 times daily 120 tablet 3    etodolac (LODINE) 400 MG tablet Take 400 mg by mouth 2 times daily       colchicine (COLCRYS) 0.6 MG tablet Take 0.6 mg by mouth every other day       allopurinol (ZYLOPRIM) 300 MG tablet Take 300 mg by mouth daily. FAMILY HISTORY: family history includes Cancer in his father; Heart Attack in his father. Physical Examination:     /79 (Site: Left Upper Arm, Position: Sitting, Cuff Size: Large Adult)   Pulse 96   Resp 18   Ht 5' 11\" (1.803 m)   Wt 293 lb 9.6 oz (133.2 kg)   SpO2 98%   BMI 40.95 kg/m²  Body mass index is 40.95 kg/m². Constitutional: He appeared oriented to person and place. He appears well-developed and well-nourished. In no acute distress. HEENT: Normocephalic and atraumatic. No JVD present. Carotid bruit is not present. No mass and no thyromegaly present. No lymphadenopathy noted. Cardiovascular: Normal rate, regular rhythm, normal heart sounds. Exam reveals no gallop and no friction rubs. No murmur was heard. Pulmonary/Chest: Effort normal and breath sounds normal. No respiratory distress.  He has no wheezes, rhonchi or rales. Abdominal: Soft, non-tender. He exhibits no organomegaly, mass or bruit. Extremities: No edema no cyanosis or clubbing. 2+ radial and carotid pulses. Distal extremity pulses: 2+ bilaterally. Neurological: Alertness and orientation as per Constitutional exam. No evidence of gross cranial nerve deficit. Coordination appeared normal.   Skin: Skin is warm and dry. There is no rash or diaphoresis. Psychiatric: He has a normal mood and affect.  His speech is normal and behavior is normal.        MOST RECENT LABS ON RECORD:   Lab Results   Component Value Date    WBC 7.8 12/23/2019    HGB 15.0 12/23/2019    HCT 45.7 12/23/2019     12/23/2019    CHOL 96 09/05/2019    TRIG 75 09/05/2019    HDL 36 (L) 09/05/2019    ALT 47 (H) 09/04/2019    AST 40 (H) 09/04/2019     12/23/2019    K 4.3 12/23/2019    CL 98 12/23/2019    CREATININE 0.7 12/23/2019    BUN 16 12/23/2019    CO2 28 12/23/2019    TSH 4.43 09/04/2019    GLUF 208 (H) 05/22/2019    LABA1C 7.4 07/28/2020    LABMICR 860 (H) 05/22/2019       Last 3 CBC:  Lab Results   Component Value Date    WBC 7.8 12/23/2019    WBC 8.4 09/04/2019    WBC 10.4 04/10/2013    RBC 4.77 12/23/2019    RBC 4.31 09/04/2019    RBC 5.30 04/10/2013    HGB 15.0 12/23/2019    HGB 13.4 09/04/2019    HGB 16.5 04/10/2013    HCT 45.7 12/23/2019    HCT 43.8 09/04/2019    HCT 49.9 04/10/2013    MCV 95.8 12/23/2019    .6 09/04/2019    MCV 94.1 04/10/2013    MCH 31.4 12/23/2019    MCH 31.1 09/04/2019    MCH 31.0 04/10/2013    MCHC 32.8 12/23/2019    MCHC 30.6 09/04/2019    MCHC 33.0 04/10/2013    RDW 12.8 09/04/2019    RDW 13.4 04/10/2013     12/23/2019     09/04/2019     04/10/2013    MPV 11.6 12/23/2019    MPV 11.4 09/04/2019    MPV NOT REPORTED 04/10/2013       Last 3 BMP:  Lab Results   Component Value Date     12/23/2019     09/07/2019     09/06/2019    K 4.3 12/23/2019    K 4.0 09/07/2019    K 4.3 09/06/2019 CL 98 12/23/2019    CL 94 09/07/2019    CL 96 09/06/2019    CO2 28 12/23/2019    CO2 26 09/07/2019    CO2 31 09/06/2019    BUN 16 12/23/2019    BUN 12 09/07/2019    BUN 12 09/06/2019    CREATININE 0.7 12/23/2019    CREATININE 0.73 09/07/2019    CREATININE 0.69 09/06/2019    CALCIUM 9.5 12/23/2019    CALCIUM 9.4 09/07/2019    CALCIUM 9.8 09/06/2019       Last 3 LIPID:  Lab Results   Component Value Date    CHOL 96 09/05/2019    CHOL 244 03/23/2016    CHOL 182 12/21/2013    HDL 36 09/05/2019    HDL 48 05/22/2019    HDL 55 03/23/2016    CHOLHDLRATIO 2.7 09/05/2019    CHOLHDLRATIO 2.8 05/22/2019    CHOLHDLRATIO 4.4 03/23/2016    TRIG 75 09/05/2019    TRIG 252 03/23/2016    TRIG 136 12/21/2013    VLDL NOT REPORTED 09/05/2019    VLDL NOT REPORTED 05/22/2019    VLDL NOT REPORTED 03/23/2016       Last 3 TROPONIN:  Lab Results   Component Value Date    TROPONINT <0.03 09/05/2019    TROPONINT <0.03 09/04/2019       ASSESSMENT:     1. PAF [facility]oxysmal [facility] fibrillation) ([facility]    2. Chronic combined systolic and diastolic congestive heart failure ([facility]    3. Mixed hyperlipidemia    4. DIAZ on CPAP    5. Morbid obesity ([facility]         PLAN:        History of atrial fibrillation S/P cardioversion x2, S/P ablation on 12/23/2019. Currently maintaining sinus rhythm on clinical examination. Antiplatelet Agent: Continue Aspirin 325 mg daily. Beta Blocker: Continue metoprolol tartrate (Lopressor) 100 mg twice daily. Reviewed his most recent echo. Ejection fraction is now normal.     Chronic combined heart failure: Nonischemic Cardiomyopathy: Tachycardia induced cardiomyopathy, ejection fraction improved to 55% according to his last echo on 8/14/2020.  Beta Blocker: Continue Metoprolol tartrate (Lopressor) 100 mg bid.  ACE Inibitor/ARB: Continue lisinopril 20 mg daily.  Diuretics: Continue furosemide (Lasix) 40 mg 2 times daily.    Heart failure counseling: I advised them to try and keep their legs up whenever possible and to limit salt in their diet.  Is a scheduled to have blood work done by KILLIAN Chun CNP will follow up   Currently no signs of volume overload. · Hyperlipidemia: Mixed- Last LDL on 9/5/2019 was 45 mg/dL   · Cholesterol Reduction Therapy: Continue Atorvastatin (Lipitor) 40 mg daily. I discussed the potential benefits of statin therapy as well as the potential risks including myalgia as well as the rare but potentially serious complication of liver or kidney damage. Although rare, I told them that this could be serious and therefore told them to stop the medication immediately and call if they developed any severe muscle aches or pains and they agreed to do so. · Obstructive Sleep A pnea: AHI of 14  · He is struggling to continue using CPAP, however he is going to continue to keep trying. Morbid obesity: Body mass index is 40.95 kg/m². I also briefly discussed both diet and exercise strategies for him to continue to loses weight and he was very receptive to this. Finally, I recommended that he continue his other medications and follow up with you as previously scheduled. FOLLOW UP:   I told Mr. Jason Schumacher to call my office if he had any problems, but otherwise I asked him to Return in about 6 months (around 6/1/2021). However, I would be happy to see him sooner should the need arise. Sincerely,  Swathi White MD, F.A.C.C. [facility]iology Specialist    [address]  Phone: [phone], Fax: [phone]     I believe that the risk of significant morbidity and mortality related to the patient's current medical conditions are: intermediate-high. 25 minutes were spent with the patient and all of their questions were answered. The documentation recorded by the scribe, accurately and completely reflects the services I personally performed and the decisions made by me. Swathi White MD, F.A.C.C.  December 1, 2020

## 2020-12-01 NOTE — PATIENT INSTRUCTIONS
SURVEY:    You may be receiving a survey from [employer] regarding your visit today. Please complete the survey to enable us to provide the highest quality of care to you and your family. If you cannot score us a very good on any question, please call the office to discuss how we could have made your experience a very good one. Thank you.

## 2020-12-10 RX ORDER — FUROSEMIDE 40 MG/1
40 TABLET ORAL 2 TIMES DAILY
Qty: 60 TABLET | Refills: 0 | Status: SHIPPED | OUTPATIENT
Start: 2020-12-10 | End: 2021-01-04

## 2021-04-29 DIAGNOSIS — I50.42 CHRONIC COMBINED SYSTOLIC AND DIASTOLIC CONGESTIVE HEART FAILURE (HCC): Primary | ICD-10-CM

## 2021-04-29 RX ORDER — FUROSEMIDE 40 MG/1
TABLET ORAL
Qty: 180 TABLET | Refills: 3 | Status: SHIPPED | OUTPATIENT
Start: 2021-04-29 | End: 2022-09-14 | Stop reason: SDUPTHER

## 2021-04-29 RX ORDER — FUROSEMIDE 40 MG/1
TABLET ORAL
Qty: 60 TABLET | Refills: 0 | Status: SHIPPED | OUTPATIENT
Start: 2021-04-29 | End: 2021-04-29 | Stop reason: SDUPTHER

## 2021-04-29 NOTE — TELEPHONE ENCOUNTER
Patient needs a 30 days supply sent locally until his mail order Rx comes in.  I also sent refills to mail in

## 2021-07-08 ENCOUNTER — OFFICE VISIT (OUTPATIENT)
Dept: CARDIOLOGY | Age: 51
End: 2021-07-08
Payer: COMMERCIAL

## 2021-07-08 VITALS
SYSTOLIC BLOOD PRESSURE: 139 MMHG | WEIGHT: 298 LBS | DIASTOLIC BLOOD PRESSURE: 80 MMHG | BODY MASS INDEX: 41.72 KG/M2 | HEART RATE: 98 BPM | OXYGEN SATURATION: 95 % | RESPIRATION RATE: 18 BRPM | HEIGHT: 71 IN

## 2021-07-08 DIAGNOSIS — I50.42 CHRONIC COMBINED SYSTOLIC AND DIASTOLIC CONGESTIVE HEART FAILURE (HCC): ICD-10-CM

## 2021-07-08 DIAGNOSIS — E66.01 MORBID OBESITY (HCC): ICD-10-CM

## 2021-07-08 DIAGNOSIS — I48.0 PAROXYSMAL ATRIAL FIBRILLATION (HCC): Primary | ICD-10-CM

## 2021-07-08 DIAGNOSIS — E78.2 MIXED HYPERLIPIDEMIA: ICD-10-CM

## 2021-07-08 DIAGNOSIS — I10 ESSENTIAL HYPERTENSION: ICD-10-CM

## 2021-07-08 PROCEDURE — 99214 OFFICE O/P EST MOD 30 MIN: CPT | Performed by: INTERNAL MEDICINE

## 2021-07-08 PROCEDURE — 93000 ELECTROCARDIOGRAM COMPLETE: CPT | Performed by: INTERNAL MEDICINE

## 2021-07-08 NOTE — PATIENT INSTRUCTIONS
SURVEY:    You may be receiving a survey from [employer] website regarding your visit today. Please complete the survey to enable us to provide the highest quality of care to you and your family. If you cannot score us a very good on any question, please call the office to discuss how we could have made your experience a very good one. Thank you. Darby Garner was your MA today!

## 2021-07-08 NOTE — PROGRESS NOTES
Luz Neville am scribing for and in the presence of Swathi White MD, F.A.C.C. Patient: Navya Domínguez  : 1970  Date of Visit: 2021    REASON FOR VISIT / CONSULTATION: 6 Month Follow-Up (HX: PAF, CHF, DIAZ, Morbid obesity. Pt states he is doing well. Denies: CP, Palpitations, Ligheaded/dizzienss, SOB.)      History of Present Illness:        Dear Maximus Biswas, APRN - CNP    I had the pleasure of seeing Navya Domínguez today. Mr. Jason Schumacher is a 54 y.o. male with a history of congestive heart failure and atrial fibrillation. He was admitted to the hospital on 2019 for shortness of breath. He was than found to be in atrial fibrillation with rapid ventricular response. He was also found to have low ejection fraction leading to cardiac catheterization. Cardiac cath on 2019 showed no significant epicardial CAD confirming the diagnosis of tachycardia induced cardiomyopathy. Patient underwent cardioversion on 2019, only maintained sinus rhythm for few minutes. Subsequently loaded with amiodarone and discharged on Eliquis 5 mg twice daily in addition to metoprolol 50 mg twice daily. Cardioversion done again on 2019 successful cardioversion to NSR with  200J biphasic. However, within about 1 minute, the patient went back into  atrial fibrillation with RVR. Most recent ejection fraction by echo on 2019 was 40%. Mild LVH. No significant valvular abnormalities. He underwent pulmonary vein isolation by Dr. Dawkins Going on 2019. He reported being out of rhythm few times after the ablation. He has been treated with amiodarone since ablation procedure. He told me that he was only out of rhythm for few days after the ablation. He now knows when he is in A. fib and reported that he is maintaining his sinus rhythm for the past 6 months or so. EKG done in office 20: Normal sinus rhythm.     Echocardiogram done on 2020 showed an EF of 55% Mild LV hypertrophy. Mild diastolic dysfunction is seen. Mr. Erick Jessica is here today for follow up. He is doing well cardiac wise. He denied any chest pain, pressure or tightness. He is officially  now and is taking care of his four kids with no issues at all. He is trying to use his CPAP machine regularly. He is working a lot lately. No significant shortness of breath. No passing out or near passing out episodes. No orthopnea or PND. No lightheaded/dizziness or palpitations. His weight is a stable. He is active with no significant exertional symptoms. No nausea or vomiting. No abdominal pain, bleeding issues, bowel issues, problems with current medications or any other issues at this time. He is drinking a few beers here and there and is limiting his caffeine intake. He does think he stays well hydrated. EKG done in office today (7/8/2021): Normal sinus rhythm with a HR of 95 bpm    PAST MEDICAL HISTORY:        Past Medical History:   Diagnosis Date    History of cardiac cath 09/05/2019    [facility] [place]/Dr. Kimbrough/Right Radial     History of cardioversion 09/19/2019    Dr Kelly Loss Obesity, unspecified     Sleep apnea     Type II or unspecified type diabetes mellitus without mention of complication, not stated as uncontrolled     Unspecified essential hypertension        CURRENT ALLERGIES: Biaxin xl [clarithromycin] and Medrol [methylprednisolone] REVIEW OF SYSTEMS: 14 systems were reviewed. Pertinent positives and negatives as above, all else negative.      Past Surgical History:   Procedure Laterality Date    ANTERIOR CRUCIATE LIGAMENT REPAIR  2009    CARDIOVERSION  09/06/2019    Dr Fermin Barker/200 joules then 250 joules- unsuccessful    TRANSESOPHAGEAL ECHOCARDIOGRAM  09/06/2019    Dr Marcel[facility] cardioversion    Social History:  Social History     Tobacco Use    Smoking status: Never Smoker    Smokeless tobacco: Current User     Types: Chew Vaping Use    Vaping Use: Never used   Substance Use Topics    Alcohol use: Yes     Alcohol/week: 10.0 standard drinks     Types: 10 Cans of beer per week    Drug use: Not Currently        CURRENT MEDICATIONS:        Outpatient Medications Marked as Taking for the 7/8/21 encounter (Office Visit) with Fadi Quinn MD   Medication Sig Dispense Refill    lisinopril (PRINIVIL;ZESTRIL) 20 MG tablet TAKE 1 TABLET DAILY 90 tablet 1    colchicine (COLCRYS) 0.6 MG tablet Take 1 tablet by mouth every other day 45 tablet 1    furosemide (LASIX) 40 MG tablet take 1 tablet by mouth twice a day 180 tablet 3    Dulaglutide (TRULICITY) 1.5 KK/5.4OR SOPN Inject 1.5 mg into the skin once a week 4 pen 2    SITagliptin (JANUVIA) 100 MG tablet Take 1 tablet by mouth daily 90 tablet 1    meloxicam (MOBIC) 15 MG tablet Take 1 tablet by mouth daily 90 tablet 0    metFORMIN (GLUCOPHAGE) 1000 MG tablet Take 1 tablet by mouth 2 times daily (with meals) 180 tablet 0    atorvastatin (LIPITOR) 40 MG tablet TAKE 1 TABLET DAILY 90 tablet 0    glimepiride (AMARYL) 2 MG tablet TAKE 1 TABLET TWICE A  tablet 0    metoprolol (LOPRESSOR) 100 MG tablet TAKE 1 TABLET TWICE A  tablet 3    aspirin 325 MG EC tablet Take 1 tablet by mouth daily 30 tablet 3    allopurinol (ZYLOPRIM) 300 MG tablet Take 300 mg by mouth daily. FAMILY HISTORY: family history includes Cancer in his father; Heart Attack in his father. Physical Examination:     BP (!) 156/87 (Site: Left Upper Arm, Position: Sitting, Cuff Size: Large Adult)   Pulse 98   Resp 18   Ht 5' 11\" (1.803 m)   Wt 298 lb (135.2 kg)   SpO2 95%   BMI 41.56 kg/m²  Body mass index is 41.56 kg/m². Constitutional: He appeared oriented to person and place. He appears well-developed and well-nourished. In no acute distress. HEENT: Normocephalic and atraumatic. No JVD present. Carotid bruit is not present. No mass and no thyromegaly present.  No lymphadenopathy noted. Cardiovascular: Normal rate, regular rhythm, normal heart sounds. Exam reveals no gallop and no friction rubs. No murmur was heard. Pulmonary/Chest: Effort normal and breath sounds normal. No respiratory distress. He has no wheezes, rhonchi or rales. Abdominal: Soft, non-tender. He exhibits no organomegaly, mass or bruit. Extremities: No edema no cyanosis or clubbing. 2+ radial and carotid pulses. Distal extremity pulses: 2+ bilaterally. Neurological: Alertness and orientation as per Constitutional exam. No evidence of gross cranial nerve deficit. Coordination appeared normal.   Skin: Skin is warm and dry. There is no rash or diaphoresis. Psychiatric: He has a normal mood and affect.  His speech is normal and behavior is normal.        MOST RECENT LABS ON RECORD:   Lab Results   Component Value Date    WBC 7.8 12/23/2019    HGB 15.0 12/23/2019    HCT 45.7 12/23/2019     12/23/2019    CHOL 96 09/05/2019    TRIG 75 09/05/2019    HDL 36 (L) 09/05/2019    ALT 47 (H) 09/04/2019    AST 40 (H) 09/04/2019     12/23/2019    K 4.3 12/23/2019    CL 98 12/23/2019    CREATININE 0.7 12/23/2019    BUN 16 12/23/2019    CO2 28 12/23/2019    TSH 4.43 09/04/2019    GLUF 208 (H) 05/22/2019    LABA1C 9.1 04/22/2021    LABMICR 860 (H) 05/22/2019       Last 3 CBC:  Lab Results   Component Value Date    WBC 7.8 12/23/2019    WBC 8.4 09/04/2019    WBC 10.4 04/10/2013    RBC 4.77 12/23/2019    RBC 4.31 09/04/2019    RBC 5.30 04/10/2013    HGB 15.0 12/23/2019    HGB 13.4 09/04/2019    HGB 16.5 04/10/2013    HCT 45.7 12/23/2019    HCT 43.8 09/04/2019    HCT 49.9 04/10/2013    MCV 95.8 12/23/2019    .6 09/04/2019    MCV 94.1 04/10/2013    MCH 31.4 12/23/2019    MCH 31.1 09/04/2019    MCH 31.0 04/10/2013    MCHC 32.8 12/23/2019    MCHC 30.6 09/04/2019    MCHC 33.0 04/10/2013    RDW 12.8 09/04/2019    RDW 13.4 04/10/2013     12/23/2019     09/04/2019     04/10/2013    MPV 11.6 12/23/2019 MPV 11.4 09/04/2019    MPV NOT REPORTED 04/10/2013       Last 3 BMP:  Lab Results   Component Value Date     12/23/2019     09/07/2019     09/06/2019    K 4.3 12/23/2019    K 4.0 09/07/2019    K 4.3 09/06/2019    CL 98 12/23/2019    CL 94 09/07/2019    CL 96 09/06/2019    CO2 28 12/23/2019    CO2 26 09/07/2019    CO2 31 09/06/2019    BUN 16 12/23/2019    BUN 12 09/07/2019    BUN 12 09/06/2019    CREATININE 0.7 12/23/2019    CREATININE 0.73 09/07/2019    CREATININE 0.69 09/06/2019    CALCIUM 9.5 12/23/2019    CALCIUM 9.4 09/07/2019    CALCIUM 9.8 09/06/2019       Last 3 LIPID:  Lab Results   Component Value Date    CHOL 96 09/05/2019    CHOL 244 03/23/2016    CHOL 182 12/21/2013    HDL 36 09/05/2019    HDL 48 05/22/2019    HDL 55 03/23/2016    CHOLHDLRATIO 2.7 09/05/2019    CHOLHDLRATIO 2.8 05/22/2019    CHOLHDLRATIO 4.4 03/23/2016    TRIG 75 09/05/2019    TRIG 252 03/23/2016    TRIG 136 12/21/2013    VLDL NOT REPORTED 09/05/2019    VLDL NOT REPORTED 05/22/2019    VLDL NOT REPORTED 03/23/2016       Last 3 TROPONIN:  Lab Results   Component Value Date    TROPONINT <0.03 09/05/2019    TROPONINT <0.03 09/04/2019       ASSESSMENT:     1. Paroxysmal atrial fibrillation (HCC)    2. Chronic combined systolic and diastolic congestive heart failure ([facility] [address]    3. Essential hypertension    4. Mixed hyperlipidemia    5. Morbid obesity ([facility] [address]       PLAN:        History of atrial fibrillation S/P cardioversion x2, S/P ablation on 12/23/2019. Currently maintaining sinus rhythm on clinical examination. Antiplatelet Agent: Continue Aspirin 325 mg daily. Beta Blocker: Continue metoprolol tartrate (Lopressor) 100 mg twice daily. He is trying to still get used to using his CPAP machine nightly. He is very active physically with no significant exertional symptoms. Still trying to lose weight.      Chronic combined heart failure: Nonischemic Cardiomyopathy: Tachycardia induced cardiomyopathy, ejection fraction improved to 55% according to his last echo on 8/14/2020. Currently no signs of volume overload.  Beta Blocker: Continue Metoprolol tartrate (Lopressor) 100 mg bid.  ACE Inibitor/ARB: Continue lisinopril 20 mg daily.  Diuretics: Continue furosemide (Lasix) 40 mg 2 times daily.  Heart failure counseling: I advised them to try and keep their legs up whenever possible and to limit salt in their diet. · Essential Hypertension: Controlled   Beta Blocker: Continue Metoprolol tartrate (Lopressor) 100 mg bid.  ACE Inibitor/ARB: Continue lisinopril 20 mg daily.  Diuretics: Continue furosemide (Lasix) 40 mg 2 times daily. · Hyperlipidemia: Mixed- Last LDL on 9/5/2019 was 45 mg/dL   · Cholesterol Reduction Therapy: Continue Atorvastatin (Lipitor) 40 mg daily. · Laboratory Testing: Ordered a Lipid Panel to be done to reassess his LDL at this time. Morbid obesity: Body mass index is 41.56 kg/m². I also briefly discussed both diet and exercise strategies for him to continue to loses weight and he was very receptive to this. Finally, I recommended that he continue his other medications and follow up with you as previously scheduled. FOLLOW UP:   I told Mr. Anneliese Galvan to call my office if he had any problems, but otherwise I asked him to Return in about 1 year (around 7/8/2022). However, I would be happy to see him sooner should the need arise. Sincerely,  Yvan Wolff MD, F.A.C.C. [facility] Cardiology Specialist    [address] [place] [address]  Phone: [phone], Fax: [phone]     I believe that the risk of significant morbidity and mortality related to the patient's current medical conditions are: Intermediate. The documentation recorded by the scribe, accurately and completely reflects the services I personally performed and the decisions made by me. Yvan Wolff MD, F.A.C.C.  July 8, 2021

## 2021-08-10 ENCOUNTER — HOSPITAL ENCOUNTER (OUTPATIENT)
Age: 51
Discharge: HOME | End: 2021-08-10
Payer: COMMERCIAL

## 2021-08-10 DIAGNOSIS — E11.9 TYPE 2 DIABETES MELLITUS WITHOUT COMPLICATION, WITHOUT LONG-TERM CURRENT USE OF INSULIN (HCC): ICD-10-CM

## 2021-08-10 DIAGNOSIS — E66.01 MORBID OBESITY (HCC): ICD-10-CM

## 2021-08-10 DIAGNOSIS — I50.42 CHRONIC COMBINED SYSTOLIC AND DIASTOLIC CONGESTIVE HEART FAILURE (HCC): ICD-10-CM

## 2021-08-10 DIAGNOSIS — I10 ESSENTIAL HYPERTENSION: ICD-10-CM

## 2021-08-10 DIAGNOSIS — E78.2 MIXED HYPERLIPIDEMIA: ICD-10-CM

## 2021-08-10 DIAGNOSIS — I48.0 PAROXYSMAL ATRIAL FIBRILLATION (HCC): ICD-10-CM

## 2021-08-10 LAB
ALBUMIN SERPL-MCNC: 4.4 G/DL (ref 3.5–5.2)
ALBUMIN/GLOBULIN RATIO: 1.5 (ref 1–2.5)
ALP BLD-CCNC: 81 U/L (ref 40–129)
ALT SERPL-CCNC: 47 U/L (ref 5–41)
ANION GAP SERPL CALCULATED.3IONS-SCNC: 14 MMOL/L (ref 9–17)
AST SERPL-CCNC: 36 U/L
BILIRUB SERPL-MCNC: 0.67 MG/DL (ref 0.3–1.2)
BUN BLDV-MCNC: 14 MG/DL (ref 6–20)
BUN/CREAT BLD: 21 (ref 9–20)
CALCIUM SERPL-MCNC: 9.2 MG/DL (ref 8.6–10.4)
CHLORIDE BLD-SCNC: 94 MMOL/L (ref 98–107)
CHOLESTEROL/HDL RATIO: 2.4
CHOLESTEROL: 141 MG/DL
CO2: 26 MMOL/L (ref 20–31)
CREAT SERPL-MCNC: 0.66 MG/DL (ref 0.7–1.2)
CREATININE URINE: 201.3 MG/DL (ref 39–259)
GFR AFRICAN AMERICAN: >60 ML/MIN
GFR NON-AFRICAN AMERICAN: >60 ML/MIN
GFR SERPL CREATININE-BSD FRML MDRD: ABNORMAL ML/MIN/{1.73_M2}
GFR SERPL CREATININE-BSD FRML MDRD: ABNORMAL ML/MIN/{1.73_M2}
GLUCOSE BLD-MCNC: 121 MG/DL (ref 70–99)
HCT VFR BLD CALC: 44 % (ref 40.7–50.3)
HDLC SERPL-MCNC: 59 MG/DL
HEMOGLOBIN: 14.9 G/DL (ref 13–17)
LDL CHOLESTEROL: 59 MG/DL (ref 0–130)
MCH RBC QN AUTO: 32.3 PG (ref 25.2–33.5)
MCHC RBC AUTO-ENTMCNC: 33.9 G/DL (ref 28.4–34.8)
MCV RBC AUTO: 95.4 FL (ref 82.6–102.9)
MICROALBUMIN/CREAT 24H UR: 1217 MG/L
MICROALBUMIN/CREAT UR-RTO: 605 MCG/MG CREAT
NRBC AUTOMATED: 0 PER 100 WBC
PDW BLD-RTO: 12.6 % (ref 11.8–14.4)
PLATELET # BLD: 192 K/UL (ref 138–453)
PMV BLD AUTO: 10.8 FL (ref 8.1–13.5)
POTASSIUM SERPL-SCNC: 4.2 MMOL/L (ref 3.7–5.3)
RBC # BLD: 4.61 M/UL (ref 4.21–5.77)
SODIUM BLD-SCNC: 134 MMOL/L (ref 135–144)
TOTAL PROTEIN: 7.4 G/DL (ref 6.4–8.3)
TRIGL SERPL-MCNC: 113 MG/DL
VLDLC SERPL CALC-MCNC: NORMAL MG/DL (ref 1–30)
WBC # BLD: 7.9 K/UL (ref 3.5–11.3)

## 2021-08-10 PROCEDURE — 85027 COMPLETE CBC AUTOMATED: CPT

## 2021-08-10 PROCEDURE — 80053 COMPREHEN METABOLIC PANEL: CPT

## 2021-08-10 PROCEDURE — 82570 ASSAY OF URINE CREATININE: CPT

## 2021-08-10 PROCEDURE — 80061 LIPID PANEL: CPT

## 2021-08-10 PROCEDURE — 36415 COLL VENOUS BLD VENIPUNCTURE: CPT

## 2021-08-10 PROCEDURE — 83036 HEMOGLOBIN GLYCOSYLATED A1C: CPT

## 2021-08-10 PROCEDURE — 82043 UR ALBUMIN QUANTITATIVE: CPT

## 2021-08-11 LAB
ESTIMATED AVERAGE GLUCOSE: 192 MG/DL
HBA1C MFR BLD: 8.3 % (ref 4–6)

## 2021-08-13 ENCOUNTER — TELEPHONE (OUTPATIENT)
Dept: CARDIOLOGY | Age: 51
End: 2021-08-13

## 2021-08-13 NOTE — TELEPHONE ENCOUNTER
----- Message from Robby Clayton MD sent at 8/12/2021  9:46 PM EDT -----  Lipid panel is good. Continue current therapy and follow-up. Please call with questions and/or concerns.   Thank you

## 2021-08-13 NOTE — TELEPHONE ENCOUNTER
Spoke with Sheela Carrera and let him know his lipid panel was good and to continue his current medications. He verbalized understanding.

## 2022-04-05 PROBLEM — E11.3293 MILD NONPROLIFERATIVE DIABETIC RETINOPATHY OF BOTH EYES WITHOUT MACULAR EDEMA ASSOCIATED WITH TYPE 2 DIABETES MELLITUS (HCC): Status: ACTIVE | Noted: 2022-04-05

## 2022-07-21 ENCOUNTER — OFFICE VISIT (OUTPATIENT)
Dept: CARDIOLOGY | Age: 52
End: 2022-07-21
Payer: COMMERCIAL

## 2022-07-21 VITALS
RESPIRATION RATE: 18 BRPM | HEIGHT: 71 IN | WEIGHT: 295.8 LBS | SYSTOLIC BLOOD PRESSURE: 117 MMHG | BODY MASS INDEX: 41.41 KG/M2 | DIASTOLIC BLOOD PRESSURE: 76 MMHG | OXYGEN SATURATION: 94 % | HEART RATE: 85 BPM

## 2022-07-21 DIAGNOSIS — Z98.890 S/P ABLATION OF ATRIAL FIBRILLATION: ICD-10-CM

## 2022-07-21 DIAGNOSIS — I48.0 PAROXYSMAL ATRIAL FIBRILLATION (HCC): Primary | ICD-10-CM

## 2022-07-21 DIAGNOSIS — E66.01 MORBID OBESITY (HCC): ICD-10-CM

## 2022-07-21 DIAGNOSIS — G47.33 OSA ON CPAP: ICD-10-CM

## 2022-07-21 DIAGNOSIS — Z86.79 S/P ABLATION OF ATRIAL FIBRILLATION: ICD-10-CM

## 2022-07-21 DIAGNOSIS — Z99.89 OSA ON CPAP: ICD-10-CM

## 2022-07-21 DIAGNOSIS — I50.42 CHRONIC COMBINED SYSTOLIC AND DIASTOLIC CONGESTIVE HEART FAILURE (HCC): ICD-10-CM

## 2022-07-21 DIAGNOSIS — I10 ESSENTIAL HYPERTENSION: ICD-10-CM

## 2022-07-21 PROCEDURE — 93000 ELECTROCARDIOGRAM COMPLETE: CPT | Performed by: INTERNAL MEDICINE

## 2022-07-21 PROCEDURE — 99214 OFFICE O/P EST MOD 30 MIN: CPT | Performed by: INTERNAL MEDICINE

## 2022-07-21 NOTE — PATIENT INSTRUCTIONS
SURVEY:    You may be receiving a survey from [employer] regarding your visit today. Please complete the survey to enable us to provide the highest quality of care to you and your family. If you cannot score us a very good on any question, please call the office to discuss how we could have made your experience a very good one. Thank you.

## 2022-07-21 NOTE — PROGRESS NOTES
Chriss Muir am scribing for and in the presence of Philip Denver, MD, F.A.C.C. Patient: Eula Fairbanks  : 1970  Date of Visit: 2022    REASON FOR VISIT / CONSULTATION: Follow-up (Hx: hx of afib, CHF, HTN, HLD, obesity. Pt is here for 1 year f/u. Thinks he went into afib a few days ago. Normal dizziness Denies: CP, SOB)      History of Present Illness:        Dear Jarrett Hutchison, APRN - CNP    I had the pleasure of seeing Eula Fairbanks today. Mr. Paty Cuellar is a 50 y.o. male with a history of congestive heart failure and atrial fibrillation. He was admitted to the hospital on 2019 for shortness of breath. He was than found to be in atrial fibrillation with rapid ventricular response. He was also found to have low ejection fraction leading to cardiac catheterization. Cardiac cath on 2019 showed no significant epicardial CAD confirming the diagnosis of tachycardia induced cardiomyopathy. Patient underwent cardioversion on 2019, only maintained sinus rhythm for few minutes. Subsequently loaded with amiodarone and discharged on Eliquis 5 mg twice daily in addition to metoprolol 50 mg twice daily. Cardioversion done again on 2019 successful cardioversion to NSR with  200J biphasic. However, within about 1 minute, the patient went back into  atrial fibrillation with RVR. Most recent ejection fraction by echo on 2019 was 40%. Mild LVH. No significant valvular abnormalities. He underwent pulmonary vein isolation by Dr. London Gee on 2019. He reported being out of rhythm few times after the ablation. He has been treated with amiodarone since ablation procedure. He told me that he was only out of rhythm for few days after the ablation. He now knows when he is in A. fib and reported that he is maintaining his sinus rhythm for the past 6 months or so. EKG done in office 20: Normal sinus rhythm.     Echocardiogram done on 2020 showed an EF of 55% Mild LV hypertrophy. Mild diastolic dysfunction is seen. EKG done in office (7/8/2021): Normal sinus rhythm with a HR of 95 bpm    Mr. Selma Dill is here today for follow up. He has been doing well since his last visit. He has had two episodes of atrial fibrillation since his last visit. His last episode was a few days ago and lasted about 4 hours. He states he was watching tv when he noticed it. His heart rate was around 122 bpm when it happened. He did not feel any shortness of breath when it happened. He usually sits and tries to relax until it goes away. He does continue to work daily and he is physically active at work. He does not use his CPAP machine nightly. His glucose is under control. He has had more fatigue than usual.     He denied any chest pain, pressure or tightness. No significant shortness of breath. No passing out or near passing out episodes. No orthopnea or PND. No lightheaded/dizziness or palpitations. His weight is a stable. No nausea or vomiting. No abdominal pain, bleeding issues, bowel issues, problems with current medications or any other issues at this time. He drinks about two beers nightly to help him sleep. EKG done in office today 7/21/2022- normal sinus rhythm, no acute ischemic changes    PAST MEDICAL HISTORY:        Past Medical History:   Diagnosis Date    History of cardiac cath 09/05/2019    [facility] [place]/Dr. 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 Fahad Merlos, T    Sleep apnea     non compliant cpap    Type II or unspecified type diabetes mellitus without mention of complication, not stated as uncontrolled     Unspecified essential hypertension        CURRENT ALLERGIES: Biaxin xl [clarithromycin] and Medrol [methylprednisolone] REVIEW OF SYSTEMS: 14 systems were reviewed. Pertinent positives and negatives as above, all else negative. Past Surgical History:   Procedure Laterality Date    ANTERIOR CRUCIATE LIGAMENT REPAIR  2009    CARDIOVERSION  09/06/2019    Dr Vazquez Haw Dearing/200 joules then 250 joules- unsuccessful    TRANSESOPHAGEAL ECHOCARDIOGRAM  09/06/2019    Dr Batsheva Mclaughlin cardioversion    Social History:  Social History     Tobacco Use    Smoking status: Never    Smokeless tobacco: Current     Types: Chew   Vaping Use    Vaping Use: Never used   Substance Use Topics    Alcohol use:  Yes     Alcohol/week: 10.0 standard drinks     Types: 10 Cans of beer per week    Drug use: Not Currently        CURRENT MEDICATIONS:        Outpatient Medications Marked as Taking for the 7/21/22 encounter (Office Visit) with Kurtis Coats MD   Medication Sig Dispense Refill    lisinopril (PRINIVIL;ZESTRIL) 20 MG tablet TAKE 1 TABLET DAILY 90 tablet 1    Dulaglutide (TRULICITY) 1.5 RA/8.6MJ SOPN Inject 1.5 mg into the skin once a week 12 pen 1    empagliflozin (JARDIANCE) 10 MG tablet Take 1 tablet by mouth daily 90 tablet 1    valACYclovir (VALTREX) 1 g tablet Take 1 tablet by mouth 3 times daily 90 tablet 0    allopurinol (ZYLOPRIM) 300 MG tablet Take 1 tablet by mouth daily 90 tablet 1    celecoxib (CELEBREX) 100 MG capsule Take 1 capsule by mouth 2 times daily 60 capsule 0    glimepiride (AMARYL) 2 MG tablet TAKE 1 TABLET TWICE A  tablet 1    colchicine (COLCRYS) 0.6 MG tablet Take 1 tablet by mouth every other day 45 tablet 1    atorvastatin (LIPITOR) 40 MG tablet TAKE 1 TABLET DAILY 90 tablet 1    metFORMIN (GLUCOPHAGE) 1000 MG tablet Take 1 tablet by mouth 2 times daily (with meals) 180 tablet 1    metoprolol (LOPRESSOR) 100 MG tablet TAKE 1 TABLET TWICE A  tablet 3    furosemide (LASIX) 40 MG tablet take 1 tablet by mouth twice a day 180 tablet 3    aspirin 325 MG EC tablet Take 1 tablet by mouth daily 30 tablet 3       FAMILY HISTORY: family history includes Cancer in his father; Heart Attack in his father. Physical Examination:     /76 (Site: Left Upper Arm, Position: Sitting, Cuff Size: Large Adult)   Pulse 85   Resp 18   Ht 5' 10.98\" (1.803 m)   Wt 295 lb 12.8 oz (134.2 kg)   SpO2 94%   BMI 41.27 kg/m²  Body mass index is 41.27 kg/m². Constitutional: He appeared oriented to person and place. He appears well-developed and well-nourished. In no acute distress. HEENT: Normocephalic and atraumatic. No JVD present. Carotid bruit is not present. No mass and no thyromegaly present. No lymphadenopathy noted. Cardiovascular: Normal rate, regular rhythm, normal heart sounds. Exam reveals no gallop and no friction rubs. No murmur was heard. Pulmonary/Chest: Effort normal and breath sounds normal. No respiratory distress. He has no wheezes, rhonchi or rales. Abdominal: Soft, non-tender. He exhibits no organomegaly, mass or bruit. Extremities: No edema no cyanosis or clubbing. 2+ radial and carotid pulses. Distal extremity pulses: 2+ bilaterally. Neurological: Alertness and orientation as per Constitutional exam. No evidence of gross cranial nerve deficit. Coordination appeared normal.   Skin: Skin is warm and dry. There is no rash or diaphoresis. Psychiatric: He has a normal mood and affect.  His speech is normal and behavior is normal.        MOST RECENT LABS ON RECORD:   Lab Results   Component Value Date    WBC 7.9 08/10/2021    HGB 14.9 08/10/2021    HCT 44.0 08/10/2021     08/10/2021    CHOL 141 08/10/2021    TRIG 113 08/10/2021    HDL 59 08/10/2021    ALT 50 (H) 04/06/2022    AST 37 04/06/2022     04/06/2022    K 4.7 04/06/2022    CL 99 04/06/2022    CREATININE 0.84 04/06/2022    BUN 21 04/06/2022    CO2 27 04/06/2022    TSH 4.43 09/04/2019    GLUF 208 (H) 05/22/2019    LABA1C 7.7 (H) 04/06/2022    LABMICR 605 (H) 08/10/2021       Last 3 CBC:  Lab Results   Component Value Date/Time    WBC 7.9 08/10/2021 04:48 PM WBC 7.8 12/23/2019 06:50 AM    WBC 8.4 09/04/2019 03:43 PM    RBC 4.61 08/10/2021 04:48 PM    RBC 4.77 12/23/2019 06:50 AM    RBC 4.31 09/04/2019 03:43 PM    HGB 14.9 08/10/2021 04:48 PM    HGB 15.0 12/23/2019 06:50 AM    HGB 13.4 09/04/2019 03:43 PM    HCT 44.0 08/10/2021 04:48 PM    HCT 45.7 12/23/2019 06:50 AM    HCT 43.8 09/04/2019 03:43 PM    MCV 95.4 08/10/2021 04:48 PM    MCV 95.8 12/23/2019 06:50 AM    .6 09/04/2019 03:43 PM    MCH 32.3 08/10/2021 04:48 PM    MCH 31.4 12/23/2019 06:50 AM    MCH 31.1 09/04/2019 03:43 PM    MCHC 33.9 08/10/2021 04:48 PM    MCHC 32.8 12/23/2019 06:50 AM    MCHC 30.6 09/04/2019 03:43 PM    RDW 12.6 08/10/2021 04:48 PM    RDW 12.8 09/04/2019 03:43 PM    RDW 13.4 04/10/2013 04:01 PM     08/10/2021 04:48 PM     12/23/2019 06:50 AM     09/04/2019 03:43 PM    MPV 10.8 08/10/2021 04:48 PM    MPV 11.6 12/23/2019 06:50 AM    MPV 11.4 09/04/2019 03:43 PM       Last 3 BMP:  Lab Results   Component Value Date/Time     04/06/2022 03:13 PM     08/10/2021 04:48 PM     12/23/2019 06:50 AM    K 4.7 04/06/2022 03:13 PM    K 4.2 08/10/2021 04:48 PM    K 4.3 12/23/2019 06:50 AM    CL 99 04/06/2022 03:13 PM    CL 94 08/10/2021 04:48 PM    CL 98 12/23/2019 06:50 AM    CO2 27 04/06/2022 03:13 PM    CO2 26 08/10/2021 04:48 PM    CO2 28 12/23/2019 06:50 AM    BUN 21 04/06/2022 03:13 PM    BUN 14 08/10/2021 04:48 PM    BUN 16 12/23/2019 06:50 AM    CREATININE 0.84 04/06/2022 03:13 PM    CREATININE 0.66 08/10/2021 04:48 PM    CREATININE 0.7 12/23/2019 06:50 AM    CALCIUM 9.8 04/06/2022 03:13 PM    CALCIUM 9.2 08/10/2021 04:48 PM    CALCIUM 9.5 12/23/2019 06:50 AM       Last 3 LIPID:  Lab Results   Component Value Date/Time    CHOL 141 08/10/2021 04:48 PM    CHOL 96 09/05/2019 05:25 AM    CHOL 244 03/23/2016 04:36 PM    HDL 59 08/10/2021 04:48 PM    HDL 36 09/05/2019 05:25 AM    HDL 48 05/22/2019 04:33 PM    CHOLHDLRATIO 2.4 08/10/2021 04:48 PM signs of volume overload. Beta Blocker: Continue Metoprolol tartrate (Lopressor) 100 mg bid. ACE Inibitor/ARB: Continue lisinopril 20 mg daily. Diuretics: Continue furosemide (Lasix) 40 mg 2 times daily. Heart failure counseling: I advised them to try and keep their legs up whenever possible and to limit salt in their diet. Essential Hypertension: Controlled  Beta Blocker: Continue Metoprolol tartrate (Lopressor) 100 mg bid. ACE Inibitor/ARB: Continue lisinopril 20 mg daily. Diuretics: Continue furosemide (Lasix) 40 mg 2 times daily. Hyperlipidemia: Mixed- Last LDL on 8/10/2021 was 59 mg/dL   Cholesterol Reduction Therapy: Continue Atorvastatin (Lipitor) 40 mg daily. Morbid obesity: Body mass index is 41.27 kg/m². I also briefly discussed both diet and exercise strategies for him to continue to loses weight and he was very receptive to this. Finally, I recommended that he continue his other medications and follow up with you as previously scheduled. FOLLOW UP:   I told Mr. Maryann Connell to call my office if he had any problems, but otherwise I asked him to Return in about 6 months (around 1/21/2023). However, I would be happy to see him sooner should the need arise. Sincerely,  Nora Crisostomo MD, F.A.C.C. [facility] Cardiology Specialist    [address] [place] [address]  Phone: [phone], Fax: [phone]     I believe that the risk of significant morbidity and mortality related to the patient's current medical conditions are: Intermediate. Approximately 35 minutes were spent during prep work, discussion and exam of the patient, and follow up documentation and all of their questions were answered. The documentation recorded by the scribe, accurately and completely reflects the services I personally performed and the decisions made by me. Nora Crisostomo MD, F.A.C.C.  July 21, 2022

## 2022-09-14 DIAGNOSIS — I50.42 CHRONIC COMBINED SYSTOLIC AND DIASTOLIC CONGESTIVE HEART FAILURE (HCC): ICD-10-CM

## 2022-09-14 RX ORDER — FUROSEMIDE 40 MG/1
TABLET ORAL
Qty: 180 TABLET | Refills: 3 | Status: SHIPPED | OUTPATIENT
Start: 2022-09-14

## 2022-11-23 RX ORDER — METOPROLOL TARTRATE 100 MG/1
TABLET ORAL
Qty: 180 TABLET | Refills: 3 | Status: SHIPPED | OUTPATIENT
Start: 2022-11-23

## 2023-02-23 ENCOUNTER — OFFICE VISIT (OUTPATIENT)
Dept: CARDIOLOGY | Age: 53
End: 2023-02-23
Payer: COMMERCIAL

## 2023-02-23 VITALS
HEART RATE: 84 BPM | HEIGHT: 70 IN | DIASTOLIC BLOOD PRESSURE: 71 MMHG | RESPIRATION RATE: 18 BRPM | OXYGEN SATURATION: 98 % | WEIGHT: 297 LBS | BODY MASS INDEX: 42.52 KG/M2 | SYSTOLIC BLOOD PRESSURE: 111 MMHG

## 2023-02-23 DIAGNOSIS — E78.2 MIXED HYPERLIPIDEMIA: ICD-10-CM

## 2023-02-23 DIAGNOSIS — E66.01 MORBID OBESITY (HCC): ICD-10-CM

## 2023-02-23 DIAGNOSIS — I50.42 CHRONIC COMBINED SYSTOLIC AND DIASTOLIC CONGESTIVE HEART FAILURE (HCC): ICD-10-CM

## 2023-02-23 DIAGNOSIS — I48.0 PAROXYSMAL ATRIAL FIBRILLATION (HCC): Primary | ICD-10-CM

## 2023-02-23 DIAGNOSIS — I10 ESSENTIAL HYPERTENSION: ICD-10-CM

## 2023-02-23 DIAGNOSIS — Z98.890 S/P ABLATION OF ATRIAL FIBRILLATION: ICD-10-CM

## 2023-02-23 DIAGNOSIS — Z86.79 S/P ABLATION OF ATRIAL FIBRILLATION: ICD-10-CM

## 2023-02-23 PROCEDURE — 99214 OFFICE O/P EST MOD 30 MIN: CPT | Performed by: PHYSICIAN ASSISTANT

## 2023-02-23 PROCEDURE — 3078F DIAST BP <80 MM HG: CPT | Performed by: PHYSICIAN ASSISTANT

## 2023-02-23 PROCEDURE — 3074F SYST BP LT 130 MM HG: CPT | Performed by: PHYSICIAN ASSISTANT

## 2023-02-23 NOTE — PROGRESS NOTES
Patient: Remi Limon  : 1970  Date of Visit: 2023    REASON FOR VISIT / CONSULTATION: Follow-up (HX: hx of afib, CHF, HTN, HLD, obesity. Pt states he is doing well. Denies: CP, Palpitations, lightheaded/ dizziness, SOB.)      History of Present Illness:        Dear Jose Carlos Cadena, KILLIAN Porter CNP    I had the pleasure of seeing Remi Limon today. Mr. Corinne Face is a 50 y.o. male with a history of congestive heart failure and atrial fibrillation. He was admitted to the hospital on 2019 for shortness of breath. He was than found to be in atrial fibrillation with rapid ventricular response. He was also found to have low ejection fraction leading to cardiac catheterization. Cardiac cath on 2019 showed no significant epicardial CAD confirming the diagnosis of tachycardia induced cardiomyopathy. Patient underwent cardioversion on 2019, only maintained sinus rhythm for few minutes. Subsequently loaded with amiodarone and discharged on Eliquis 5 mg twice daily in addition to metoprolol 50 mg twice daily. Cardioversion done again on 2019 successful cardioversion to NSR with  200J biphasic. However, within about 1 minute, the patient went back into  atrial fibrillation with RVR. Most recent ejection fraction by echo on 2019 was 40%. Mild LVH. No significant valvular abnormalities. He underwent pulmonary vein isolation by Dr. Patrica Crespo on 2019. He reported being out of rhythm few times after the ablation. He has been treated with amiodarone since ablation procedure. He told me that he was only out of rhythm for few days after the ablation. He now knows when he is in A. fib and reported that he is maintaining his sinus rhythm for the past 6 months or so. EKG done in office 20: Normal sinus rhythm. Echocardiogram done on 2020 showed an EF of 55% Mild LV hypertrophy. Mild diastolic dysfunction is seen.      EKG done in office (2021): Normal sinus rhythm with a HR of 95 bpm    EKG done in office today 7/21/2022- normal sinus rhythm, no acute ischemic changes    Mr. Gemini Reynoso is here today for follow up. He has been doing well since his last visit. He states he has not noticed any episodes of atrial fibrillation since his last visit. He does continue to work daily and he is physically active at work. He does not use his CPAP machine nightly. His glucose is under control. He has had fatigue that is staying the same. He never had the CAM monitor done and admits to not having his blood work completed. He denied any chest pain, pressure or tightness. No significant shortness of breath. No passing out or near passing out episodes. No orthopnea or PND. No lightheaded/dizziness or palpitations. His weight is a stable. No nausea or vomiting. No abdominal pain, bleeding issues, bowel issues, problems with current medications or any other issues at this time. He drinks about two beers nightly to help him sleep. PAST MEDICAL HISTORY:        Past Medical History:   Diagnosis Date    History of cardiac cath 09/05/2019    [facility] [place]/Dr. Kimbrough/Right Radial     History of cardioversion 09/19/2019    Dr Wiley Nice    Idiopathic chronic gout of multiple sites without tophus     Obesity, unspecified     [facility]ox[facility]     [facility] Ceasar Cota, [facility]    Sleep apnea     non compliant cpap    Type II or unspecified type diabetes mellitus without mention of complication, not stated as uncontrolled     Unspecified essential hypertension        CURRENT ALLERGIES: Biaxin xl [clarithromycin] and Medrol [methylprednisolone] REVIEW OF SYSTEMS: 14 systems were reviewed. Pertinent positives and negatives as above, all else negative.      Past Surgical History:   Procedure Laterality Date    ANTERIOR CRUCIATE LIGAMENT REPAIR  2009    CARDIOVERSION  09/06/2019    Dr Simone Barker/200 joules then 250 joules- unsuccessful TRANSESOPHAGEAL ECHOCARDIOGRAM  09/06/2019    Dr Jeison Olivas cardioversion    Social History:  Social History     Tobacco Use    Smoking status: Never    Smokeless tobacco: Current     Types: Chew   Vaping Use    Vaping Use: Never used   Substance Use Topics    Alcohol use: Yes     Alcohol/week: 10.0 standard drinks     Types: 10 Cans of beer per week    Drug use: Not Currently        CURRENT MEDICATIONS:        Outpatient Medications Marked as Taking for the 2/23/23 encounter (Office Visit) with Cara Montilla PA-C   Medication Sig Dispense Refill    colchicine (COLCRYS) 0.6 MG tablet TAKE 1 TABLET EVERY OTHER  DAY 45 tablet 1    allopurinol (ZYLOPRIM) 300 MG tablet TAKE 1 TABLET DAILY 90 tablet 1    dulaglutide (TRULICITY) 1.5 IU/5.0IU SC injection Inject 0.5 mLs into the skin once a week 6 mL 1    empagliflozin (JARDIANCE) 10 MG tablet Take 1 tablet by mouth daily 90 tablet 1    lisinopril (PRINIVIL;ZESTRIL) 20 MG tablet Take 1 tablet by mouth daily 90 tablet 1    metFORMIN (GLUCOPHAGE) 1000 MG tablet Take 1 tablet by mouth 2 times daily (with meals) 180 tablet 1    atorvastatin (LIPITOR) 40 MG tablet Take 1 tablet by mouth daily TAKE 1 TABLET DAILY 90 tablet 1    metoprolol (LOPRESSOR) 100 MG tablet TAKE 1 TABLET TWICE A  tablet 3    glimepiride (AMARYL) 2 MG tablet TAKE 1 TABLET TWICE A  tablet 1    celecoxib (CELEBREX) 100 MG capsule Take 1 capsule by mouth 2 times daily 180 capsule 1    furosemide (LASIX) 40 MG tablet take 1 tablet by mouth twice a day 180 tablet 3    aspirin 325 MG EC tablet Take 1 tablet by mouth daily 30 tablet 3       FAMILY HISTORY: family history includes Cancer in his father; Heart Attack in his father. Physical Examination:     /71 (Site: Left Upper Arm, Position: Sitting, Cuff Size: Large Adult)   Pulse 84   Resp 18   Ht 5' 10\" (1.778 m)   Wt 297 lb (134.7 kg)   SpO2 98%   BMI 42.62 kg/m²  Body mass index is 42.62 kg/m². Constitutional: He appeared oriented to person and place. He appears well-developed and well-nourished. In no acute distress. HEENT: Normocephalic and atraumatic. No JVD present. Carotid bruit is not present. No mass and no thyromegaly present. No lymphadenopathy noted. Cardiovascular: Normal rate, regular rhythm, normal heart sounds. Exam reveals no gallop and no friction rubs. No murmur was heard. Pulmonary/Chest: Effort normal and breath sounds normal. No respiratory distress. He has no wheezes, rhonchi or rales. Abdominal: Soft, non-tender. He exhibits no organomegaly, mass or bruit. Extremities: 1/2+ edema 1/2 way up to knee. no cyanosis or clubbing. 2+ radial and carotid pulses. Distal extremity pulses: 2+ bilaterally. Neurological: Alertness and orientation as per Constitutional exam. No evidence of gross cranial nerve deficit. Coordination appeared normal.   Skin: Skin is warm and dry. There is no rash or diaphoresis. Psychiatric: He has a normal mood and affect.  His speech is normal and behavior is normal.        MOST RECENT LABS ON RECORD:   Lab Results   Component Value Date    WBC 7.9 08/10/2021    HGB 14.9 08/10/2021    HCT 44.0 08/10/2021     08/10/2021    CHOL 141 08/10/2021    TRIG 113 08/10/2021    HDL 59 08/10/2021    ALT 50 (H) 04/06/2022    AST 37 04/06/2022     04/06/2022    K 4.7 04/06/2022    CL 99 04/06/2022    CREATININE 0.84 04/06/2022    BUN 21 04/06/2022    CO2 27 04/06/2022    TSH 4.43 09/04/2019    GLUF 208 (H) 05/22/2019    LABA1C 7.7 (H) 04/06/2022    LABMICR 605 (H) 08/10/2021       Last 3 CBC:  Lab Results   Component Value Date/Time    WBC 7.9 08/10/2021 04:48 PM    WBC 7.8 12/23/2019 06:50 AM    WBC 8.4 09/04/2019 03:43 PM    RBC 4.61 08/10/2021 04:48 PM    RBC 4.77 12/23/2019 06:50 AM    RBC 4.31 09/04/2019 03:43 PM    HGB 14.9 08/10/2021 04:48 PM    HGB 15.0 12/23/2019 06:50 AM    HGB 13.4 09/04/2019 03:43 PM    HCT 44.0 08/10/2021 04:48 PM    HCT 45.7 12/23/2019 06:50 AM    HCT 43.8 09/04/2019 03:43 PM    MCV 95.4 08/10/2021 04:48 PM    MCV 95.8 12/23/2019 06:50 AM    .6 09/04/2019 03:43 PM    MCH 32.3 08/10/2021 04:48 PM    MCH 31.4 12/23/2019 06:50 AM    MCH 31.1 09/04/2019 03:43 PM    MCHC 33.9 08/10/2021 04:48 PM    MCHC 32.8 12/23/2019 06:50 AM    MCHC 30.6 09/04/2019 03:43 PM    RDW 12.6 08/10/2021 04:48 PM    RDW 12.8 09/04/2019 03:43 PM    RDW 13.4 04/10/2013 04:01 PM     08/10/2021 04:48 PM     12/23/2019 06:50 AM     09/04/2019 03:43 PM    MPV 10.8 08/10/2021 04:48 PM    MPV 11.6 12/23/2019 06:50 AM    MPV 11.4 09/04/2019 03:43 PM       Last 3 BMP:  Lab Results   Component Value Date/Time     04/06/2022 03:13 PM     08/10/2021 04:48 PM     12/23/2019 06:50 AM    K 4.7 04/06/2022 03:13 PM    K 4.2 08/10/2021 04:48 PM    K 4.3 12/23/2019 06:50 AM    CL 99 04/06/2022 03:13 PM    CL 94 08/10/2021 04:48 PM    CL 98 12/23/2019 06:50 AM    CO2 27 04/06/2022 03:13 PM    CO2 26 08/10/2021 04:48 PM    CO2 28 12/23/2019 06:50 AM    BUN 21 04/06/2022 03:13 PM    BUN 14 08/10/2021 04:48 PM    BUN 16 12/23/2019 06:50 AM    CREATININE 0.84 04/06/2022 03:13 PM    CREATININE 0.66 08/10/2021 04:48 PM    CREATININE 0.7 12/23/2019 06:50 AM    CALCIUM 9.8 04/06/2022 03:13 PM    CALCIUM 9.2 08/10/2021 04:48 PM    CALCIUM 9.5 12/23/2019 06:50 AM       Last 3 LIPID:  Lab Results   Component Value Date/Time    CHOL 141 08/10/2021 04:48 PM    CHOL 96 09/05/2019 05:25 AM    CHOL 244 03/23/2016 04:36 PM    HDL 59 08/10/2021 04:48 PM    HDL 36 09/05/2019 05:25 AM    HDL 48 05/22/2019 04:33 PM    CHOLHDLRATIO 2.4 08/10/2021 04:48 PM    CHOLHDLRATIO 2.7 09/05/2019 05:25 AM    CHOLHDLRATIO 2.8 05/22/2019 04:33 PM    TRIG 113 08/10/2021 04:48 PM    TRIG 75 09/05/2019 05:25 AM    TRIG 252 03/23/2016 04:36 PM    VLDL NOT REPORTED 08/10/2021 04:48 PM    VLDL NOT REPORTED 09/05/2019 05:25 AM    VLDL NOT REPORTED 05/22/2019 04:33 PM       Last 3 TROPONIN:  Lab Results   Component Value Date/Time    TROPONINT <0.03 09/05/2019 12:28 AM    TROPONINT <0.03 09/04/2019 03:43 PM       ASSESSMENT:     1. Paroxysmal atrial fibrillation (HCC)    2. S/P ablation of atrial fibrillation    3. Chronic combined systolic and diastolic congestive heart failure ([facility]    4. Essential hypertension    5. Mixed hyperlipidemia    6. Morbid obesity ([facility]           PLAN:        Paroxysmal trial fibrillation S/P cardioversion x2, S/P ablation on 12/23/2019. Currently maintaining sinus rhythm. Two episodes of atrial fibrillation in the last year. I had a very long discussion with him regarding possible precipitating factors for his atrial fibrillation. We also discussed starting him on flecainide but after going over possible side effects of this class of medicines, patient wants to continue conservative management. He told me he will call if he has more episodes of atrial fibrillation. His chads vascular score is 2 (hypertension and type 2 diabetes). He has remote history of heart failure that was secondary to tachycardia induced cardiomyopathy. His ejection fraction is now normal. I told him if he continues to have paroxysmal atrial fibrillation then we must use blood thinner. Again patient really think that his palpitations are very infrequent and he wants to continue current therapy. I told him that we will get a Cam monitor, continue aspirin and metoprolol and reevaluate him in 6 months. He has increased leg edema, I would like to do an echo but he declined at this time. Chronic combined heart failure: Nonischemic Cardiomyopathy: Tachycardia induced cardiomyopathy, ejection fraction improved to 55% according to his last echo on 8/14/2020. Currently no signs of volume overload. Beta Blocker: Continue Metoprolol tartrate (Lopressor) 100 mg bid. ACE Inibitor/ARB: Continue lisinopril 20 mg daily.   Diuretics: Continue furosemide (Lasix) 40 mg 2 times daily.  Heart failure counseling: I advised them to try and keep their legs up whenever possible and to limit salt in their diet. Additional Testing List: Additional Testing List:  I took the liberty of ordering a CBC. I told them that they could get their lab work performed at the location of their choosing, unfortunately, if the lab work was not performed at a [facility]) facility I could not guarantee my ability to follow up with them on their results. and I ordered a complete metabolic panel (CMP). I told them that they could get their lab work performed at the location of their choosing, unfortunately, if the lab work was not performed at a [facility]) facility I could not guarantee my ability to follow up with them on their results. Essential Hypertension: Controlled  Beta Blocker: Continue Metoprolol tartrate (Lopressor) 100 mg bid. ACE Inibitor/ARB: Continue lisinopril 20 mg daily. Diuretics: Continue furosemide (Lasix) 40 mg 2 times daily. Hyperlipidemia: Mixed- Last LDL on 8/10/2021 was 59 mg/dL   Cholesterol Reduction Therapy: Continue Atorvastatin (Lipitor) 40 mg daily. I also ordered a repeat lipid panel. Morbid obesity: Body mass index is 42.62 kg/m². I also briefly discussed both diet and exercise strategies for him to continue to loses weight and he was very receptive to this. Finally, I recommended that he continue his other medications and follow up with you as previously scheduled. FOLLOW UP:   I told Mr. Khoa Rivas to call my office if he had any problems, but otherwise I asked him to Return in about 6 months (around 8/23/2023). However, I would be happy to see him sooner should the need arise. Sincerely,  Alecia Brandon MD, F.A.C.C.   [facility]    [address] [facility], [address] [place] [address]  Phone: [phone], Fax: [phone]     I believe that the risk of significant morbidity and mortality related to the patient's current medical conditions are: Intermediate. Approximately 35 minutes were spent during prep work, discussion and exam of the patient, and follow up documentation and all of their questions were answered. The documentation recorded by the scribe, accurately and completely reflects the services I personally performed and the decisions made by me. Tammy Hatch MD, F.A.C.C.  February 23, 2023

## 2023-02-23 NOTE — PATIENT INSTRUCTIONS
SURVEY:    You may be receiving a survey from [employer] regarding your visit today. Please complete the survey to enable us to provide the highest quality of care to you and your family. If you cannot score us a very good on any question, please call the office to discuss how we could have made your experience a very good one. Thank you.

## 2023-08-31 ENCOUNTER — OFFICE VISIT (OUTPATIENT)
Dept: CARDIOLOGY | Age: 53
End: 2023-08-31
Payer: COMMERCIAL

## 2023-08-31 VITALS
DIASTOLIC BLOOD PRESSURE: 71 MMHG | SYSTOLIC BLOOD PRESSURE: 131 MMHG | WEIGHT: 306.6 LBS | BODY MASS INDEX: 43.89 KG/M2 | HEART RATE: 88 BPM | RESPIRATION RATE: 18 BRPM | HEIGHT: 70 IN | OXYGEN SATURATION: 95 %

## 2023-08-31 DIAGNOSIS — Z98.890 S/P ABLATION OF ATRIAL FIBRILLATION: ICD-10-CM

## 2023-08-31 DIAGNOSIS — I48.0 PAROXYSMAL ATRIAL FIBRILLATION (HCC): Primary | ICD-10-CM

## 2023-08-31 DIAGNOSIS — I50.42 CHRONIC COMBINED SYSTOLIC AND DIASTOLIC CONGESTIVE HEART FAILURE (HCC): ICD-10-CM

## 2023-08-31 DIAGNOSIS — Z86.79 S/P ABLATION OF ATRIAL FIBRILLATION: ICD-10-CM

## 2023-08-31 DIAGNOSIS — I10 ESSENTIAL HYPERTENSION: ICD-10-CM

## 2023-08-31 DIAGNOSIS — Z78.9 INTOLERANCE OF CONTINUOUS POSITIVE AIRWAY PRESSURE (CPAP) VENTILATION: ICD-10-CM

## 2023-08-31 DIAGNOSIS — E66.01 MORBID OBESITY (HCC): ICD-10-CM

## 2023-08-31 DIAGNOSIS — G47.33 OSA (OBSTRUCTIVE SLEEP APNEA): ICD-10-CM

## 2023-08-31 PROCEDURE — 3078F DIAST BP <80 MM HG: CPT | Performed by: INTERNAL MEDICINE

## 2023-08-31 PROCEDURE — 93000 ELECTROCARDIOGRAM COMPLETE: CPT | Performed by: INTERNAL MEDICINE

## 2023-08-31 PROCEDURE — 99213 OFFICE O/P EST LOW 20 MIN: CPT | Performed by: INTERNAL MEDICINE

## 2023-08-31 PROCEDURE — 3075F SYST BP GE 130 - 139MM HG: CPT | Performed by: INTERNAL MEDICINE

## 2023-08-31 NOTE — PATIENT INSTRUCTIONS
SURVEY:    You may be receiving a survey from [employer] regarding your visit today. Please complete the survey to enable us to provide the highest quality of care to you and your family. If you cannot score us a very good on any question, please call the office to discuss how we could have made your experience a very good one. Thank you.

## 2023-09-26 DIAGNOSIS — I50.42 CHRONIC COMBINED SYSTOLIC AND DIASTOLIC CONGESTIVE HEART FAILURE (HCC): ICD-10-CM

## 2023-09-26 DIAGNOSIS — I10 ESSENTIAL HYPERTENSION: Primary | ICD-10-CM

## 2023-09-26 RX ORDER — FUROSEMIDE 40 MG/1
TABLET ORAL
Qty: 180 TABLET | Refills: 3 | Status: SHIPPED | OUTPATIENT
Start: 2023-09-26

## 2023-10-13 ENCOUNTER — HOSPITAL ENCOUNTER (EMERGENCY)
Age: 53
Discharge: HOME | End: 2023-10-13
Attending: EMERGENCY MEDICINE
Payer: COMMERCIAL

## 2023-10-13 VITALS
BODY MASS INDEX: 42.95 KG/M2 | OXYGEN SATURATION: 97 % | WEIGHT: 300 LBS | HEIGHT: 70 IN | RESPIRATION RATE: 15 BRPM | SYSTOLIC BLOOD PRESSURE: 123 MMHG | HEART RATE: 97 BPM | TEMPERATURE: 98 F | DIASTOLIC BLOOD PRESSURE: 67 MMHG

## 2023-10-13 DIAGNOSIS — I48.3 TYPICAL ATRIAL FLUTTER (HCC): Primary | ICD-10-CM

## 2023-10-13 LAB
ANION GAP SERPL CALCULATED.3IONS-SCNC: 14 MMOL/L (ref 9–17)
BASOPHILS # BLD: 0.07 K/UL (ref 0–0.2)
BASOPHILS NFR BLD: 1 % (ref 0–2)
BUN SERPL-MCNC: 23 MG/DL (ref 6–20)
BUN/CREAT SERPL: 29 (ref 9–20)
CALCIUM SERPL-MCNC: 10.1 MG/DL (ref 8.6–10.4)
CHLORIDE SERPL-SCNC: 94 MMOL/L (ref 98–107)
CO2 SERPL-SCNC: 27 MMOL/L (ref 20–31)
CREAT SERPL-MCNC: 0.8 MG/DL (ref 0.7–1.2)
EKG ATRIAL RATE: 306 BPM
EKG ATRIAL RATE: 308 BPM
EKG Q-T INTERVAL: 278 MS
EKG Q-T INTERVAL: 322 MS
EKG QRS DURATION: 68 MS
EKG QRS DURATION: 70 MS
EKG QTC CALCULATION (BAZETT): 433 MS
EKG QTC CALCULATION (BAZETT): 443 MS
EKG R AXIS: 42 DEGREES
EKG R AXIS: 55 DEGREES
EKG T AXIS: -2 DEGREES
EKG T AXIS: 51 DEGREES
EKG VENTRICULAR RATE: 109 BPM
EKG VENTRICULAR RATE: 153 BPM
EOSINOPHIL # BLD: 0.33 K/UL (ref 0–0.44)
EOSINOPHILS RELATIVE PERCENT: 4 % (ref 1–4)
ERYTHROCYTE [DISTWIDTH] IN BLOOD BY AUTOMATED COUNT: 12.6 % (ref 11.8–14.4)
GFR SERPL CREATININE-BSD FRML MDRD: >60 ML/MIN/1.73M2
GLUCOSE SERPL-MCNC: 120 MG/DL (ref 70–99)
HCT VFR BLD AUTO: 50.1 % (ref 40.7–50.3)
HGB BLD-MCNC: 16.8 G/DL (ref 13–17)
IMM GRANULOCYTES # BLD AUTO: <0.03 K/UL (ref 0–0.3)
IMM GRANULOCYTES NFR BLD: 0 %
LYMPHOCYTES NFR BLD: 2.44 K/UL (ref 1.1–3.7)
LYMPHOCYTES RELATIVE PERCENT: 31 % (ref 24–43)
MAGNESIUM SERPL-MCNC: 2 MG/DL (ref 1.6–2.6)
MCH RBC QN AUTO: 33 PG (ref 25.2–33.5)
MCHC RBC AUTO-ENTMCNC: 33.5 G/DL (ref 28.4–34.8)
MCV RBC AUTO: 98.4 FL (ref 82.6–102.9)
MONOCYTES NFR BLD: 0.84 K/UL (ref 0.1–1.2)
MONOCYTES NFR BLD: 11 % (ref 3–12)
NEUTROPHILS NFR BLD: 53 % (ref 36–65)
NEUTS SEG NFR BLD: 4.14 K/UL (ref 1.5–8.1)
NRBC BLD-RTO: 0 PER 100 WBC
PLATELET # BLD AUTO: 213 K/UL (ref 138–453)
PMV BLD AUTO: 11 FL (ref 8.1–13.5)
POTASSIUM SERPL-SCNC: 4.8 MMOL/L (ref 3.7–5.3)
RBC # BLD AUTO: 5.09 M/UL (ref 4.21–5.77)
SODIUM SERPL-SCNC: 135 MMOL/L (ref 135–144)
TROPONIN I SERPL HS-MCNC: 16 NG/L (ref 0–22)
WBC OTHER # BLD: 7.8 K/UL (ref 3.5–11.3)

## 2023-10-13 PROCEDURE — 96374 THER/PROPH/DIAG INJ IV PUSH: CPT

## 2023-10-13 PROCEDURE — 83735 ASSAY OF MAGNESIUM: CPT

## 2023-10-13 PROCEDURE — 36415 COLL VENOUS BLD VENIPUNCTURE: CPT

## 2023-10-13 PROCEDURE — 93005 ELECTROCARDIOGRAM TRACING: CPT | Performed by: EMERGENCY MEDICINE

## 2023-10-13 PROCEDURE — 84484 ASSAY OF TROPONIN QUANT: CPT

## 2023-10-13 PROCEDURE — 2580000003 HC RX 258: Performed by: EMERGENCY MEDICINE

## 2023-10-13 PROCEDURE — 85025 COMPLETE CBC W/AUTO DIFF WBC: CPT

## 2023-10-13 PROCEDURE — 93010 ELECTROCARDIOGRAM REPORT: CPT | Performed by: FAMILY MEDICINE

## 2023-10-13 PROCEDURE — 2500000003 HC RX 250 WO HCPCS: Performed by: EMERGENCY MEDICINE

## 2023-10-13 PROCEDURE — 6370000000 HC RX 637 (ALT 250 FOR IP): Performed by: EMERGENCY MEDICINE

## 2023-10-13 PROCEDURE — 80048 BASIC METABOLIC PNL TOTAL CA: CPT

## 2023-10-13 PROCEDURE — 99284 EMERGENCY DEPT VISIT MOD MDM: CPT

## 2023-10-13 RX ORDER — 0.9 % SODIUM CHLORIDE 0.9 %
1000 INTRAVENOUS SOLUTION INTRAVENOUS ONCE
Status: COMPLETED | OUTPATIENT
Start: 2023-10-13 | End: 2023-10-13

## 2023-10-13 RX ORDER — DILTIAZEM HYDROCHLORIDE 120 MG/1
120 CAPSULE, COATED, EXTENDED RELEASE ORAL ONCE
Status: COMPLETED | OUTPATIENT
Start: 2023-10-13 | End: 2023-10-13

## 2023-10-13 RX ORDER — DILTIAZEM HYDROCHLORIDE 5 MG/ML
30 INJECTION INTRAVENOUS ONCE
Status: COMPLETED | OUTPATIENT
Start: 2023-10-13 | End: 2023-10-13

## 2023-10-13 RX ORDER — DILTIAZEM HYDROCHLORIDE 120 MG/1
120 CAPSULE, EXTENDED RELEASE ORAL 2 TIMES DAILY
Qty: 60 CAPSULE | Refills: 3 | Status: SHIPPED | OUTPATIENT
Start: 2023-10-13

## 2023-10-13 RX ADMIN — APIXABAN 5 MG: 5 TABLET, FILM COATED ORAL at 18:23

## 2023-10-13 RX ADMIN — DILTIAZEM HYDROCHLORIDE 30 MG: 5 INJECTION INTRAVENOUS at 17:37

## 2023-10-13 RX ADMIN — SODIUM CHLORIDE 1000 ML: 9 INJECTION, SOLUTION INTRAVENOUS at 18:06

## 2023-10-13 RX ADMIN — DILTIAZEM HYDROCHLORIDE 120 MG: 120 CAPSULE, EXTENDED RELEASE ORAL at 18:23

## 2023-10-13 NOTE — DISCHARGE INSTRUCTIONS
Follow-up with Dr. Etta Morrison as directed. Return to the ED if you develop any chest pain, shortness of breath, nausea, vomiting or other concerns.

## 2023-10-13 NOTE — CONSULTS
04:48 PM    VLDL NOT REPORTED 09/05/2019 05:25 AM    VLDL NOT REPORTED 05/22/2019 04:33 PM       Last 3 TROPONIN:  Lab Results   Component Value Date/Time    TROPONINT <0.03 09/05/2019 12:28 AM    TROPONINT <0.03 09/04/2019 03:43 PM       ASSESSMENT:     1. Typical atrial flutter (HCC)      PLAN:        History of paroxysmal trial fibrillation S/P cardioversion x2, S/P ablation on 12/23/2019. Was seen today from his PCPs office because of tachycardia, found to have atrial flutter with 2-1 conduction. He was given diltiazem and his heart rate became much better controlled. He remains in atrial flutter with variable block. He is not using his CPAP machine. He does not feel palpitations. No dizziness, lightheadedness, presyncope or syncopal episodes. No significant shortness of breath, orthopnea or PND. No chest pain. Prior cardiac cath back in 2019 showed normal coronary arteries. I tried to convince him to stay in the hospital for rate control and anticoagulation. We were planning to do a INGA guided cardioversion on Monday as an inpatient but patient insisted to go home. I told him we will discharge him on diltiazem 120 mg twice daily in addition to Eliquis 5 mg twice daily and bring him back on Monday for INGA guided cardioversion as an outpatient but I counseled him extensively to come to the emergency room if he has worsening palpitations, shortness of breath, dizziness or chest pain. I had a very long discussion with him regarding possible precipitating factors for his atrial fibrillation. Chronic combined heart failure: Nonischemic Cardiomyopathy: Tachycardia induced cardiomyopathy, ejection fraction improved to 55% Currently no signs of volume overload. Beta Blocker: Continue Metoprolol tartrate (Lopressor) 100 mg bid. ACE Inibitor/ARB: Continue lisinopril 20 mg daily. Diuretics: Continue furosemide (Lasix) 40 mg 2 times daily.   Heart failure counseling: I advised them to try and keep their

## 2023-10-13 NOTE — ED PROVIDER NOTES
[address] [facility]      Pt Name: Hannah Machado  MRN: [id number]  [id number] 1970  Date of evaluation: 10/13/2023  Provider: Gigi Guthrie MD    CHIEF COMPLAINT       Chief Complaint   Patient presents with    Tachycardia     Tachycardia, sent from [address] [place] [address] office for SVT          HISTORY OF PRESENT ILLNESS      Hannah Machado is a 48 y.o. male who presents to the emergency department for evaluation of rapid heart rate from primary care clinic. Patient states that he was being seen for routine visit and was told at that visit that he was in SVT. Heart rate was around 150. However, patient denies any palpitations, chest pain, shortness of breath, fatigue or other complaints. He has a history of atrial fibrillation with a prior ablation a few years ago. States that he is presently taking Mazin aspirin as a \"blood thinner\" but is otherwise not anticoagulated. He denies any recent illness, fever, or other complaint.       PAST MEDICAL HISTORY     Past Medical History:   Diagnosis Date    History of cardiac cath 09/05/2019    [employer]/Dr. Kimbrough/Right Radial     History of cardioversion 09/19/2019    Dr Murillo Qualkorey    Idiopathic chronic gout of multiple sites without tophus     Obesity, unspecified     PAF (paroxysmal atrial fibrillation) ([address])     Dr. Lorie Bah, T    Sleep apnea     non compliant cpap    SVT (supraventricular tachycardia) 10/13/2023    Type II or unspecified type diabetes mellitus without mention of complication, not stated as uncontrolled     Unspecified essential hypertension          SURGICAL HISTORY       Past Surgical History:   Procedure Laterality Date    ANTERIOR CRUCIATE LIGAMENT REPAIR  2009    CARDIOVERSION  09/06/2019    Dr Laith Barker/200 joules then 250 joules- unsuccessful    TRANSESOPHAGEAL ECHOCARDIOGRAM  09/06/2019    Dr Raulito Mcguire (2) Male

## 2023-10-17 ENCOUNTER — HOSPITAL ENCOUNTER (OUTPATIENT)
Age: 53
Setting detail: OUTPATIENT SURGERY
Discharge: HOME | End: 2023-10-19
Attending: INTERNAL MEDICINE

## 2023-10-17 ENCOUNTER — HOSPITAL ENCOUNTER (OUTPATIENT)
Age: 53
Discharge: HOME | End: 2023-10-19
Payer: COMMERCIAL

## 2023-10-17 ENCOUNTER — OFFICE VISIT (OUTPATIENT)
Dept: CARDIOLOGY | Age: 53
End: 2023-10-17
Attending: INTERNAL MEDICINE
Payer: COMMERCIAL

## 2023-10-17 VITALS
SYSTOLIC BLOOD PRESSURE: 129 MMHG | HEART RATE: 80 BPM | DIASTOLIC BLOOD PRESSURE: 76 MMHG | RESPIRATION RATE: 18 BRPM | OXYGEN SATURATION: 95 % | BODY MASS INDEX: 42.66 KG/M2 | HEIGHT: 70 IN | WEIGHT: 298 LBS

## 2023-10-17 DIAGNOSIS — Z79.01 CHRONIC ANTICOAGULATION: ICD-10-CM

## 2023-10-17 DIAGNOSIS — I50.42 CHRONIC COMBINED SYSTOLIC AND DIASTOLIC CONGESTIVE HEART FAILURE (HCC): ICD-10-CM

## 2023-10-17 DIAGNOSIS — E66.01 MORBID OBESITY (HCC): ICD-10-CM

## 2023-10-17 DIAGNOSIS — G47.33 OSA (OBSTRUCTIVE SLEEP APNEA): ICD-10-CM

## 2023-10-17 DIAGNOSIS — I10 ESSENTIAL HYPERTENSION: ICD-10-CM

## 2023-10-17 DIAGNOSIS — I42.8 NICM (NONISCHEMIC CARDIOMYOPATHY) (HCC): ICD-10-CM

## 2023-10-17 DIAGNOSIS — I48.0 PAF (PAROXYSMAL ATRIAL FIBRILLATION) (HCC): ICD-10-CM

## 2023-10-17 DIAGNOSIS — I48.0 PAF (PAROXYSMAL ATRIAL FIBRILLATION) (HCC): Primary | ICD-10-CM

## 2023-10-17 DIAGNOSIS — E78.2 MIXED HYPERLIPIDEMIA: ICD-10-CM

## 2023-10-17 DIAGNOSIS — I48.4 ATYPICAL ATRIAL FLUTTER (HCC): ICD-10-CM

## 2023-10-17 LAB — ECHO BSA: 2.58 M2

## 2023-10-17 PROCEDURE — 3074F SYST BP LT 130 MM HG: CPT | Performed by: INTERNAL MEDICINE

## 2023-10-17 PROCEDURE — 3078F DIAST BP <80 MM HG: CPT | Performed by: INTERNAL MEDICINE

## 2023-10-17 PROCEDURE — 99214 OFFICE O/P EST MOD 30 MIN: CPT | Performed by: INTERNAL MEDICINE

## 2023-10-17 PROCEDURE — 93271 ECG/MONITORING AND ANALYSIS: CPT

## 2023-10-17 NOTE — PROGRESS NOTES
Kamryn Sue am scribing for and in the presence of Lorin Whitney MD, F.A.C.C..    Patient: Chidi Pablo  : 1970  Date of Visit: 2023    REASON FOR VISIT / CONSULTATION: Atrial Fibrillation (HX: PAF. Feels ok. Denies: CP, Palpitations, SOB, lightheaded/ dizziness. )    History of Present Illness:        Dear Oneil Macias, KILLIAN - CNP    I had the pleasure of seeing Chidi Pablo today. Mr. Gricelda Khan is a 48 y.o. male with a history of congestive heart failure and atrial fibrillation. He was admitted to the hospital on 2019 for shortness of breath. He was than found to be in atrial fibrillation with rapid ventricular response. He was also found to have low ejection fraction leading to cardiac catheterization. Cardiac cath on 2019 showed no significant epicardial CAD confirming the diagnosis of tachycardia induced cardiomyopathy. Patient underwent cardioversion on 2019, only maintained sinus rhythm for few minutes. Subsequently loaded with amiodarone and discharged on Eliquis 5 mg twice daily in addition to metoprolol 50 mg twice daily. Cardioversion done again on 2019 successful cardioversion to NSR with  200J biphasic. However, within about 1 minute, the patient went back into  atrial fibrillation with RVR. Most recent ejection fraction by echo on 2019 was 40%. Mild LVH. No significant valvular abnormalities. He underwent pulmonary vein isolation by Dr. Tonya Wright on 2019. He reported being out of rhythm few times after the ablation. He has been treated with amiodarone since ablation procedure. He told me that he was only out of rhythm for few days after the ablation. He now knows when he is in A. fib and reported that he is maintaining his sinus rhythm for the past 6 months or so. EKG done in office 20: Normal sinus rhythm. Echocardiogram done on 2020 showed an EF of 55% Mild LV hypertrophy.  Mild diastolic dysfunction

## 2023-10-17 NOTE — PATIENT INSTRUCTIONS
SURVEY:    You may be receiving a survey from [employer] regarding your visit today. Please complete the survey to enable us to provide the highest quality of care to you and your family. If you cannot score us a very good on any question, please call the office to discuss how we could have made your experience a very good one. Thank you.

## 2023-10-27 ENCOUNTER — APPOINTMENT (OUTPATIENT)
Dept: VASCULAR LAB | Age: 53
DRG: 638 | End: 2023-10-27
Payer: COMMERCIAL

## 2023-10-27 ENCOUNTER — HOSPITAL ENCOUNTER (INPATIENT)
Age: 53
LOS: 6 days | Discharge: ANOTHER HOSPITAL | DRG: 638 | End: 2023-11-02
Attending: EMERGENCY MEDICINE | Admitting: INTERNAL MEDICINE
Payer: COMMERCIAL

## 2023-10-27 ENCOUNTER — APPOINTMENT (OUTPATIENT)
Dept: GENERAL RADIOLOGY | Age: 53
DRG: 638 | End: 2023-10-27
Payer: COMMERCIAL

## 2023-10-27 ENCOUNTER — APPOINTMENT (OUTPATIENT)
Dept: CT IMAGING | Age: 53
DRG: 638 | End: 2023-10-27
Payer: COMMERCIAL

## 2023-10-27 DIAGNOSIS — E86.0 DEHYDRATION: Primary | ICD-10-CM

## 2023-10-27 DIAGNOSIS — G47.33 OBSTRUCTIVE SLEEP APNEA: ICD-10-CM

## 2023-10-27 LAB
ALBUMIN SERPL-MCNC: 3.9 G/DL (ref 3.5–5.2)
ALBUMIN/GLOB SERPL: 1.2 {RATIO} (ref 1–2.5)
ALP SERPL-CCNC: 61 U/L (ref 40–129)
ALT SERPL-CCNC: 29 U/L (ref 5–41)
ANION GAP SERPL CALCULATED.3IONS-SCNC: 14 MMOL/L (ref 9–17)
AST SERPL-CCNC: 26 U/L
BACTERIA URNS QL MICRO: ABNORMAL
BASOPHILS # BLD: 0 K/UL (ref 0–0.2)
BASOPHILS NFR BLD: 0 % (ref 0–2)
BILIRUB SERPL-MCNC: 0.4 MG/DL (ref 0.3–1.2)
BILIRUB UR QL STRIP: NEGATIVE
BUN SERPL-MCNC: 32 MG/DL (ref 6–20)
BUN/CREAT SERPL: 21 (ref 9–20)
CALCIUM SERPL-MCNC: 8.8 MG/DL (ref 8.6–10.4)
CASTS #/AREA URNS LPF: ABNORMAL /LPF
CHLORIDE SERPL-SCNC: 86 MMOL/L (ref 98–107)
CLARITY UR: CLEAR
CO2 SERPL-SCNC: 24 MMOL/L (ref 20–31)
COLOR UR: YELLOW
CREAT SERPL-MCNC: 1.5 MG/DL (ref 0.7–1.2)
EKG ATRIAL RATE: 70 BPM
EKG Q-T INTERVAL: 424 MS
EKG QRS DURATION: 78 MS
EKG QTC CALCULATION (BAZETT): 460 MS
EKG R AXIS: 55 DEGREES
EKG T AXIS: 38 DEGREES
EKG VENTRICULAR RATE: 71 BPM
EOSINOPHIL # BLD: 0 K/UL (ref 0–0.44)
EOSINOPHILS RELATIVE PERCENT: 0 % (ref 1–4)
EPI CELLS #/AREA URNS HPF: ABNORMAL /HPF (ref 0–5)
ERYTHROCYTE [DISTWIDTH] IN BLOOD BY AUTOMATED COUNT: 13.2 % (ref 11.8–14.4)
EST. AVERAGE GLUCOSE BLD GHB EST-MCNC: 146 MG/DL
GFR SERPL CREATININE-BSD FRML MDRD: 56 ML/MIN/1.73M2
GLUCOSE BLD-MCNC: 254 MG/DL (ref 74–100)
GLUCOSE SERPL-MCNC: 213 MG/DL (ref 70–99)
GLUCOSE UR STRIP-MCNC: ABNORMAL MG/DL
HBA1C MFR BLD: 6.7 % (ref 4–6)
HCT VFR BLD AUTO: 43.9 % (ref 40.7–50.3)
HGB BLD-MCNC: 14.8 G/DL (ref 13–17)
HGB UR QL STRIP.AUTO: ABNORMAL
IMM GRANULOCYTES # BLD AUTO: 0.12 K/UL (ref 0–0.3)
IMM GRANULOCYTES NFR BLD: 1 %
KETONES UR STRIP-MCNC: NEGATIVE MG/DL
LACTATE BLDV-SCNC: 1.4 MMOL/L (ref 0.5–2.2)
LACTATE BLDV-SCNC: 1.5 MMOL/L (ref 0.5–2.2)
LACTATE BLDV-SCNC: 3.1 MMOL/L (ref 0.5–2.2)
LEUKOCYTE ESTERASE UR QL STRIP: NEGATIVE
LYMPHOCYTES NFR BLD: 1.1 K/UL (ref 1.1–3.7)
LYMPHOCYTES RELATIVE PERCENT: 9 % (ref 24–43)
MAGNESIUM SERPL-MCNC: 1.9 MG/DL (ref 1.6–2.6)
MCH RBC QN AUTO: 33.4 PG (ref 25.2–33.5)
MCHC RBC AUTO-ENTMCNC: 33.7 G/DL (ref 28.4–34.8)
MCV RBC AUTO: 99.1 FL (ref 82.6–102.9)
MONOCYTES NFR BLD: 0.85 K/UL (ref 0.1–1.2)
MONOCYTES NFR BLD: 7 % (ref 3–12)
MORPHOLOGY: NORMAL
MUCOUS THREADS URNS QL MICRO: ABNORMAL
NEUTROPHILS NFR BLD: 83 % (ref 36–65)
NEUTS SEG NFR BLD: 10.13 K/UL (ref 1.5–8.1)
NITRITE UR QL STRIP: NEGATIVE
NRBC BLD-RTO: 0 PER 100 WBC
PH UR STRIP: 5.5 [PH] (ref 5–9)
PLATELET # BLD AUTO: 143 K/UL (ref 138–453)
PMV BLD AUTO: 11.1 FL (ref 8.1–13.5)
POTASSIUM SERPL-SCNC: 4.7 MMOL/L (ref 3.7–5.3)
PROT SERPL-MCNC: 7.2 G/DL (ref 6.4–8.3)
PROT UR STRIP-MCNC: ABNORMAL MG/DL
RBC # BLD AUTO: 4.43 M/UL (ref 4.21–5.77)
RBC #/AREA URNS HPF: ABNORMAL /HPF (ref 0–2)
SARS-COV-2 RDRP RESP QL NAA+PROBE: NOT DETECTED
SODIUM SERPL-SCNC: 124 MMOL/L (ref 135–144)
SP GR UR STRIP: 1.02 (ref 1.01–1.02)
SPECIMEN DESCRIPTION: NORMAL
T4 FREE SERPL-MCNC: 1.3 NG/DL (ref 0.9–1.7)
TROPONIN I SERPL HS-MCNC: 19 NG/L (ref 0–22)
TROPONIN I SERPL HS-MCNC: 27 NG/L (ref 0–22)
TSH SERPL DL<=0.05 MIU/L-ACNC: 6.26 UIU/ML (ref 0.3–5)
UROBILINOGEN UR STRIP-ACNC: NORMAL EU/DL (ref 0–1)
WBC #/AREA URNS HPF: ABNORMAL /HPF (ref 0–5)
WBC OTHER # BLD: 12.2 K/UL (ref 3.5–11.3)

## 2023-10-27 PROCEDURE — C9803 HOPD COVID-19 SPEC COLLECT: HCPCS

## 2023-10-27 PROCEDURE — 87635 SARS-COV-2 COVID-19 AMP PRB: CPT

## 2023-10-27 PROCEDURE — 6370000000 HC RX 637 (ALT 250 FOR IP): Performed by: INTERNAL MEDICINE

## 2023-10-27 PROCEDURE — 81001 URINALYSIS AUTO W/SCOPE: CPT

## 2023-10-27 PROCEDURE — 93971 EXTREMITY STUDY: CPT

## 2023-10-27 PROCEDURE — 2500000003 HC RX 250 WO HCPCS: Performed by: EMERGENCY MEDICINE

## 2023-10-27 PROCEDURE — 71045 X-RAY EXAM CHEST 1 VIEW: CPT

## 2023-10-27 PROCEDURE — 6370000000 HC RX 637 (ALT 250 FOR IP): Performed by: NURSE PRACTITIONER

## 2023-10-27 PROCEDURE — 96375 TX/PRO/DX INJ NEW DRUG ADDON: CPT

## 2023-10-27 PROCEDURE — 94761 N-INVAS EAR/PLS OXIMETRY MLT: CPT

## 2023-10-27 PROCEDURE — 84443 ASSAY THYROID STIM HORMONE: CPT

## 2023-10-27 PROCEDURE — 99285 EMERGENCY DEPT VISIT HI MDM: CPT

## 2023-10-27 PROCEDURE — 93010 ELECTROCARDIOGRAM REPORT: CPT | Performed by: INTERNAL MEDICINE

## 2023-10-27 PROCEDURE — 96374 THER/PROPH/DIAG INJ IV PUSH: CPT

## 2023-10-27 PROCEDURE — 93005 ELECTROCARDIOGRAM TRACING: CPT | Performed by: EMERGENCY MEDICINE

## 2023-10-27 PROCEDURE — 83605 ASSAY OF LACTIC ACID: CPT

## 2023-10-27 PROCEDURE — 83036 HEMOGLOBIN GLYCOSYLATED A1C: CPT

## 2023-10-27 PROCEDURE — 93971 EXTREMITY STUDY: CPT | Performed by: SURGERY

## 2023-10-27 PROCEDURE — 96361 HYDRATE IV INFUSION ADD-ON: CPT

## 2023-10-27 PROCEDURE — 2580000003 HC RX 258: Performed by: NURSE PRACTITIONER

## 2023-10-27 PROCEDURE — 84484 ASSAY OF TROPONIN QUANT: CPT

## 2023-10-27 PROCEDURE — 87040 BLOOD CULTURE FOR BACTERIA: CPT

## 2023-10-27 PROCEDURE — 82947 ASSAY GLUCOSE BLOOD QUANT: CPT

## 2023-10-27 PROCEDURE — 6360000002 HC RX W HCPCS: Performed by: EMERGENCY MEDICINE

## 2023-10-27 PROCEDURE — 1200000000 HC SEMI PRIVATE

## 2023-10-27 PROCEDURE — 83735 ASSAY OF MAGNESIUM: CPT

## 2023-10-27 PROCEDURE — 70450 CT HEAD/BRAIN W/O DYE: CPT

## 2023-10-27 PROCEDURE — 2580000003 HC RX 258: Performed by: EMERGENCY MEDICINE

## 2023-10-27 PROCEDURE — 84439 ASSAY OF FREE THYROXINE: CPT

## 2023-10-27 PROCEDURE — 80053 COMPREHEN METABOLIC PANEL: CPT

## 2023-10-27 PROCEDURE — 85025 COMPLETE CBC W/AUTO DIFF WBC: CPT

## 2023-10-27 PROCEDURE — 36415 COLL VENOUS BLD VENIPUNCTURE: CPT

## 2023-10-27 RX ORDER — GLUCAGON 1 MG/ML
1 KIT INJECTION PRN
Status: DISCONTINUED | OUTPATIENT
Start: 2023-10-27 | End: 2023-11-02 | Stop reason: HOSPADM

## 2023-10-27 RX ORDER — ONDANSETRON 4 MG/1
4 TABLET, ORALLY DISINTEGRATING ORAL EVERY 8 HOURS PRN
Status: DISCONTINUED | OUTPATIENT
Start: 2023-10-27 | End: 2023-11-02 | Stop reason: HOSPADM

## 2023-10-27 RX ORDER — FAMOTIDINE 20 MG/1
20 TABLET, FILM COATED ORAL 2 TIMES DAILY
Status: DISCONTINUED | OUTPATIENT
Start: 2023-10-27 | End: 2023-11-02 | Stop reason: HOSPADM

## 2023-10-27 RX ORDER — ACETAMINOPHEN 650 MG/1
650 SUPPOSITORY RECTAL EVERY 6 HOURS PRN
Status: DISCONTINUED | OUTPATIENT
Start: 2023-10-27 | End: 2023-11-02 | Stop reason: HOSPADM

## 2023-10-27 RX ORDER — ATORVASTATIN CALCIUM 40 MG/1
40 TABLET, FILM COATED ORAL DAILY
Status: DISCONTINUED | OUTPATIENT
Start: 2023-10-28 | End: 2023-11-02 | Stop reason: HOSPADM

## 2023-10-27 RX ORDER — ASPIRIN 325 MG
325 TABLET, DELAYED RELEASE (ENTERIC COATED) ORAL DAILY
Status: DISCONTINUED | OUTPATIENT
Start: 2023-10-28 | End: 2023-11-02 | Stop reason: HOSPADM

## 2023-10-27 RX ORDER — INSULIN LISPRO 100 [IU]/ML
0-4 INJECTION, SOLUTION INTRAVENOUS; SUBCUTANEOUS
Status: DISCONTINUED | OUTPATIENT
Start: 2023-10-27 | End: 2023-11-02 | Stop reason: HOSPADM

## 2023-10-27 RX ORDER — SODIUM CHLORIDE 0.9 % (FLUSH) 0.9 %
10 SYRINGE (ML) INJECTION PRN
Status: DISCONTINUED | OUTPATIENT
Start: 2023-10-27 | End: 2023-11-02 | Stop reason: HOSPADM

## 2023-10-27 RX ORDER — SODIUM CHLORIDE 9 MG/ML
INJECTION, SOLUTION INTRAVENOUS CONTINUOUS
Status: DISCONTINUED | OUTPATIENT
Start: 2023-10-27 | End: 2023-10-28

## 2023-10-27 RX ORDER — INSULIN LISPRO 100 [IU]/ML
0-4 INJECTION, SOLUTION INTRAVENOUS; SUBCUTANEOUS NIGHTLY
Status: DISCONTINUED | OUTPATIENT
Start: 2023-10-27 | End: 2023-11-02 | Stop reason: HOSPADM

## 2023-10-27 RX ORDER — ONDANSETRON 2 MG/ML
4 INJECTION INTRAMUSCULAR; INTRAVENOUS EVERY 6 HOURS PRN
Status: DISCONTINUED | OUTPATIENT
Start: 2023-10-27 | End: 2023-11-02 | Stop reason: HOSPADM

## 2023-10-27 RX ORDER — DEXTROSE MONOHYDRATE 100 MG/ML
INJECTION, SOLUTION INTRAVENOUS CONTINUOUS PRN
Status: DISCONTINUED | OUTPATIENT
Start: 2023-10-27 | End: 2023-11-02 | Stop reason: HOSPADM

## 2023-10-27 RX ORDER — POLYETHYLENE GLYCOL 3350 17 G/17G
17 POWDER, FOR SOLUTION ORAL DAILY PRN
Status: DISCONTINUED | OUTPATIENT
Start: 2023-10-27 | End: 2023-11-02 | Stop reason: HOSPADM

## 2023-10-27 RX ORDER — POTASSIUM CHLORIDE 7.45 MG/ML
10 INJECTION INTRAVENOUS PRN
Status: DISCONTINUED | OUTPATIENT
Start: 2023-10-27 | End: 2023-11-02 | Stop reason: HOSPADM

## 2023-10-27 RX ORDER — ONDANSETRON 2 MG/ML
4 INJECTION INTRAMUSCULAR; INTRAVENOUS ONCE
Status: COMPLETED | OUTPATIENT
Start: 2023-10-27 | End: 2023-10-27

## 2023-10-27 RX ORDER — 0.9 % SODIUM CHLORIDE 0.9 %
1000 INTRAVENOUS SOLUTION INTRAVENOUS ONCE
Status: COMPLETED | OUTPATIENT
Start: 2023-10-27 | End: 2023-10-27

## 2023-10-27 RX ORDER — SODIUM CHLORIDE 9 MG/ML
INJECTION, SOLUTION INTRAVENOUS PRN
Status: DISCONTINUED | OUTPATIENT
Start: 2023-10-27 | End: 2023-11-02 | Stop reason: HOSPADM

## 2023-10-27 RX ORDER — CHOLECALCIFEROL (VITAMIN D3) 125 MCG
5 CAPSULE ORAL NIGHTLY PRN
Status: DISCONTINUED | OUTPATIENT
Start: 2023-10-27 | End: 2023-11-02 | Stop reason: HOSPADM

## 2023-10-27 RX ORDER — POTASSIUM CHLORIDE 20 MEQ/1
40 TABLET, EXTENDED RELEASE ORAL PRN
Status: DISCONTINUED | OUTPATIENT
Start: 2023-10-27 | End: 2023-11-02 | Stop reason: HOSPADM

## 2023-10-27 RX ORDER — ACETAMINOPHEN 325 MG/1
650 TABLET ORAL EVERY 6 HOURS PRN
Status: DISCONTINUED | OUTPATIENT
Start: 2023-10-27 | End: 2023-11-02 | Stop reason: HOSPADM

## 2023-10-27 RX ORDER — GLIPIZIDE 5 MG/1
5 TABLET ORAL
Status: DISCONTINUED | OUTPATIENT
Start: 2023-10-27 | End: 2023-11-02 | Stop reason: HOSPADM

## 2023-10-27 RX ORDER — SODIUM CHLORIDE 0.9 % (FLUSH) 0.9 %
10 SYRINGE (ML) INJECTION EVERY 12 HOURS SCHEDULED
Status: DISCONTINUED | OUTPATIENT
Start: 2023-10-27 | End: 2023-11-02 | Stop reason: HOSPADM

## 2023-10-27 RX ADMIN — SODIUM CHLORIDE 1000 ML: 9 INJECTION, SOLUTION INTRAVENOUS at 11:08

## 2023-10-27 RX ADMIN — METOPROLOL TARTRATE 25 MG: 25 TABLET, FILM COATED ORAL at 23:20

## 2023-10-27 RX ADMIN — APIXABAN 5 MG: 5 TABLET, FILM COATED ORAL at 22:21

## 2023-10-27 RX ADMIN — SODIUM CHLORIDE 1000 ML: 9 INJECTION, SOLUTION INTRAVENOUS at 12:58

## 2023-10-27 RX ADMIN — SODIUM CHLORIDE 1000 ML: 9 INJECTION, SOLUTION INTRAVENOUS at 09:59

## 2023-10-27 RX ADMIN — Medication 5 MG: at 23:41

## 2023-10-27 RX ADMIN — METFORMIN HYDROCHLORIDE 1000 MG: 500 TABLET ORAL at 22:21

## 2023-10-27 RX ADMIN — FAMOTIDINE 20 MG: 20 TABLET ORAL at 22:21

## 2023-10-27 RX ADMIN — ONDANSETRON 4 MG: 2 INJECTION INTRAMUSCULAR; INTRAVENOUS at 13:00

## 2023-10-27 RX ADMIN — GLUCAGON HYDROCHLORIDE 2 MG: KIT at 12:59

## 2023-10-27 RX ADMIN — SODIUM CHLORIDE: 9 INJECTION, SOLUTION INTRAVENOUS at 17:40

## 2023-10-27 ASSESSMENT — PAIN - FUNCTIONAL ASSESSMENT: PAIN_FUNCTIONAL_ASSESSMENT: NONE - DENIES PAIN

## 2023-10-27 NOTE — ED NOTES
Patient is unable to provide urine sample at this time, urinal given to patient for convenience.       Monique Mcgraht RN  10/27/23 [id number]

## 2023-10-28 PROBLEM — L03.115 CELLULITIS OF RIGHT LOWER EXTREMITY: Status: ACTIVE | Noted: 2023-10-28

## 2023-10-28 LAB
ALBUMIN SERPL-MCNC: 3.3 G/DL (ref 3.5–5.2)
ALBUMIN/GLOB SERPL: 0.9 {RATIO} (ref 1–2.5)
ALP SERPL-CCNC: 63 U/L (ref 40–129)
ALT SERPL-CCNC: 24 U/L (ref 5–41)
ANION GAP SERPL CALCULATED.3IONS-SCNC: 15 MMOL/L (ref 9–17)
AST SERPL-CCNC: 23 U/L
BASOPHILS # BLD: 0 K/UL (ref 0–0.2)
BASOPHILS NFR BLD: 0 % (ref 0–2)
BILIRUB SERPL-MCNC: 0.3 MG/DL (ref 0.3–1.2)
BUN SERPL-MCNC: 19 MG/DL (ref 6–20)
BUN/CREAT SERPL: 24 (ref 9–20)
CALCIUM SERPL-MCNC: 8.5 MG/DL (ref 8.6–10.4)
CHLORIDE SERPL-SCNC: 94 MMOL/L (ref 98–107)
CO2 SERPL-SCNC: 22 MMOL/L (ref 20–31)
CREAT SERPL-MCNC: 0.8 MG/DL (ref 0.7–1.2)
ECHO BSA: 2.63 M2
EOSINOPHIL # BLD: 0 K/UL (ref 0–0.44)
EOSINOPHILS RELATIVE PERCENT: 0 % (ref 1–4)
ERYTHROCYTE [DISTWIDTH] IN BLOOD BY AUTOMATED COUNT: 13.1 % (ref 11.8–14.4)
GFR SERPL CREATININE-BSD FRML MDRD: >60 ML/MIN/1.73M2
GLUCOSE BLD-MCNC: 117 MG/DL (ref 74–100)
GLUCOSE BLD-MCNC: 125 MG/DL (ref 74–100)
GLUCOSE BLD-MCNC: 126 MG/DL (ref 74–100)
GLUCOSE BLD-MCNC: 144 MG/DL (ref 74–100)
GLUCOSE SERPL-MCNC: 108 MG/DL (ref 70–99)
HCT VFR BLD AUTO: 39.9 % (ref 40.7–50.3)
HGB BLD-MCNC: 13.2 G/DL (ref 13–17)
IMM GRANULOCYTES # BLD AUTO: 0.08 K/UL (ref 0–0.3)
IMM GRANULOCYTES NFR BLD: 1 %
LYMPHOCYTES NFR BLD: 0.83 K/UL (ref 1.1–3.7)
LYMPHOCYTES RELATIVE PERCENT: 10 % (ref 24–43)
MCH RBC QN AUTO: 32.7 PG (ref 25.2–33.5)
MCHC RBC AUTO-ENTMCNC: 33.1 G/DL (ref 28.4–34.8)
MCV RBC AUTO: 98.8 FL (ref 82.6–102.9)
MONOCYTES NFR BLD: 0.75 K/UL (ref 0.1–1.2)
MONOCYTES NFR BLD: 9 % (ref 3–12)
MORPHOLOGY: ABNORMAL
NEUTROPHILS NFR BLD: 80 % (ref 36–65)
NEUTS SEG NFR BLD: 6.64 K/UL (ref 1.5–8.1)
NRBC BLD-RTO: 0 PER 100 WBC
PLATELET # BLD AUTO: ABNORMAL K/UL (ref 138–453)
PLATELET, FLUORESCENCE: 120 K/UL (ref 138–453)
PLATELETS.RETICULATED NFR BLD AUTO: 7.2 % (ref 1.1–10.3)
POTASSIUM SERPL-SCNC: 4.4 MMOL/L (ref 3.7–5.3)
PROT SERPL-MCNC: 7 G/DL (ref 6.4–8.3)
RBC # BLD AUTO: 4.04 M/UL (ref 4.21–5.77)
SODIUM SERPL-SCNC: 131 MMOL/L (ref 135–144)
WBC OTHER # BLD: 8.3 K/UL (ref 3.5–11.3)

## 2023-10-28 PROCEDURE — 36415 COLL VENOUS BLD VENIPUNCTURE: CPT

## 2023-10-28 PROCEDURE — 2580000003 HC RX 258: Performed by: NURSE PRACTITIONER

## 2023-10-28 PROCEDURE — 6370000000 HC RX 637 (ALT 250 FOR IP): Performed by: INTERNAL MEDICINE

## 2023-10-28 PROCEDURE — 94761 N-INVAS EAR/PLS OXIMETRY MLT: CPT

## 2023-10-28 PROCEDURE — 2580000003 HC RX 258: Performed by: INTERNAL MEDICINE

## 2023-10-28 PROCEDURE — 85025 COMPLETE CBC W/AUTO DIFF WBC: CPT

## 2023-10-28 PROCEDURE — 6360000002 HC RX W HCPCS: Performed by: INTERNAL MEDICINE

## 2023-10-28 PROCEDURE — 80053 COMPREHEN METABOLIC PANEL: CPT

## 2023-10-28 PROCEDURE — 82947 ASSAY GLUCOSE BLOOD QUANT: CPT

## 2023-10-28 PROCEDURE — 1200000000 HC SEMI PRIVATE

## 2023-10-28 PROCEDURE — 6370000000 HC RX 637 (ALT 250 FOR IP): Performed by: NURSE PRACTITIONER

## 2023-10-28 RX ORDER — DILTIAZEM HYDROCHLORIDE 60 MG/1
120 CAPSULE, EXTENDED RELEASE ORAL 2 TIMES DAILY
Status: DISCONTINUED | OUTPATIENT
Start: 2023-10-28 | End: 2023-11-01

## 2023-10-28 RX ORDER — METOPROLOL TARTRATE 50 MG/1
50 TABLET, FILM COATED ORAL ONCE
Status: COMPLETED | OUTPATIENT
Start: 2023-10-28 | End: 2023-10-28

## 2023-10-28 RX ADMIN — METFORMIN HYDROCHLORIDE 1000 MG: 500 TABLET ORAL at 07:33

## 2023-10-28 RX ADMIN — ATORVASTATIN CALCIUM 40 MG: 40 TABLET, FILM COATED ORAL at 07:33

## 2023-10-28 RX ADMIN — DILTIAZEM HYDROCHLORIDE 120 MG: 60 CAPSULE, EXTENDED RELEASE ORAL at 22:38

## 2023-10-28 RX ADMIN — SODIUM CHLORIDE, PRESERVATIVE FREE 10 ML: 5 INJECTION INTRAVENOUS at 20:48

## 2023-10-28 RX ADMIN — APIXABAN 5 MG: 5 TABLET, FILM COATED ORAL at 20:48

## 2023-10-28 RX ADMIN — FAMOTIDINE 20 MG: 20 TABLET ORAL at 20:48

## 2023-10-28 RX ADMIN — SODIUM CHLORIDE: 9 INJECTION, SOLUTION INTRAVENOUS at 00:26

## 2023-10-28 RX ADMIN — SODIUM CHLORIDE, PRESERVATIVE FREE 10 ML: 5 INJECTION INTRAVENOUS at 16:28

## 2023-10-28 RX ADMIN — METOPROLOL TARTRATE 25 MG: 25 TABLET, FILM COATED ORAL at 20:48

## 2023-10-28 RX ADMIN — METOPROLOL TARTRATE 25 MG: 25 TABLET, FILM COATED ORAL at 07:33

## 2023-10-28 RX ADMIN — METFORMIN HYDROCHLORIDE 1000 MG: 500 TABLET ORAL at 20:48

## 2023-10-28 RX ADMIN — METOPROLOL TARTRATE 50 MG: 50 TABLET, FILM COATED ORAL at 17:29

## 2023-10-28 RX ADMIN — PIPERACILLIN AND TAZOBACTAM 3375 MG: 3; .375 INJECTION, POWDER, LYOPHILIZED, FOR SOLUTION INTRAVENOUS at 09:12

## 2023-10-28 RX ADMIN — METOPROLOL TARTRATE 25 MG: 25 TABLET, FILM COATED ORAL at 13:26

## 2023-10-28 RX ADMIN — PIPERACILLIN AND TAZOBACTAM 3375 MG: 3; .375 INJECTION, POWDER, LYOPHILIZED, FOR SOLUTION INTRAVENOUS at 16:28

## 2023-10-28 RX ADMIN — FAMOTIDINE 20 MG: 20 TABLET ORAL at 07:33

## 2023-10-28 RX ADMIN — GLIPIZIDE 5 MG: 5 TABLET ORAL at 16:30

## 2023-10-28 RX ADMIN — ACETAMINOPHEN 650 MG: 325 TABLET ORAL at 17:29

## 2023-10-28 RX ADMIN — Medication 5 MG: at 23:10

## 2023-10-28 RX ADMIN — APIXABAN 5 MG: 5 TABLET, FILM COATED ORAL at 07:33

## 2023-10-28 RX ADMIN — SODIUM CHLORIDE 25 ML/HR: 9 INJECTION, SOLUTION INTRAVENOUS at 16:27

## 2023-10-28 RX ADMIN — ASPIRIN 325 MG: 325 TABLET, COATED ORAL at 07:33

## 2023-10-28 RX ADMIN — GLIPIZIDE 5 MG: 5 TABLET ORAL at 07:32

## 2023-10-28 ASSESSMENT — PAIN DESCRIPTION - LOCATION
LOCATION: LEG

## 2023-10-28 ASSESSMENT — PAIN DESCRIPTION - DESCRIPTORS
DESCRIPTORS: DISCOMFORT
DESCRIPTORS: THROBBING;ACHING;DISCOMFORT
DESCRIPTORS: DISCOMFORT

## 2023-10-28 ASSESSMENT — PAIN SCALES - GENERAL
PAINLEVEL_OUTOF10: 3
PAINLEVEL_OUTOF10: 4

## 2023-10-28 ASSESSMENT — PAIN - FUNCTIONAL ASSESSMENT
PAIN_FUNCTIONAL_ASSESSMENT: ACTIVITIES ARE NOT PREVENTED
PAIN_FUNCTIONAL_ASSESSMENT: ACTIVITIES ARE NOT PREVENTED

## 2023-10-28 ASSESSMENT — PAIN DESCRIPTION - ORIENTATION
ORIENTATION: RIGHT

## 2023-10-28 NOTE — PLAN OF CARE
Problem: Discharge Planning  Goal: Discharge to home or other facility with appropriate resources  10/28/2023 0850 by Tammy Lr RN  Outcome: Progressing  10/28/2023 0215 by Jesse Navas RN  Outcome: Progressing  Flowsheets (Taken 10/28/2023 0215)  Discharge to home or other facility with appropriate resources:   Identify barriers to discharge with patient and caregiver   Identify discharge learning needs (meds, wound care, etc)   Refer to discharge planning if patient needs post-hospital services based on physician order or complex needs related to functional status, cognitive ability or social support system   Arrange for needed discharge resources and transportation as appropriate     Problem: Safety - Adult  Goal: Free from fall injury  10/28/2023 0850 by Tammy Lr RN  Outcome: Progressing  10/28/2023 0215 by Jesse Navas RN  Outcome: Progressing  Flowsheets (Taken 10/28/2023 0215)  Free From Fall Injury: Instruct family/caregiver on patient safety     Problem: Pain  Goal: Verbalizes/displays adequate comfort level or baseline comfort level  Outcome: Progressing

## 2023-10-28 NOTE — H&P
Cooper Castellano M.D. Internal Medicine History and Phyisical    Patient: Oliver Talavera  Date of Admission: 10/27/2023  9:19 AM  Date of Evaluation: 10/28/2023      Patient:  Oliver Talavera  MRN: [id number]    Chief Complaint:    Chief Complaint   Patient presents with    Blurred Vision    Tinnitus    Hypotension       History Obtained From:  patient, electronic medical record    PCP: KILLIAN Henry CNP    History of Present Illness: The patient is a 48 y.o. male who presents with not feeling very well and concerned about being lightheaded. He complains of a little bit of achiness and has had what sounds like maybe a flulike illness over the last couple days. Today's biggest complaint is pain in his right leg. Past Medical History:        Diagnosis Date    History of cardiac cath 09/05/2019    [facility]) [place]/Dr. Kimbrough/Right Radial     History of cardioversion 09/19/2019    Dr Milan Maurice    Idiopathic chronic gout of multiple sites without tophus     Obesity, unspecified     PAF (paroxysmal atrial fibrillation) ([address])     Dr. Fern Pagan, [facility]    Sleep apnea     non compliant cpap    SVT (supraventricular tachycardia) 10/13/2023    Type II or unspecified type diabetes mellitus without mention of complication, not stated as uncontrolled     Unspecified essential hypertension        Past Surgical History:        Procedure Laterality Date    ANTERIOR CRUCIATE LIGAMENT REPAIR  2009    CARDIOVERSION  09/06/2019    Dr Ankit Barker/200 joules then 250 joules- unsuccessful    TRANSESOPHAGEAL ECHOCARDIOGRAM  09/06/2019    Dr Hansen Deem cardioversion       Family History:       Problem Relation Age of Onset    Atrial Fibrillation Mother     Heart Attack Father     Cancer Father     Atrial Fibrillation Brother     No Known Problems Brother     No Known Problems Brother        Social History:   TOBACCO:   reports that he has never smoked.  His smokeless tobacco use

## 2023-10-28 NOTE — PLAN OF CARE
Problem: Discharge Planning  Goal: Discharge to home or other facility with appropriate resources  Outcome: Progressing  Flowsheets (Taken 10/28/2023 0215)  Discharge to home or other facility with appropriate resources:   Identify barriers to discharge with patient and caregiver   Identify discharge learning needs (meds, wound care, etc)   Refer to discharge planning if patient needs post-hospital services based on physician order or complex needs related to functional status, cognitive ability or social support system   Arrange for needed discharge resources and transportation as appropriate     Problem: Safety - Adult  Goal: Free from fall injury  Outcome: Progressing  Flowsheets (Taken 10/28/2023 0215)  Free From Fall Injury: Instruct family/caregiver on patient safety

## 2023-10-29 LAB
ALBUMIN SERPL-MCNC: 3.5 G/DL (ref 3.5–5.2)
ALBUMIN/GLOB SERPL: 0.8 {RATIO} (ref 1–2.5)
ALP SERPL-CCNC: 88 U/L (ref 40–129)
ALT SERPL-CCNC: 36 U/L (ref 5–41)
ANION GAP SERPL CALCULATED.3IONS-SCNC: 14 MMOL/L (ref 9–17)
AST SERPL-CCNC: 33 U/L
BASOPHILS # BLD: 0.08 K/UL (ref 0–0.2)
BASOPHILS NFR BLD: 1 % (ref 0–2)
BILIRUB SERPL-MCNC: 0.5 MG/DL (ref 0.3–1.2)
BUN SERPL-MCNC: 13 MG/DL (ref 6–20)
BUN/CREAT SERPL: 19 (ref 9–20)
CALCIUM SERPL-MCNC: 9 MG/DL (ref 8.6–10.4)
CHLORIDE SERPL-SCNC: 94 MMOL/L (ref 98–107)
CO2 SERPL-SCNC: 20 MMOL/L (ref 20–31)
CREAT SERPL-MCNC: 0.7 MG/DL (ref 0.7–1.2)
EOSINOPHIL # BLD: 0.06 K/UL (ref 0–0.44)
EOSINOPHILS RELATIVE PERCENT: 1 % (ref 1–4)
ERYTHROCYTE [DISTWIDTH] IN BLOOD BY AUTOMATED COUNT: 13.3 % (ref 11.8–14.4)
GFR SERPL CREATININE-BSD FRML MDRD: >60 ML/MIN/1.73M2
GLUCOSE BLD-MCNC: 142 MG/DL (ref 74–100)
GLUCOSE BLD-MCNC: 152 MG/DL (ref 74–100)
GLUCOSE BLD-MCNC: 159 MG/DL (ref 74–100)
GLUCOSE BLD-MCNC: 209 MG/DL (ref 74–100)
GLUCOSE SERPL-MCNC: 144 MG/DL (ref 70–99)
HCT VFR BLD AUTO: 42.1 % (ref 40.7–50.3)
HGB BLD-MCNC: 13.8 G/DL (ref 13–17)
IMM GRANULOCYTES # BLD AUTO: 0.09 K/UL (ref 0–0.3)
IMM GRANULOCYTES NFR BLD: 1 %
LYMPHOCYTES NFR BLD: 1.04 K/UL (ref 1.1–3.7)
LYMPHOCYTES RELATIVE PERCENT: 10 % (ref 24–43)
MCH RBC QN AUTO: 32.3 PG (ref 25.2–33.5)
MCHC RBC AUTO-ENTMCNC: 32.8 G/DL (ref 28.4–34.8)
MCV RBC AUTO: 98.6 FL (ref 82.6–102.9)
MONOCYTES NFR BLD: 1.08 K/UL (ref 0.1–1.2)
MONOCYTES NFR BLD: 11 % (ref 3–12)
NEUTROPHILS NFR BLD: 76 % (ref 36–65)
NEUTS SEG NFR BLD: 7.95 K/UL (ref 1.5–8.1)
NRBC BLD-RTO: 0 PER 100 WBC
PLATELET # BLD AUTO: 150 K/UL (ref 138–453)
PMV BLD AUTO: 11.1 FL (ref 8.1–13.5)
POTASSIUM SERPL-SCNC: 4.4 MMOL/L (ref 3.7–5.3)
PROT SERPL-MCNC: 7.7 G/DL (ref 6.4–8.3)
RBC # BLD AUTO: 4.27 M/UL (ref 4.21–5.77)
SODIUM SERPL-SCNC: 128 MMOL/L (ref 135–144)
WBC OTHER # BLD: 10.3 K/UL (ref 3.5–11.3)

## 2023-10-29 PROCEDURE — 85025 COMPLETE CBC W/AUTO DIFF WBC: CPT

## 2023-10-29 PROCEDURE — 94761 N-INVAS EAR/PLS OXIMETRY MLT: CPT

## 2023-10-29 PROCEDURE — 82947 ASSAY GLUCOSE BLOOD QUANT: CPT

## 2023-10-29 PROCEDURE — 6370000000 HC RX 637 (ALT 250 FOR IP): Performed by: INTERNAL MEDICINE

## 2023-10-29 PROCEDURE — 36415 COLL VENOUS BLD VENIPUNCTURE: CPT

## 2023-10-29 PROCEDURE — 80053 COMPREHEN METABOLIC PANEL: CPT

## 2023-10-29 PROCEDURE — 6370000000 HC RX 637 (ALT 250 FOR IP): Performed by: NURSE PRACTITIONER

## 2023-10-29 PROCEDURE — 6360000002 HC RX W HCPCS: Performed by: INTERNAL MEDICINE

## 2023-10-29 PROCEDURE — 2580000003 HC RX 258: Performed by: INTERNAL MEDICINE

## 2023-10-29 PROCEDURE — 2580000003 HC RX 258: Performed by: NURSE PRACTITIONER

## 2023-10-29 PROCEDURE — 1200000000 HC SEMI PRIVATE

## 2023-10-29 RX ORDER — METOPROLOL TARTRATE 100 MG/1
100 TABLET ORAL 2 TIMES DAILY
Status: DISCONTINUED | OUTPATIENT
Start: 2023-10-29 | End: 2023-11-02 | Stop reason: HOSPADM

## 2023-10-29 RX ADMIN — METOPROLOL 100 MG: 100 TABLET ORAL at 09:26

## 2023-10-29 RX ADMIN — DILTIAZEM HYDROCHLORIDE 120 MG: 60 CAPSULE, EXTENDED RELEASE ORAL at 09:26

## 2023-10-29 RX ADMIN — PIPERACILLIN AND TAZOBACTAM 3375 MG: 3; .375 INJECTION, POWDER, LYOPHILIZED, FOR SOLUTION INTRAVENOUS at 09:38

## 2023-10-29 RX ADMIN — ATORVASTATIN CALCIUM 40 MG: 40 TABLET, FILM COATED ORAL at 09:26

## 2023-10-29 RX ADMIN — PIPERACILLIN AND TAZOBACTAM 3375 MG: 3; .375 INJECTION, POWDER, LYOPHILIZED, FOR SOLUTION INTRAVENOUS at 16:53

## 2023-10-29 RX ADMIN — METFORMIN HYDROCHLORIDE 1000 MG: 500 TABLET ORAL at 20:52

## 2023-10-29 RX ADMIN — METOPROLOL TARTRATE 25 MG: 25 TABLET, FILM COATED ORAL at 01:37

## 2023-10-29 RX ADMIN — APIXABAN 5 MG: 5 TABLET, FILM COATED ORAL at 20:53

## 2023-10-29 RX ADMIN — GLIPIZIDE 5 MG: 5 TABLET ORAL at 16:31

## 2023-10-29 RX ADMIN — FAMOTIDINE 20 MG: 20 TABLET ORAL at 09:26

## 2023-10-29 RX ADMIN — GLIPIZIDE 5 MG: 5 TABLET ORAL at 07:18

## 2023-10-29 RX ADMIN — APIXABAN 5 MG: 5 TABLET, FILM COATED ORAL at 09:26

## 2023-10-29 RX ADMIN — PIPERACILLIN AND TAZOBACTAM 3375 MG: 3; .375 INJECTION, POWDER, LYOPHILIZED, FOR SOLUTION INTRAVENOUS at 00:18

## 2023-10-29 RX ADMIN — METFORMIN HYDROCHLORIDE 1000 MG: 500 TABLET ORAL at 07:18

## 2023-10-29 RX ADMIN — METOPROLOL 100 MG: 100 TABLET ORAL at 20:52

## 2023-10-29 RX ADMIN — DILTIAZEM HYDROCHLORIDE 120 MG: 60 CAPSULE, EXTENDED RELEASE ORAL at 20:53

## 2023-10-29 RX ADMIN — ASPIRIN 325 MG: 325 TABLET, COATED ORAL at 09:25

## 2023-10-29 RX ADMIN — SODIUM CHLORIDE, PRESERVATIVE FREE 10 ML: 5 INJECTION INTRAVENOUS at 09:38

## 2023-10-29 RX ADMIN — SODIUM CHLORIDE, PRESERVATIVE FREE 10 ML: 5 INJECTION INTRAVENOUS at 20:55

## 2023-10-29 RX ADMIN — FAMOTIDINE 20 MG: 20 TABLET ORAL at 20:53

## 2023-10-29 ASSESSMENT — PAIN SCALES - GENERAL: PAINLEVEL_OUTOF10: 3

## 2023-10-29 ASSESSMENT — PAIN DESCRIPTION - ONSET: ONSET: ON-GOING

## 2023-10-29 ASSESSMENT — PAIN DESCRIPTION - LOCATION: LOCATION: LEG

## 2023-10-29 ASSESSMENT — PAIN DESCRIPTION - PAIN TYPE: TYPE: ACUTE PAIN

## 2023-10-29 ASSESSMENT — PAIN DESCRIPTION - DESCRIPTORS: DESCRIPTORS: DISCOMFORT

## 2023-10-29 ASSESSMENT — PAIN DESCRIPTION - ORIENTATION: ORIENTATION: RIGHT

## 2023-10-29 ASSESSMENT — PAIN - FUNCTIONAL ASSESSMENT: PAIN_FUNCTIONAL_ASSESSMENT: ACTIVITIES ARE NOT PREVENTED

## 2023-10-29 ASSESSMENT — PAIN DESCRIPTION - FREQUENCY: FREQUENCY: CONTINUOUS

## 2023-10-29 NOTE — PLAN OF CARE
Problem: Discharge Planning  Goal: Discharge to home or other facility with appropriate resources  Outcome: Progressing  Flowsheets (Taken 10/28/2023 2036 by Kiran Swain RN)  Discharge to home or other facility with appropriate resources:   Identify barriers to discharge with patient and caregiver   Arrange for needed discharge resources and transportation as appropriate   Identify discharge learning needs (meds, wound care, etc)   Refer to discharge planning if patient needs post-hospital services based on physician order or complex needs related to functional status, cognitive ability or social support system     Problem: Safety - Adult  Goal: Free from fall injury  Outcome: Progressing     Problem: Pain  Goal: Verbalizes/displays adequate comfort level or baseline comfort level  Outcome: Progressing

## 2023-10-30 LAB
ALBUMIN SERPL-MCNC: 3.3 G/DL (ref 3.5–5.2)
ALBUMIN/GLOB SERPL: 0.8 {RATIO} (ref 1–2.5)
ALP SERPL-CCNC: 84 U/L (ref 40–129)
ALT SERPL-CCNC: 61 U/L (ref 5–41)
ANION GAP SERPL CALCULATED.3IONS-SCNC: 10 MMOL/L (ref 9–17)
ASO AB SERPL-ACNC: 73.7 IU/ML (ref 0–200)
AST SERPL-CCNC: 49 U/L
BASOPHILS # BLD: <0.03 K/UL (ref 0–0.2)
BASOPHILS NFR BLD: 0 % (ref 0–2)
BILIRUB SERPL-MCNC: 0.6 MG/DL (ref 0.3–1.2)
BUN SERPL-MCNC: 10 MG/DL (ref 6–20)
BUN/CREAT SERPL: 17 (ref 9–20)
CALCIUM SERPL-MCNC: 9 MG/DL (ref 8.6–10.4)
CHLORIDE SERPL-SCNC: 96 MMOL/L (ref 98–107)
CO2 SERPL-SCNC: 26 MMOL/L (ref 20–31)
CREAT SERPL-MCNC: 0.6 MG/DL (ref 0.7–1.2)
EOSINOPHIL # BLD: 0.06 K/UL (ref 0–0.44)
EOSINOPHILS RELATIVE PERCENT: 1 % (ref 1–4)
ERYTHROCYTE [DISTWIDTH] IN BLOOD BY AUTOMATED COUNT: 13.3 % (ref 11.8–14.4)
GFR SERPL CREATININE-BSD FRML MDRD: >60 ML/MIN/1.73M2
GLUCOSE BLD-MCNC: 138 MG/DL (ref 74–100)
GLUCOSE BLD-MCNC: 141 MG/DL (ref 74–100)
GLUCOSE BLD-MCNC: 151 MG/DL (ref 74–100)
GLUCOSE BLD-MCNC: 196 MG/DL (ref 74–100)
GLUCOSE SERPL-MCNC: 147 MG/DL (ref 70–99)
HCT VFR BLD AUTO: 41.2 % (ref 40.7–50.3)
HGB BLD-MCNC: 13.8 G/DL (ref 13–17)
IMM GRANULOCYTES # BLD AUTO: 0.08 K/UL (ref 0–0.3)
IMM GRANULOCYTES NFR BLD: 1 %
LYMPHOCYTES NFR BLD: 1.03 K/UL (ref 1.1–3.7)
LYMPHOCYTES RELATIVE PERCENT: 13 % (ref 24–43)
MCH RBC QN AUTO: 33.4 PG (ref 25.2–33.5)
MCHC RBC AUTO-ENTMCNC: 33.5 G/DL (ref 28.4–34.8)
MCV RBC AUTO: 99.8 FL (ref 82.6–102.9)
MONOCYTES NFR BLD: 1.11 K/UL (ref 0.1–1.2)
MONOCYTES NFR BLD: 14 % (ref 3–12)
NEUTROPHILS NFR BLD: 71 % (ref 36–65)
NEUTS SEG NFR BLD: 5.93 K/UL (ref 1.5–8.1)
NRBC BLD-RTO: 0 PER 100 WBC
PLATELET # BLD AUTO: 149 K/UL (ref 138–453)
PMV BLD AUTO: 11.1 FL (ref 8.1–13.5)
POTASSIUM SERPL-SCNC: 4.6 MMOL/L (ref 3.7–5.3)
PROT SERPL-MCNC: 7.5 G/DL (ref 6.4–8.3)
RBC # BLD AUTO: 4.13 M/UL (ref 4.21–5.77)
SODIUM SERPL-SCNC: 132 MMOL/L (ref 135–144)
WBC OTHER # BLD: 8.2 K/UL (ref 3.5–11.3)

## 2023-10-30 PROCEDURE — 85025 COMPLETE CBC W/AUTO DIFF WBC: CPT

## 2023-10-30 PROCEDURE — 2580000003 HC RX 258: Performed by: NURSE PRACTITIONER

## 2023-10-30 PROCEDURE — 2580000003 HC RX 258: Performed by: INTERNAL MEDICINE

## 2023-10-30 PROCEDURE — 6370000000 HC RX 637 (ALT 250 FOR IP): Performed by: INTERNAL MEDICINE

## 2023-10-30 PROCEDURE — 6360000002 HC RX W HCPCS: Performed by: INTERNAL MEDICINE

## 2023-10-30 PROCEDURE — 6370000000 HC RX 637 (ALT 250 FOR IP): Performed by: NURSE PRACTITIONER

## 2023-10-30 PROCEDURE — 36415 COLL VENOUS BLD VENIPUNCTURE: CPT

## 2023-10-30 PROCEDURE — 1200000000 HC SEMI PRIVATE

## 2023-10-30 PROCEDURE — 6360000002 HC RX W HCPCS: Performed by: NURSE PRACTITIONER

## 2023-10-30 PROCEDURE — 86063 ANTISTREPTOLYSIN O SCREEN: CPT

## 2023-10-30 PROCEDURE — 94761 N-INVAS EAR/PLS OXIMETRY MLT: CPT

## 2023-10-30 PROCEDURE — 80053 COMPREHEN METABOLIC PANEL: CPT

## 2023-10-30 PROCEDURE — 82947 ASSAY GLUCOSE BLOOD QUANT: CPT

## 2023-10-30 RX ORDER — DIGOXIN 250 MCG
250 TABLET ORAL DAILY
Status: DISCONTINUED | OUTPATIENT
Start: 2023-10-30 | End: 2023-11-02 | Stop reason: HOSPADM

## 2023-10-30 RX ORDER — TRAMADOL HYDROCHLORIDE 50 MG/1
50 TABLET ORAL EVERY 6 HOURS PRN
Status: DISCONTINUED | OUTPATIENT
Start: 2023-10-30 | End: 2023-10-31

## 2023-10-30 RX ADMIN — SODIUM CHLORIDE, PRESERVATIVE FREE 10 ML: 5 INJECTION INTRAVENOUS at 20:14

## 2023-10-30 RX ADMIN — GLIPIZIDE 5 MG: 5 TABLET ORAL at 07:41

## 2023-10-30 RX ADMIN — DILTIAZEM HYDROCHLORIDE 120 MG: 60 CAPSULE, EXTENDED RELEASE ORAL at 08:52

## 2023-10-30 RX ADMIN — DILTIAZEM HYDROCHLORIDE 120 MG: 60 CAPSULE, EXTENDED RELEASE ORAL at 20:08

## 2023-10-30 RX ADMIN — TRAMADOL HYDROCHLORIDE 50 MG: 50 TABLET, COATED ORAL at 17:17

## 2023-10-30 RX ADMIN — SODIUM CHLORIDE, PRESERVATIVE FREE 10 ML: 5 INJECTION INTRAVENOUS at 08:52

## 2023-10-30 RX ADMIN — ATORVASTATIN CALCIUM 40 MG: 40 TABLET, FILM COATED ORAL at 08:52

## 2023-10-30 RX ADMIN — APIXABAN 5 MG: 5 TABLET, FILM COATED ORAL at 08:52

## 2023-10-30 RX ADMIN — TRAMADOL HYDROCHLORIDE 50 MG: 50 TABLET, COATED ORAL at 22:47

## 2023-10-30 RX ADMIN — TRAMADOL HYDROCHLORIDE 50 MG: 50 TABLET, COATED ORAL at 07:41

## 2023-10-30 RX ADMIN — APIXABAN 5 MG: 5 TABLET, FILM COATED ORAL at 20:09

## 2023-10-30 RX ADMIN — METOPROLOL 100 MG: 100 TABLET ORAL at 08:52

## 2023-10-30 RX ADMIN — METOPROLOL 100 MG: 100 TABLET ORAL at 20:08

## 2023-10-30 RX ADMIN — CEFTRIAXONE SODIUM 2000 MG: 2 INJECTION, POWDER, FOR SOLUTION INTRAMUSCULAR; INTRAVENOUS at 12:40

## 2023-10-30 RX ADMIN — FAMOTIDINE 20 MG: 20 TABLET ORAL at 20:10

## 2023-10-30 RX ADMIN — METFORMIN HYDROCHLORIDE 1000 MG: 500 TABLET ORAL at 07:41

## 2023-10-30 RX ADMIN — VANCOMYCIN HYDROCHLORIDE 1250 MG: 500 INJECTION, POWDER, LYOPHILIZED, FOR SOLUTION INTRAVENOUS at 08:57

## 2023-10-30 RX ADMIN — DIGOXIN 250 MCG: 0.25 TABLET ORAL at 17:15

## 2023-10-30 RX ADMIN — ACETAMINOPHEN 650 MG: 325 TABLET ORAL at 20:07

## 2023-10-30 RX ADMIN — METFORMIN HYDROCHLORIDE 1000 MG: 500 TABLET ORAL at 20:09

## 2023-10-30 RX ADMIN — GLIPIZIDE 5 MG: 5 TABLET ORAL at 17:15

## 2023-10-30 RX ADMIN — PIPERACILLIN AND TAZOBACTAM 3375 MG: 3; .375 INJECTION, POWDER, LYOPHILIZED, FOR SOLUTION INTRAVENOUS at 01:03

## 2023-10-30 RX ADMIN — ASPIRIN 325 MG: 325 TABLET, COATED ORAL at 08:52

## 2023-10-30 RX ADMIN — SODIUM CHLORIDE: 9 INJECTION, SOLUTION INTRAVENOUS at 01:02

## 2023-10-30 RX ADMIN — FAMOTIDINE 20 MG: 20 TABLET ORAL at 08:52

## 2023-10-30 ASSESSMENT — PAIN DESCRIPTION - ORIENTATION
ORIENTATION: RIGHT
ORIENTATION: RIGHT

## 2023-10-30 ASSESSMENT — PAIN DESCRIPTION - FREQUENCY
FREQUENCY: CONTINUOUS
FREQUENCY: CONTINUOUS

## 2023-10-30 ASSESSMENT — PAIN DESCRIPTION - LOCATION
LOCATION: LEG
LOCATION: LEG

## 2023-10-30 ASSESSMENT — PAIN SCALES - GENERAL
PAINLEVEL_OUTOF10: 5
PAINLEVEL_OUTOF10: 3
PAINLEVEL_OUTOF10: 5
PAINLEVEL_OUTOF10: 4
PAINLEVEL_OUTOF10: 3
PAINLEVEL_OUTOF10: 5
PAINLEVEL_OUTOF10: 6

## 2023-10-30 ASSESSMENT — PAIN DESCRIPTION - DESCRIPTORS
DESCRIPTORS: DISCOMFORT;PRESSURE
DESCRIPTORS: DISCOMFORT;PRESSURE

## 2023-10-30 ASSESSMENT — PAIN DESCRIPTION - PAIN TYPE
TYPE: ACUTE PAIN
TYPE: ACUTE PAIN

## 2023-10-30 ASSESSMENT — PAIN - FUNCTIONAL ASSESSMENT
PAIN_FUNCTIONAL_ASSESSMENT: PREVENTS OR INTERFERES SOME ACTIVE ACTIVITIES AND ADLS
PAIN_FUNCTIONAL_ASSESSMENT: PREVENTS OR INTERFERES SOME ACTIVE ACTIVITIES AND ADLS

## 2023-10-30 ASSESSMENT — PAIN DESCRIPTION - ONSET
ONSET: ON-GOING
ONSET: ON-GOING

## 2023-10-30 NOTE — CONSULTS
Vancomycin Dosing by Pharmacy - Initial Note   TODAY'S DATE:  10/30/2023  Patient name, age:  Sim Bran, 48 y.o. Indication: SSTI, MRSA suspected. Additional antimicrobials:  Zosyn    Allergies:  Biaxin xl [clarithromycin] and Medrol [methylprednisolone]   Actual Weight:    Wt Readings from Last 1 Encounters:   10/30/23 (!) 138.8 kg (306 lb)     Labs/Ancillary Data  Estimated Creatinine Clearance: 207 mL/min (A) (based on SCr of 0.6 mg/dL (L)). Recent Labs     10/28/23  0600 10/29/23  0605 10/30/23  0600   CREATININE 0.8 0.7 0.6*   BUN 19 13 10   WBC 8.3 10.3 8.2     No results found for: \"PROCAL\"    Intake/Output Summary (Last 24 hours) at 10/30/2023 0748  Last data filed at 10/30/2023 0514  Gross per 24 hour   Intake 2246.6 ml   Output 1800 ml   Net 446.6 ml     Temp: 96.9 F    Recent vancomycin administrations        No vancomycin IV orders with administrations found. Orders not given:            vancomycin (VANCOCIN) 1,250 mg in sodium chloride 0.9 % 250 mL IVPB    vancomycin (VANCOCIN) intermittent dosing (placeholder)                  Culture Date / Source  /  Results  10/27/23         blood       no growth    MRSA Nasal Swab: N/A. Non-respiratory infection. Ashu Box PLAN   Vancomycin 1250 mg IVPB every 8 hours. Based on bayesian model dosing, predicted  mg/L.hr and predicted trough 13.3 mg/L. Ensured BUN/sCr ordered at baseline and every 48 hours x at least 3 levels, then at least weekly. Vancomycin level ordered for 11/1/23 @ 0600. Will use bayesian method for dosing. This level will not be a trough. Target AUC/ANJEL 400-500, with trough goal estimate of 10-15.       Vancomycin Target Concentration Parameters  Treatment  Population Target AUC/ANJEL Target Trough   Invasive MRSA Infection (bacteremia, pneumonia, meningitis, endocarditis, osteomyelitis)  Sepsis (undifferentiated) 400-600 N/A   Infection due to non-MRSA pathogen  Empiric treatment of non-invasive MRSA infection  (SSTI,

## 2023-10-30 NOTE — PLAN OF CARE
Problem: Discharge Planning  Goal: Discharge to home or other facility with appropriate resources  10/29/2023 2329 by Ulisses Ibarra RN  Outcome: Progressing  Flowsheets (Taken 10/28/2023 2036 by Margo Pisano, RN)  Discharge to home or other facility with appropriate resources:   Identify barriers to discharge with patient and caregiver   Arrange for needed discharge resources and transportation as appropriate   Identify discharge learning needs (meds, wound care, etc)   Refer to discharge planning if patient needs post-hospital services based on physician order or complex needs related to functional status, cognitive ability or social support system  10/29/2023 1034 by Milana Mathur RN  Outcome: Progressing  Flowsheets (Taken 10/28/2023 2036 by Margo Pisano RN)  Discharge to home or other facility with appropriate resources:   Identify barriers to discharge with patient and caregiver   Arrange for needed discharge resources and transportation as appropriate   Identify discharge learning needs (meds, wound care, etc)   Refer to discharge planning if patient needs post-hospital services based on physician order or complex needs related to functional status, cognitive ability or social support system     Problem: Safety - Adult  Goal: Free from fall injury  10/29/2023 2329 by Ulisses Ibarra RN  Outcome: Progressing  Flowsheets (Taken 10/28/2023 0215 by Margo Pisano RN)  Free From Fall Injury: Instruct family/caregiver on patient safety  10/29/2023 1034 by Milana Mathur RN  Outcome: Progressing     Problem: Pain  Goal: Verbalizes/displays adequate comfort level or baseline comfort level  10/29/2023 2329 by Ulisses Ibarra RN  Outcome: Progressing  Flowsheets (Taken 10/29/2023 2329)  Verbalizes/displays adequate comfort level or baseline comfort level:   Encourage patient to monitor pain and request assistance   Assess pain using appropriate pain scale   Administer analgesics based on

## 2023-10-30 NOTE — CARE COORDINATION
Case Management Assessment  Initial Evaluation    Date/Time of Evaluation: 10/30/2023 12:11 PM  Assessment Completed by: SANTIAGO Ho    If patient is discharged prior to next notation, then this note serves as note for discharge by case management. Patient Name: Beverly Crenshaw                   YOB: 1970  Diagnosis: Dehydration [E86.0]                   Date / Time: 10/27/2023  9:19 AM    Patient Admission Status: Inpatient   Readmission Risk (Low < 19, Mod (19-27), High > 27): Readmission Risk Score: 11.8    Current PCP: KILLIAN Taylor CNP  PCP verified by CM? Yes    Chart Reviewed: Yes      History Provided by: Patient  Patient Orientation: Place, Person, Alert and Oriented, Situation, Self    Patient Cognition: Alert    Hospitalization in the last 30 days (Readmission):  No    If yes, Readmission Assessment in CM Navigator will be completed. Advance Directives:      Code Status: Full Code   Patient's Primary Decision Maker is: Legal Next of Kin      Discharge Planning:    Patient lives with: Children Type of Home: House  Primary Care Giver: Self  Patient Support Systems include: Children, Parent, Family Members   Current Financial resources: Other (Comment) (Angel Luis BAILEY)  Current community resources: None  Current services prior to admission: C-pap            Current DME:              Type of Home Care services:  IV Therapy    ADLS  Prior functional level: Independent in ADLs/IADLs  Current functional level: Independent in ADLs/IADLs    PT AM-PAC:   /24  OT AM-PAC:   /24    Family can provide assistance at DC: Yes  Would you like Case Management to discuss the discharge plan with any other family members/significant others, and if so, who?     Plans to Return to Present Housing: Yes  Other Identified Issues/Barriers to RETURNING to current housing: none  Potential Assistance needed at discharge: Durable Medical Equipment            Potential DME:    Patient expects to

## 2023-10-31 LAB
ALBUMIN SERPL-MCNC: 3.1 G/DL (ref 3.5–5.2)
ALBUMIN/GLOB SERPL: 0.7 {RATIO} (ref 1–2.5)
ALP SERPL-CCNC: 110 U/L (ref 40–129)
ALT SERPL-CCNC: 60 U/L (ref 5–41)
ANION GAP SERPL CALCULATED.3IONS-SCNC: 11 MMOL/L (ref 9–17)
AST SERPL-CCNC: 32 U/L
BASOPHILS # BLD: 0.04 K/UL (ref 0–0.2)
BASOPHILS NFR BLD: 0 % (ref 0–2)
BILIRUB SERPL-MCNC: 0.7 MG/DL (ref 0.3–1.2)
BUN SERPL-MCNC: 12 MG/DL (ref 6–20)
BUN/CREAT SERPL: 17 (ref 9–20)
CALCIUM SERPL-MCNC: 9 MG/DL (ref 8.6–10.4)
CHLORIDE SERPL-SCNC: 94 MMOL/L (ref 98–107)
CO2 SERPL-SCNC: 26 MMOL/L (ref 20–31)
CREAT SERPL-MCNC: 0.7 MG/DL (ref 0.7–1.2)
CRP SERPL HS-MCNC: 213.7 MG/L (ref 0–5)
EOSINOPHIL # BLD: 0.04 K/UL (ref 0–0.44)
EOSINOPHILS RELATIVE PERCENT: 0 % (ref 1–4)
ERYTHROCYTE [DISTWIDTH] IN BLOOD BY AUTOMATED COUNT: 13.2 % (ref 11.8–14.4)
GFR SERPL CREATININE-BSD FRML MDRD: >60 ML/MIN/1.73M2
GLUCOSE BLD-MCNC: 127 MG/DL (ref 74–100)
GLUCOSE BLD-MCNC: 164 MG/DL (ref 74–100)
GLUCOSE BLD-MCNC: 180 MG/DL (ref 74–100)
GLUCOSE BLD-MCNC: 210 MG/DL (ref 74–100)
GLUCOSE SERPL-MCNC: 157 MG/DL (ref 70–99)
HCT VFR BLD AUTO: 41.3 % (ref 40.7–50.3)
HGB BLD-MCNC: 13.3 G/DL (ref 13–17)
IMM GRANULOCYTES # BLD AUTO: 0.08 K/UL (ref 0–0.3)
IMM GRANULOCYTES NFR BLD: 1 %
LYMPHOCYTES NFR BLD: 0.8 K/UL (ref 1.1–3.7)
LYMPHOCYTES RELATIVE PERCENT: 9 % (ref 24–43)
MCH RBC QN AUTO: 32.1 PG (ref 25.2–33.5)
MCHC RBC AUTO-ENTMCNC: 32.2 G/DL (ref 28.4–34.8)
MCV RBC AUTO: 99.8 FL (ref 82.6–102.9)
MONOCYTES NFR BLD: 1.02 K/UL (ref 0.1–1.2)
MONOCYTES NFR BLD: 11 % (ref 3–12)
NEUTROPHILS NFR BLD: 79 % (ref 36–65)
NEUTS SEG NFR BLD: 7.3 K/UL (ref 1.5–8.1)
NRBC BLD-RTO: 0 PER 100 WBC
PLATELET # BLD AUTO: 211 K/UL (ref 138–453)
PMV BLD AUTO: 11.9 FL (ref 8.1–13.5)
POTASSIUM SERPL-SCNC: 4.6 MMOL/L (ref 3.7–5.3)
PROT SERPL-MCNC: 7.5 G/DL (ref 6.4–8.3)
RBC # BLD AUTO: 4.14 M/UL (ref 4.21–5.77)
SODIUM SERPL-SCNC: 131 MMOL/L (ref 135–144)
WBC OTHER # BLD: 9.3 K/UL (ref 3.5–11.3)

## 2023-10-31 PROCEDURE — 82947 ASSAY GLUCOSE BLOOD QUANT: CPT

## 2023-10-31 PROCEDURE — 94761 N-INVAS EAR/PLS OXIMETRY MLT: CPT

## 2023-10-31 PROCEDURE — 80053 COMPREHEN METABOLIC PANEL: CPT

## 2023-10-31 PROCEDURE — 2580000003 HC RX 258: Performed by: NURSE PRACTITIONER

## 2023-10-31 PROCEDURE — 6370000000 HC RX 637 (ALT 250 FOR IP): Performed by: NURSE PRACTITIONER

## 2023-10-31 PROCEDURE — 6360000002 HC RX W HCPCS: Performed by: INTERNAL MEDICINE

## 2023-10-31 PROCEDURE — 86140 C-REACTIVE PROTEIN: CPT

## 2023-10-31 PROCEDURE — 36415 COLL VENOUS BLD VENIPUNCTURE: CPT

## 2023-10-31 PROCEDURE — 6370000000 HC RX 637 (ALT 250 FOR IP): Performed by: INTERNAL MEDICINE

## 2023-10-31 PROCEDURE — 85025 COMPLETE CBC W/AUTO DIFF WBC: CPT

## 2023-10-31 PROCEDURE — 1200000000 HC SEMI PRIVATE

## 2023-10-31 PROCEDURE — 2580000003 HC RX 258: Performed by: INTERNAL MEDICINE

## 2023-10-31 RX ORDER — COLCHICINE 0.6 MG/1
0.6 TABLET ORAL EVERY OTHER DAY
Status: DISCONTINUED | OUTPATIENT
Start: 2023-10-31 | End: 2023-11-02 | Stop reason: HOSPADM

## 2023-10-31 RX ORDER — HYDROCODONE BITARTRATE AND ACETAMINOPHEN 5; 325 MG/1; MG/1
2 TABLET ORAL EVERY 4 HOURS PRN
Status: DISCONTINUED | OUTPATIENT
Start: 2023-10-31 | End: 2023-11-02 | Stop reason: HOSPADM

## 2023-10-31 RX ORDER — HYDROCODONE BITARTRATE AND ACETAMINOPHEN 5; 325 MG/1; MG/1
1 TABLET ORAL EVERY 4 HOURS PRN
Status: DISCONTINUED | OUTPATIENT
Start: 2023-10-31 | End: 2023-11-02 | Stop reason: HOSPADM

## 2023-10-31 RX ORDER — LINEZOLID 2 MG/ML
600 INJECTION, SOLUTION INTRAVENOUS EVERY 12 HOURS
Status: DISCONTINUED | OUTPATIENT
Start: 2023-10-31 | End: 2023-11-01

## 2023-10-31 RX ADMIN — GLIPIZIDE 5 MG: 5 TABLET ORAL at 06:59

## 2023-10-31 RX ADMIN — APIXABAN 5 MG: 5 TABLET, FILM COATED ORAL at 20:19

## 2023-10-31 RX ADMIN — APIXABAN 5 MG: 5 TABLET, FILM COATED ORAL at 07:12

## 2023-10-31 RX ADMIN — ASPIRIN 325 MG: 325 TABLET, COATED ORAL at 07:12

## 2023-10-31 RX ADMIN — ACETAMINOPHEN 650 MG: 325 TABLET ORAL at 10:19

## 2023-10-31 RX ADMIN — METFORMIN HYDROCHLORIDE 1000 MG: 500 TABLET ORAL at 20:19

## 2023-10-31 RX ADMIN — TRAMADOL HYDROCHLORIDE 50 MG: 50 TABLET, COATED ORAL at 05:35

## 2023-10-31 RX ADMIN — LINEZOLID 600 MG: 600 INJECTION, SOLUTION INTRAVENOUS at 11:48

## 2023-10-31 RX ADMIN — METFORMIN HYDROCHLORIDE 1000 MG: 500 TABLET ORAL at 07:12

## 2023-10-31 RX ADMIN — SODIUM CHLORIDE, PRESERVATIVE FREE 10 ML: 5 INJECTION INTRAVENOUS at 07:13

## 2023-10-31 RX ADMIN — METOPROLOL 100 MG: 100 TABLET ORAL at 07:12

## 2023-10-31 RX ADMIN — DIGOXIN 250 MCG: 0.25 TABLET ORAL at 07:12

## 2023-10-31 RX ADMIN — DILTIAZEM HYDROCHLORIDE 120 MG: 60 CAPSULE, EXTENDED RELEASE ORAL at 20:19

## 2023-10-31 RX ADMIN — GLIPIZIDE 5 MG: 5 TABLET ORAL at 16:21

## 2023-10-31 RX ADMIN — FAMOTIDINE 20 MG: 20 TABLET ORAL at 07:12

## 2023-10-31 RX ADMIN — LINEZOLID 600 MG: 600 INJECTION, SOLUTION INTRAVENOUS at 23:09

## 2023-10-31 RX ADMIN — SODIUM CHLORIDE, PRESERVATIVE FREE 10 ML: 5 INJECTION INTRAVENOUS at 20:19

## 2023-10-31 RX ADMIN — FAMOTIDINE 20 MG: 20 TABLET ORAL at 20:19

## 2023-10-31 RX ADMIN — ATORVASTATIN CALCIUM 40 MG: 40 TABLET, FILM COATED ORAL at 07:12

## 2023-10-31 RX ADMIN — CEFTRIAXONE SODIUM 2000 MG: 2 INJECTION, POWDER, FOR SOLUTION INTRAMUSCULAR; INTRAVENOUS at 12:58

## 2023-10-31 RX ADMIN — COLCHICINE 0.6 MG: 0.6 TABLET ORAL at 08:48

## 2023-10-31 RX ADMIN — METOPROLOL 100 MG: 100 TABLET ORAL at 20:19

## 2023-10-31 RX ADMIN — DILTIAZEM HYDROCHLORIDE 120 MG: 60 CAPSULE, EXTENDED RELEASE ORAL at 07:12

## 2023-10-31 RX ADMIN — HYDROCODONE BITARTRATE AND ACETAMINOPHEN 1 TABLET: 5; 325 TABLET ORAL at 20:21

## 2023-10-31 ASSESSMENT — PAIN SCALES - GENERAL
PAINLEVEL_OUTOF10: 4
PAINLEVEL_OUTOF10: 5
PAINLEVEL_OUTOF10: 6
PAINLEVEL_OUTOF10: 0
PAINLEVEL_OUTOF10: 4

## 2023-10-31 ASSESSMENT — PAIN - FUNCTIONAL ASSESSMENT
PAIN_FUNCTIONAL_ASSESSMENT: ACTIVITIES ARE NOT PREVENTED
PAIN_FUNCTIONAL_ASSESSMENT: ACTIVITIES ARE NOT PREVENTED
PAIN_FUNCTIONAL_ASSESSMENT: PREVENTS OR INTERFERES SOME ACTIVE ACTIVITIES AND ADLS

## 2023-10-31 ASSESSMENT — PAIN DESCRIPTION - DESCRIPTORS
DESCRIPTORS: ACHING;DISCOMFORT

## 2023-10-31 ASSESSMENT — PAIN DESCRIPTION - LOCATION
LOCATION: LEG

## 2023-10-31 ASSESSMENT — PAIN DESCRIPTION - ORIENTATION
ORIENTATION: RIGHT

## 2023-10-31 ASSESSMENT — PAIN DESCRIPTION - FREQUENCY
FREQUENCY: CONTINUOUS

## 2023-10-31 ASSESSMENT — PAIN DESCRIPTION - ONSET
ONSET: ON-GOING

## 2023-10-31 ASSESSMENT — PAIN DESCRIPTION - PAIN TYPE
TYPE: ACUTE PAIN
TYPE: ACUTE PAIN

## 2023-10-31 NOTE — PLAN OF CARE
Problem: Discharge Planning  Goal: Discharge to home or other facility with appropriate resources  Outcome: Progressing  Flowsheets (Taken 10/30/2023 1900 by David Armendariz RN)  Discharge to home or other facility with appropriate resources:   Identify barriers to discharge with patient and caregiver   Arrange for needed discharge resources and transportation as appropriate   Identify discharge learning needs (meds, wound care, etc)   Arrange for interpreters to assist at discharge as needed   Refer to discharge planning if patient needs post-hospital services based on physician order or complex needs related to functional status, cognitive ability or social support system     Problem: Safety - Adult  Goal: Free from fall injury  Outcome: Progressing     Problem: Pain  Goal: Verbalizes/displays adequate comfort level or baseline comfort level  Outcome: Progressing     Problem: Nutrition Deficit:  Goal: Optimize nutritional status  Outcome: Progressing

## 2023-11-01 LAB
ALBUMIN SERPL-MCNC: 2.7 G/DL (ref 3.5–5.2)
ALBUMIN/GLOB SERPL: 0.6 {RATIO} (ref 1–2.5)
ALP SERPL-CCNC: 115 U/L (ref 40–129)
ALT SERPL-CCNC: 42 U/L (ref 5–41)
ANION GAP SERPL CALCULATED.3IONS-SCNC: 7 MMOL/L (ref 9–17)
AST SERPL-CCNC: 21 U/L
BASOPHILS # BLD: 0.05 K/UL (ref 0–0.2)
BASOPHILS NFR BLD: 0 % (ref 0–2)
BILIRUB SERPL-MCNC: 0.8 MG/DL (ref 0.3–1.2)
BUN SERPL-MCNC: 13 MG/DL (ref 6–20)
BUN/CREAT SERPL: 22 (ref 9–20)
CALCIUM SERPL-MCNC: 8.9 MG/DL (ref 8.6–10.4)
CHLORIDE SERPL-SCNC: 96 MMOL/L (ref 98–107)
CO2 SERPL-SCNC: 28 MMOL/L (ref 20–31)
CREAT SERPL-MCNC: 0.6 MG/DL (ref 0.7–1.2)
CRP SERPL HS-MCNC: 263.3 MG/L (ref 0–5)
EOSINOPHIL # BLD: 0.08 K/UL (ref 0–0.44)
EOSINOPHILS RELATIVE PERCENT: 1 % (ref 1–4)
ERYTHROCYTE [DISTWIDTH] IN BLOOD BY AUTOMATED COUNT: 13 % (ref 11.8–14.4)
GFR SERPL CREATININE-BSD FRML MDRD: >60 ML/MIN/1.73M2
GLUCOSE BLD-MCNC: 153 MG/DL (ref 74–100)
GLUCOSE BLD-MCNC: 158 MG/DL (ref 74–100)
GLUCOSE BLD-MCNC: 178 MG/DL (ref 74–100)
GLUCOSE BLD-MCNC: 179 MG/DL (ref 74–100)
GLUCOSE SERPL-MCNC: 175 MG/DL (ref 70–99)
HCT VFR BLD AUTO: 38.4 % (ref 40.7–50.3)
HGB BLD-MCNC: 12.9 G/DL (ref 13–17)
IMM GRANULOCYTES # BLD AUTO: 0.08 K/UL (ref 0–0.3)
IMM GRANULOCYTES NFR BLD: 1 %
LYMPHOCYTES NFR BLD: 0.83 K/UL (ref 1.1–3.7)
LYMPHOCYTES RELATIVE PERCENT: 7 % (ref 24–43)
MCH RBC QN AUTO: 32.8 PG (ref 25.2–33.5)
MCHC RBC AUTO-ENTMCNC: 33.6 G/DL (ref 28.4–34.8)
MCV RBC AUTO: 97.7 FL (ref 82.6–102.9)
MICROORGANISM SPEC CULT: NORMAL
MICROORGANISM SPEC CULT: NORMAL
MONOCYTES NFR BLD: 0.87 K/UL (ref 0.1–1.2)
MONOCYTES NFR BLD: 8 % (ref 3–12)
NEUTROPHILS NFR BLD: 83 % (ref 36–65)
NEUTS SEG NFR BLD: 9.41 K/UL (ref 1.5–8.1)
NRBC BLD-RTO: 0 PER 100 WBC
PLATELET # BLD AUTO: 271 K/UL (ref 138–453)
PMV BLD AUTO: 11.7 FL (ref 8.1–13.5)
POTASSIUM SERPL-SCNC: 4.3 MMOL/L (ref 3.7–5.3)
PROT SERPL-MCNC: 7.3 G/DL (ref 6.4–8.3)
RBC # BLD AUTO: 3.93 M/UL (ref 4.21–5.77)
SERVICE CMNT-IMP: NORMAL
SERVICE CMNT-IMP: NORMAL
SODIUM SERPL-SCNC: 131 MMOL/L (ref 135–144)
SPECIMEN DESCRIPTION: NORMAL
SPECIMEN DESCRIPTION: NORMAL
WBC OTHER # BLD: 11.3 K/UL (ref 3.5–11.3)

## 2023-11-01 PROCEDURE — 85025 COMPLETE CBC W/AUTO DIFF WBC: CPT

## 2023-11-01 PROCEDURE — 94761 N-INVAS EAR/PLS OXIMETRY MLT: CPT

## 2023-11-01 PROCEDURE — 82947 ASSAY GLUCOSE BLOOD QUANT: CPT

## 2023-11-01 PROCEDURE — 86140 C-REACTIVE PROTEIN: CPT

## 2023-11-01 PROCEDURE — 6370000000 HC RX 637 (ALT 250 FOR IP): Performed by: INTERNAL MEDICINE

## 2023-11-01 PROCEDURE — 2580000003 HC RX 258: Performed by: NURSE PRACTITIONER

## 2023-11-01 PROCEDURE — 6360000002 HC RX W HCPCS: Performed by: INTERNAL MEDICINE

## 2023-11-01 PROCEDURE — 6370000000 HC RX 637 (ALT 250 FOR IP)

## 2023-11-01 PROCEDURE — 1200000000 HC SEMI PRIVATE

## 2023-11-01 PROCEDURE — 80053 COMPREHEN METABOLIC PANEL: CPT

## 2023-11-01 PROCEDURE — 2580000003 HC RX 258: Performed by: INTERNAL MEDICINE

## 2023-11-01 PROCEDURE — 6370000000 HC RX 637 (ALT 250 FOR IP): Performed by: NURSE PRACTITIONER

## 2023-11-01 PROCEDURE — 36415 COLL VENOUS BLD VENIPUNCTURE: CPT

## 2023-11-01 RX ORDER — CETIRIZINE HYDROCHLORIDE 10 MG/1
10 TABLET ORAL ONCE
Status: COMPLETED | OUTPATIENT
Start: 2023-11-01 | End: 2023-11-01

## 2023-11-01 RX ADMIN — DILTIAZEM HYDROCHLORIDE 120 MG: 60 CAPSULE, EXTENDED RELEASE ORAL at 08:47

## 2023-11-01 RX ADMIN — VANCOMYCIN HYDROCHLORIDE 2000 MG: 1 INJECTION, POWDER, LYOPHILIZED, FOR SOLUTION INTRAVENOUS at 12:39

## 2023-11-01 RX ADMIN — APIXABAN 5 MG: 5 TABLET, FILM COATED ORAL at 08:47

## 2023-11-01 RX ADMIN — ASPIRIN 325 MG: 325 TABLET, COATED ORAL at 08:48

## 2023-11-01 RX ADMIN — SODIUM CHLORIDE, PRESERVATIVE FREE 10 ML: 5 INJECTION INTRAVENOUS at 08:48

## 2023-11-01 RX ADMIN — METFORMIN HYDROCHLORIDE 1000 MG: 500 TABLET ORAL at 21:59

## 2023-11-01 RX ADMIN — FAMOTIDINE 20 MG: 20 TABLET ORAL at 21:59

## 2023-11-01 RX ADMIN — MEROPENEM 1000 MG: 1 INJECTION, POWDER, FOR SOLUTION INTRAVENOUS at 12:17

## 2023-11-01 RX ADMIN — FAMOTIDINE 20 MG: 20 TABLET ORAL at 08:47

## 2023-11-01 RX ADMIN — CETIRIZINE HYDROCHLORIDE 10 MG: 10 TABLET, FILM COATED ORAL at 02:03

## 2023-11-01 RX ADMIN — GLIPIZIDE 5 MG: 5 TABLET ORAL at 07:24

## 2023-11-01 RX ADMIN — ATORVASTATIN CALCIUM 40 MG: 40 TABLET, FILM COATED ORAL at 08:47

## 2023-11-01 RX ADMIN — APIXABAN 5 MG: 5 TABLET, FILM COATED ORAL at 21:59

## 2023-11-01 RX ADMIN — GLIPIZIDE 5 MG: 5 TABLET ORAL at 16:36

## 2023-11-01 RX ADMIN — METOPROLOL 100 MG: 100 TABLET ORAL at 08:47

## 2023-11-01 RX ADMIN — Medication 5 MG: at 21:59

## 2023-11-01 RX ADMIN — SODIUM CHLORIDE, PRESERVATIVE FREE 10 ML: 5 INJECTION INTRAVENOUS at 22:01

## 2023-11-01 RX ADMIN — HYDROCODONE BITARTRATE AND ACETAMINOPHEN 1 TABLET: 5; 325 TABLET ORAL at 19:40

## 2023-11-01 RX ADMIN — DIGOXIN 250 MCG: 0.25 TABLET ORAL at 08:47

## 2023-11-01 RX ADMIN — METFORMIN HYDROCHLORIDE 1000 MG: 500 TABLET ORAL at 08:47

## 2023-11-01 RX ADMIN — HYDROCODONE BITARTRATE AND ACETAMINOPHEN 1 TABLET: 5; 325 TABLET ORAL at 11:55

## 2023-11-01 RX ADMIN — MEROPENEM 1000 MG: 1 INJECTION, POWDER, FOR SOLUTION INTRAVENOUS at 19:46

## 2023-11-01 RX ADMIN — METOPROLOL 100 MG: 100 TABLET ORAL at 21:59

## 2023-11-01 ASSESSMENT — PAIN DESCRIPTION - DESCRIPTORS
DESCRIPTORS: ACHING;DISCOMFORT

## 2023-11-01 ASSESSMENT — PAIN DESCRIPTION - LOCATION
LOCATION: LEG

## 2023-11-01 ASSESSMENT — PAIN SCALES - GENERAL
PAINLEVEL_OUTOF10: 5
PAINLEVEL_OUTOF10: 0
PAINLEVEL_OUTOF10: 2
PAINLEVEL_OUTOF10: 5
PAINLEVEL_OUTOF10: 4
PAINLEVEL_OUTOF10: 3

## 2023-11-01 ASSESSMENT — PAIN DESCRIPTION - FREQUENCY
FREQUENCY: CONTINUOUS

## 2023-11-01 ASSESSMENT — PAIN DESCRIPTION - PAIN TYPE
TYPE: ACUTE PAIN

## 2023-11-01 ASSESSMENT — PAIN - FUNCTIONAL ASSESSMENT
PAIN_FUNCTIONAL_ASSESSMENT: ACTIVITIES ARE NOT PREVENTED

## 2023-11-01 ASSESSMENT — PAIN DESCRIPTION - ORIENTATION
ORIENTATION: RIGHT
ORIENTATION: LEFT;RIGHT
ORIENTATION: RIGHT;LEFT
ORIENTATION: RIGHT

## 2023-11-01 ASSESSMENT — PAIN DESCRIPTION - ONSET
ONSET: ON-GOING

## 2023-11-01 NOTE — PLAN OF CARE
Problem: Discharge Planning  Goal: Discharge to home or other facility with appropriate resources  Outcome: Progressing     Problem: Safety - Adult  Goal: Free from fall injury  Outcome: Progressing  Flowsheets (Taken 11/1/2023 0212 by Trish Arias RN)  Free From Fall Injury: Instruct family/caregiver on patient safety     Problem: Pain  Goal: Verbalizes/displays adequate comfort level or baseline comfort level  Outcome: Progressing     Problem: Nutrition Deficit:  Goal: Optimize nutritional status  Outcome: Progressing     Problem: Chronic Conditions and Co-morbidities  Goal: Patient's chronic conditions and co-morbidity symptoms are monitored and maintained or improved  Outcome: Progressing

## 2023-11-02 VITALS
DIASTOLIC BLOOD PRESSURE: 95 MMHG | HEART RATE: 134 BPM | WEIGHT: 309.97 LBS | HEIGHT: 71 IN | TEMPERATURE: 97.6 F | SYSTOLIC BLOOD PRESSURE: 148 MMHG | BODY MASS INDEX: 43.4 KG/M2 | OXYGEN SATURATION: 95 % | RESPIRATION RATE: 20 BRPM

## 2023-11-02 LAB
CRP SERPL HS-MCNC: 233.8 MG/L (ref 0–5)
GLUCOSE BLD-MCNC: 162 MG/DL (ref 74–100)

## 2023-11-02 PROCEDURE — 6370000000 HC RX 637 (ALT 250 FOR IP): Performed by: INTERNAL MEDICINE

## 2023-11-02 PROCEDURE — 82947 ASSAY GLUCOSE BLOOD QUANT: CPT

## 2023-11-02 PROCEDURE — 86140 C-REACTIVE PROTEIN: CPT

## 2023-11-02 PROCEDURE — 2580000003 HC RX 258: Performed by: INTERNAL MEDICINE

## 2023-11-02 PROCEDURE — 6360000002 HC RX W HCPCS: Performed by: INTERNAL MEDICINE

## 2023-11-02 PROCEDURE — 2580000003 HC RX 258: Performed by: NURSE PRACTITIONER

## 2023-11-02 PROCEDURE — 36415 COLL VENOUS BLD VENIPUNCTURE: CPT

## 2023-11-02 PROCEDURE — 6370000000 HC RX 637 (ALT 250 FOR IP): Performed by: NURSE PRACTITIONER

## 2023-11-02 RX ORDER — HYDROCODONE BITARTRATE AND ACETAMINOPHEN 5; 325 MG/1; MG/1
2 TABLET ORAL ONCE
Status: COMPLETED | OUTPATIENT
Start: 2023-11-02 | End: 2023-11-02

## 2023-11-02 RX ORDER — DIPHENHYDRAMINE HCL 25 MG
50 CAPSULE ORAL ONCE
Status: COMPLETED | OUTPATIENT
Start: 2023-11-02 | End: 2023-11-02

## 2023-11-02 RX ADMIN — APIXABAN 5 MG: 5 TABLET, FILM COATED ORAL at 07:17

## 2023-11-02 RX ADMIN — DIGOXIN 250 MCG: 0.25 TABLET ORAL at 07:17

## 2023-11-02 RX ADMIN — ASPIRIN 325 MG: 325 TABLET, COATED ORAL at 07:17

## 2023-11-02 RX ADMIN — SODIUM CHLORIDE, PRESERVATIVE FREE 10 ML: 5 INJECTION INTRAVENOUS at 07:17

## 2023-11-02 RX ADMIN — GLIPIZIDE 5 MG: 5 TABLET ORAL at 07:17

## 2023-11-02 RX ADMIN — METOPROLOL 100 MG: 100 TABLET ORAL at 07:16

## 2023-11-02 RX ADMIN — VANCOMYCIN HYDROCHLORIDE 2000 MG: 1 INJECTION, POWDER, LYOPHILIZED, FOR SOLUTION INTRAVENOUS at 00:23

## 2023-11-02 RX ADMIN — COLCHICINE 0.6 MG: 0.6 TABLET ORAL at 07:17

## 2023-11-02 RX ADMIN — HYDROCODONE BITARTRATE AND ACETAMINOPHEN 2 TABLET: 5; 325 TABLET ORAL at 07:56

## 2023-11-02 RX ADMIN — HYDROCODONE BITARTRATE AND ACETAMINOPHEN 1 TABLET: 5; 325 TABLET ORAL at 05:19

## 2023-11-02 RX ADMIN — METFORMIN HYDROCHLORIDE 1000 MG: 500 TABLET ORAL at 07:17

## 2023-11-02 RX ADMIN — FAMOTIDINE 20 MG: 20 TABLET ORAL at 07:16

## 2023-11-02 RX ADMIN — DIPHENHYDRAMINE HYDROCHLORIDE 50 MG: 25 CAPSULE ORAL at 07:47

## 2023-11-02 RX ADMIN — MEROPENEM 1000 MG: 1 INJECTION, POWDER, FOR SOLUTION INTRAVENOUS at 04:13

## 2023-11-02 RX ADMIN — ATORVASTATIN CALCIUM 40 MG: 40 TABLET, FILM COATED ORAL at 07:17

## 2023-11-02 ASSESSMENT — PAIN DESCRIPTION - ONSET: ONSET: ON-GOING

## 2023-11-02 ASSESSMENT — PAIN SCALES - GENERAL
PAINLEVEL_OUTOF10: 4
PAINLEVEL_OUTOF10: 5
PAINLEVEL_OUTOF10: 4

## 2023-11-02 ASSESSMENT — PAIN DESCRIPTION - LOCATION
LOCATION: LEG
LOCATION: LEG

## 2023-11-02 ASSESSMENT — PAIN - FUNCTIONAL ASSESSMENT: PAIN_FUNCTIONAL_ASSESSMENT: ACTIVITIES ARE NOT PREVENTED

## 2023-11-02 ASSESSMENT — PAIN DESCRIPTION - DESCRIPTORS
DESCRIPTORS: ACHING;SHARP
DESCRIPTORS: ACHING

## 2023-11-02 ASSESSMENT — PAIN DESCRIPTION - PAIN TYPE: TYPE: ACUTE PAIN

## 2023-11-02 ASSESSMENT — PAIN DESCRIPTION - ORIENTATION
ORIENTATION: RIGHT
ORIENTATION: RIGHT

## 2023-11-02 ASSESSMENT — PAIN DESCRIPTION - FREQUENCY: FREQUENCY: CONTINUOUS

## 2023-11-02 NOTE — DISCHARGE SUMMARY
lisinopril 20 MG tablet  Commonly known as: PRINIVIL;ZESTRIL  Take 1 tablet by mouth daily     metFORMIN 1000 MG tablet  Commonly known as: GLUCOPHAGE  Take 1 tablet by mouth 2 times daily (with meals)     metoprolol 100 MG tablet  Commonly known as: LOPRESSOR  TAKE 1 TABLET TWICE A DAY     Trulicity 3 QH/9.9IN Sopn  Generic drug: Dulaglutide  Inject 3 mg into the skin once a week            STOP taking these medications      dilTIAZem 120 MG extended release capsule  Commonly known as: CARDIZEM 12 HR              Patient Instructions:    Activity: activity as tolerated  Diet: cardiac diet  Wound Care: none needed  Other: none     Disposition:   Transfer to [facility] for tertiary care    Follow up:  Patient will be followed by KILLIAN Isaac CNP in 1-2 weeks    CORE MEASURES on Discharge (if applicable)  ACE/ARB in CHF: NA  Statin in MI: NA  ASA in MI: NA  Statin in CVA: NA  Antiplatelet in CVA: NA    Total time spent on discharge services: 45 minutes    Including the following activities:  Evaluation and Management of patient  Discussion with patient and/or surrogate about current care plan  Coordination with Case Management and/or   Coordination of care with Consultants (if applicable)   Coordination of care with Receiving Facility Physician (if applicable)  Completion of DME forms (if applicable)  Preparation of Discharge Summary  Preparation of Medication Reconciliation  Preparation of Discharge Prescriptions    Signed:  KILLIAN Abdul CNP, KILLIAN, NP-C  11/2/2023, 1:49 PM

## 2023-11-02 NOTE — VIRTUAL HEALTH
Becky Villarreal, was evaluated through a synchronous (real-time) audio-video encounter. The patient (and/or guardian if applicable) is aware that this is a billable service, which includes applicable co-pays. This virtual visit was conducted with patient's (and/or legal guardian's) consent. Patient identification was verified, and a caregiver was present when appropriate. The patient was located at [facility] ([address] [place] [address]): [address] [facility] AT THE [facility] MED SURG  [address]  [place] [address]  Loc: [phone]  The provider was located at [facility] PSYCHIATRIC REHABILITATION CT Dept): [facility] INF DIS  [address] [facility] [address] [phone]    Consults     Total time spent on this encounter: Not billed by time    --Tamela Cabot, MD on 11/1/2023 at 7:19 AM    An electronic signature was used to authenticate this note. [facility] - Progress Note Telemedicine  Today's Date and Time: 11/1/2023, 7:19 AM    Impression :   Cellulitis Rt leg  Border of the area of cellulitis suggests a Streptococcal infection  Bulla formation and area of central desquamation starting to occur  DM2  Essential HTN  Cardiomyopathy  Hx of paroxysmal A fib with prior cardioversion and ablation procedures  Obesity  DIAZ  Gout  Allergy to clarithromycin    Recommendations:   Transfer to [employer]  Meropenem 1 gm IV q 8 hr  Vancomycin 2000 mg IV q 12 hr  D/C Ceftriaxone - Unclear if new rash is secondary to ceftriaxone  D/C Linezolid - Unclear if new rash is secondary to linezolid    Medical Decision Making/Summary/Discussion:11/1/2023     Patient with Rt leg cellulitis  Underlying DM 2, essential HTN and cardiomyopathy  Cellulitis failing to respond to treatment  Elevated CRP  Patient evaluated by Telemedicine.   Requesting Institution: [facility]  Provider Institution: [facility]  Intermediary Person at [facility]: AMIRAH Bridges  Discussed diagnosis,
Chidi Pablo, was evaluated through a synchronous (real-time) audio-video encounter. The patient (and/or guardian if applicable) is aware that this is a billable service, which includes applicable co-pays. This virtual visit was conducted with patient's (and/or legal guardian's) consent. Patient identification was verified, and a caregiver was present when appropriate. The patient was located at [facility] ([address] [place] [address]): [facility] [address] AT THE [facility] MED SURG  [address]  [place] [address]  Loc: [phone]  The provider was located at [facility] PSYCHIATRIC REHABILITATION CT Dept): [facility] INF DIS  [address] [facility] [address]  [phone]    Consults     Total time spent on this encounter: Not billed by time    --Jena Tristan MD on 11/2/2023 at 7:19 AM    An electronic signature was used to authenticate this note. [facility] - Progress Note Telemedicine  Today's Date and Time: 11/2/2023, 7:19 AM    Impression :   Cellulitis Rt leg  Border of the area of cellulitis suggests a Streptococcal infection  Bulla formation and area of central desquamation starting to occur  DM2  Essential HTN  Cardiomyopathy  Hx of paroxysmal A fib with prior cardioversion and ablation procedures  Obesity  DIAZ  Gout  Allergy to clarithromycin    Recommendations:   Transfer to [address] [place] [address]  Meropenem 1 gm IV q 8 hr  Vancomycin 2000 mg IV q 12 hr  D/C Ceftriaxone - Unclear if new rash is secondary to ceftriaxone  D/C Linezolid - Unclear if new rash is secondary to linezolid    Medical Decision Making/Summary/Discussion:11/2/2023     Patient with Rt leg cellulitis  Underlying DM 2, essential HTN and cardiomyopathy  Cellulitis failing to respond to treatment  Elevated CRP  Patient evaluated by Telemedicine.   Requesting Institution: [facility]  Provider Institution: [address] [place] [address]  Intermediary Person at [facility]: AMIRAH Villeda  Discussed diagnosis,
Social Determinants of Health     Financial Resource Strain: Low Risk  (10/13/2023)    Overall Financial Resource Strain (CARDIA)     Difficulty of Paying Living Expenses: Not hard at all   Food Insecurity: No Food Insecurity (10/13/2023)    Hunger Vital Sign     Worried About Running Out of Food in the Last Year: Never true     Ran Out of Food in the Last Year: Never true   Transportation Needs: No Transportation Needs (10/13/2023)    PRAPARE - Transportation     Lack of Transportation (Medical): No     Lack of Transportation (Non-Medical): No   Physical Activity: Inactive (10/13/2023)    Exercise Vital Sign     Days of Exercise per Week: 0 days     Minutes of Exercise per Session: 0 min   Stress: No Stress Concern Present (10/13/2023)    [address] [facility]     Feeling of Stress : Not at all   Social Connections: Moderately Isolated (10/13/2023)    Social Connection and Isolation Panel [NHANES]     Frequency of Communication with Friends and Family: More than three times a week     Frequency of Social Gatherings with Friends and Family:  Three times a week     Attends Baptism Services: More than 4 times per year     Active Member of Clubs or Organizations: No     Attends Club or Organization Meetings: Never     Marital Status:    Intimate Partner Violence: Not At Risk (10/13/2023)    Humiliation, Afraid, Rape, and Kick questionnaire     Fear of Current or Ex-Partner: No     Emotionally Abused: No     Physically Abused: No     Sexually Abused: No   Housing Stability: Unknown (10/13/2023)    Housing Stability Vital Sign     Unable to Pay for Housing in the Last Year: No     Number of [address] in the Last Year: Not on file     Unstable Housing in the Last Year: No       Family History:     Family History   Problem Relation Age of Onset    Atrial Fibrillation Mother     Heart Attack Father     Cancer Father     Atrial Fibrillation Brother     No
extremity venous right    Result Date: 10/28/2023    No evidence of deep vein or superficial vein thrombosis in the right lower extremity. CT Head W/O Contrast    Result Date: 10/27/2023  EXAMINATION: CT OF THE HEAD WITHOUT CONTRAST  10/27/2023 10:29 am TECHNIQUE: CT of the head was performed without the administration of intravenous contrast. Automated exposure control, iterative reconstruction, and/or weight based adjustment of the mA/kV was utilized to reduce the radiation dose to as low as reasonably achievable. COMPARISON: None. HISTORY: ORDERING SYSTEM PROVIDED HISTORY: dizziness, blurred vision TECHNOLOGIST PROVIDED HISTORY: dizziness, blurred vision Decision Support Exception - unselect if not a suspected or confirmed emergency medical condition->Emergency Medical Condition (MA) FINDINGS: BRAIN/VENTRICLES: There is no acute intracranial hemorrhage, mass effect or midline shift. No abnormal extra-axial fluid collection. The gray-white differentiation is maintained without evidence of an acute infarct. There is no evidence of hydrocephalus. ORBITS: The visualized portion of the orbits demonstrate no acute abnormality. SINUSES: The visualized paranasal sinuses and mastoid air cells demonstrate no acute abnormality. SOFT TISSUES/SKULL:  No acute abnormality of the visualized skull or soft tissues. No acute intracranial abnormality. XR CHEST PORTABLE    Result Date: 10/27/2023  EXAMINATION: ONE XRAY VIEW OF THE CHEST 10/27/2023 10:31 am COMPARISON: None. HISTORY: ORDERING SYSTEM PROVIDED HISTORY: other TECHNOLOGIST PROVIDED HISTORY: other FINDINGS: Mediastinum/Heart: The mediastinal contours are normal. The heart appears normal in size. Lungs: Prominent bilateral interstitial lung markings are present without focal consolidation. Pleura: No pleural effusion. No pneumothorax. No radiographic evidence of acute cardiopulmonary pathology.        Medical Decision [id number]:     Results

## 2023-11-02 NOTE — PLAN OF CARE
Problem: Discharge Planning  Goal: Discharge to home or other facility with appropriate resources  11/1/2023 2327 by Emerald Lockhart RN  Outcome: Progressing  Flowsheets (Taken 11/1/2023 2327)  Discharge to home or other facility with appropriate resources:   Identify barriers to discharge with patient and caregiver   Identify discharge learning needs (meds, wound care, etc)     Problem: Safety - Adult  Goal: Free from fall injury  11/1/2023 2327 by Emerald Lockhart RN  Outcome: Progressing  Flowsheets (Taken 11/1/2023 2327)  Free From Fall Injury: Instruct family/caregiver on patient safety     Problem: Pain  Goal: Verbalizes/displays adequate comfort level or baseline comfort level  11/1/2023 2327 by Emerald Lockhart RN  Outcome: Progressing  Flowsheets (Taken 11/1/2023 2327)  Verbalizes/displays adequate comfort level or baseline comfort level:   Encourage patient to monitor pain and request assistance   Administer analgesics based on type and severity of pain and evaluate response   Implement non-pharmacological measures as appropriate and evaluate response   Assess pain using appropriate pain scale     Problem: Chronic Conditions and Co-morbidities  Goal: Patient's chronic conditions and co-morbidity symptoms are monitored and maintained or improved  11/1/2023 2327 by Emerald Lockhart RN  Outcome: Progressing  Flowsheets (Taken 11/1/2023 2327)  Care Plan - Patient's Chronic Conditions and Co-Morbidity Symptoms are Monitored and Maintained or Improved:   Monitor and assess patient's chronic conditions and comorbid symptoms for stability, deterioration, or improvement   Collaborate with multidisciplinary team to address chronic and comorbid conditions and prevent exacerbation or deterioration Clothing

## 2023-11-09 ENCOUNTER — TELEPHONE (OUTPATIENT)
Dept: CARDIOLOGY | Age: 53
End: 2023-11-09

## 2023-11-09 DIAGNOSIS — L03.115 CELLULITIS OF RIGHT LEG: Primary | ICD-10-CM

## 2023-11-15 ENCOUNTER — TELEPHONE (OUTPATIENT)
Dept: WOUND CARE | Age: 53
End: 2023-11-15

## 2023-11-15 NOTE — TELEPHONE ENCOUNTER
Called patient to schedule appointment for wound care.   States he's been doing the treatment and doesn't want an appointment at this time

## 2023-11-20 ENCOUNTER — TELEPHONE (OUTPATIENT)
Dept: GASTROENTEROLOGY | Age: 53
End: 2023-11-20

## 2023-11-20 NOTE — TELEPHONE ENCOUNTER
Contacted patient to schedule appt with Lucero CESPEDES. Patient reports being unable to schedule as he plans to talk with PCP and states he would like to redo fit test. Patient advised referral is good for a year, patient states he was recently hospitalized and dealing with cellulitis so now is not a good time for him to schedule.

## 2023-11-21 ENCOUNTER — TELEPHONE (OUTPATIENT)
Dept: CARDIOLOGY | Age: 53
End: 2023-11-21

## 2023-11-21 NOTE — TELEPHONE ENCOUNTER
Mr. Beverly Suazo  called and asks if he should take Diltiazem BID or QD? Chart says BID but bottles says daily. Please advise.  He says his BP and HR has been good

## 2023-11-22 LAB — ECHO BSA: 2.58 M2

## 2023-11-27 PROBLEM — E86.0 DEHYDRATION: Status: RESOLVED | Noted: 2023-10-27 | Resolved: 2023-11-27

## 2023-11-29 ENCOUNTER — OFFICE VISIT (OUTPATIENT)
Dept: CARDIOLOGY | Age: 53
End: 2023-11-29
Payer: COMMERCIAL

## 2023-11-29 VITALS
BODY MASS INDEX: 40.88 KG/M2 | OXYGEN SATURATION: 97 % | HEART RATE: 82 BPM | WEIGHT: 292 LBS | RESPIRATION RATE: 18 BRPM | SYSTOLIC BLOOD PRESSURE: 142 MMHG | DIASTOLIC BLOOD PRESSURE: 87 MMHG | HEIGHT: 71 IN

## 2023-11-29 DIAGNOSIS — I48.0 PAF (PAROXYSMAL ATRIAL FIBRILLATION) (HCC): Primary | ICD-10-CM

## 2023-11-29 DIAGNOSIS — E78.2 MIXED HYPERLIPIDEMIA: ICD-10-CM

## 2023-11-29 DIAGNOSIS — E66.01 MORBID OBESITY (HCC): ICD-10-CM

## 2023-11-29 DIAGNOSIS — Z79.01 CHRONIC ANTICOAGULATION: ICD-10-CM

## 2023-11-29 DIAGNOSIS — I48.4 ATYPICAL ATRIAL FLUTTER (HCC): ICD-10-CM

## 2023-11-29 DIAGNOSIS — I50.42 CHRONIC COMBINED SYSTOLIC AND DIASTOLIC CONGESTIVE HEART FAILURE (HCC): ICD-10-CM

## 2023-11-29 DIAGNOSIS — Z09 HOSPITAL DISCHARGE FOLLOW-UP: ICD-10-CM

## 2023-11-29 DIAGNOSIS — G47.33 OSA (OBSTRUCTIVE SLEEP APNEA): ICD-10-CM

## 2023-11-29 DIAGNOSIS — L03.115 CELLULITIS OF RIGHT LEG: ICD-10-CM

## 2023-11-29 DIAGNOSIS — I10 ESSENTIAL HYPERTENSION: ICD-10-CM

## 2023-11-29 PROCEDURE — 3077F SYST BP >= 140 MM HG: CPT | Performed by: INTERNAL MEDICINE

## 2023-11-29 PROCEDURE — 1111F DSCHRG MED/CURRENT MED MERGE: CPT | Performed by: INTERNAL MEDICINE

## 2023-11-29 PROCEDURE — 99214 OFFICE O/P EST MOD 30 MIN: CPT | Performed by: INTERNAL MEDICINE

## 2023-11-29 PROCEDURE — 3079F DIAST BP 80-89 MM HG: CPT | Performed by: INTERNAL MEDICINE

## 2023-11-29 RX ORDER — METOPROLOL TARTRATE 100 MG/1
100 TABLET ORAL 2 TIMES DAILY
Qty: 180 TABLET | Refills: 3 | Status: SHIPPED | OUTPATIENT
Start: 2023-11-29

## 2023-11-29 RX ORDER — DILTIAZEM HYDROCHLORIDE 120 MG/1
120 CAPSULE, EXTENDED RELEASE ORAL 2 TIMES DAILY
Qty: 180 CAPSULE | Refills: 3 | Status: SHIPPED | OUTPATIENT
Start: 2023-11-29

## 2023-11-29 RX ORDER — DILTIAZEM HYDROCHLORIDE 120 MG/1
120 CAPSULE, EXTENDED RELEASE ORAL 2 TIMES DAILY
COMMUNITY
End: 2023-11-29 | Stop reason: SDUPTHER

## 2023-11-29 RX ORDER — AMIODARONE HYDROCHLORIDE 200 MG/1
TABLET ORAL
COMMUNITY
Start: 2023-11-11 | End: 2023-11-29 | Stop reason: SDUPTHER

## 2023-11-29 RX ORDER — AMIODARONE HYDROCHLORIDE 200 MG/1
200 TABLET ORAL DAILY
Qty: 90 TABLET | Refills: 3 | Status: SHIPPED | OUTPATIENT
Start: 2023-11-29

## 2023-11-29 NOTE — PATIENT INSTRUCTIONS
SURVEY:    You may be receiving a survey from [employer] regarding your visit today. Please complete the survey to enable us to provide the highest quality of care to you and your family. If you cannot score us a very good on any question, please call the office to discuss how we could have made your experience a very good one. Thank you.

## 2023-12-05 RX ORDER — METOPROLOL TARTRATE 100 MG/1
100 TABLET ORAL 2 TIMES DAILY
Qty: 180 TABLET | Refills: 3 | OUTPATIENT
Start: 2023-12-05

## 2023-12-06 ENCOUNTER — TELEPHONE (OUTPATIENT)
Dept: GASTROENTEROLOGY | Age: 53
End: 2023-12-06

## 2023-12-12 ENCOUNTER — HOSPITAL ENCOUNTER (OUTPATIENT)
Dept: GENERAL RADIOLOGY | Age: 53
Discharge: HOME | End: 2023-12-14
Payer: COMMERCIAL

## 2023-12-12 ENCOUNTER — TELEPHONE (OUTPATIENT)
Dept: GASTROENTEROLOGY | Age: 53
End: 2023-12-12

## 2023-12-12 ENCOUNTER — HOSPITAL ENCOUNTER (OUTPATIENT)
Age: 53
Discharge: HOME | End: 2023-12-14
Payer: COMMERCIAL

## 2023-12-12 DIAGNOSIS — M54.16 LUMBAR RADICULOPATHY: ICD-10-CM

## 2023-12-12 PROCEDURE — 72110 X-RAY EXAM L-2 SPINE 4/>VWS: CPT

## 2023-12-27 ENCOUNTER — TELEPHONE (OUTPATIENT)
Dept: CARDIOLOGY | Age: 53
End: 2023-12-27

## 2023-12-27 DIAGNOSIS — Z79.01 CHRONIC ANTICOAGULATION: ICD-10-CM

## 2023-12-27 DIAGNOSIS — I48.0 PAF (PAROXYSMAL ATRIAL FIBRILLATION) (HCC): Primary | ICD-10-CM

## 2023-12-27 DIAGNOSIS — I48.4 ATYPICAL ATRIAL FLUTTER (HCC): ICD-10-CM

## 2023-12-27 NOTE — TELEPHONE ENCOUNTER
Timo called in to report that he has still not heard anything from Dr. Verdugo's office. You stated in your last note that you would call for an afib/aflutter ablation after he completed healed from his right leg cellulitis. I placed referral and faxed it since it doesn't look like that was completed. Please advise.

## 2024-01-05 NOTE — TELEPHONE ENCOUNTER
Thank you so much for the update.  I will call Dr. Verdugo to facilitate the ablation procedure.  Thank you

## 2024-01-26 ENCOUNTER — TELEPHONE (OUTPATIENT)
Dept: CARDIOLOGY | Age: 54
End: 2024-01-26

## 2024-01-26 NOTE — TELEPHONE ENCOUNTER
Mr. Gaspar called into the office to let us know that his ablation is scheduled to be done at the [facility] on 2/15 at 2 PM.     Thanks!

## 2024-03-04 RX ORDER — METOPROLOL TARTRATE 100 MG/1
100 TABLET ORAL 2 TIMES DAILY
Qty: 180 TABLET | Refills: 3 | Status: SHIPPED | OUTPATIENT
Start: 2024-03-04

## 2024-03-06 ENCOUNTER — OFFICE VISIT (OUTPATIENT)
Dept: CARDIOLOGY | Age: 54
End: 2024-03-06
Payer: COMMERCIAL

## 2024-03-06 ENCOUNTER — HOSPITAL ENCOUNTER (OUTPATIENT)
Age: 54
Discharge: HOME | End: 2024-03-08
Payer: COMMERCIAL

## 2024-03-06 VITALS
HEART RATE: 86 BPM | OXYGEN SATURATION: 95 % | RESPIRATION RATE: 18 BRPM | WEIGHT: 298 LBS | HEIGHT: 70 IN | DIASTOLIC BLOOD PRESSURE: 64 MMHG | BODY MASS INDEX: 42.66 KG/M2 | SYSTOLIC BLOOD PRESSURE: 125 MMHG

## 2024-03-06 DIAGNOSIS — E11.3213 TYPE 2 DIABETES MELLITUS WITH BOTH EYES AFFECTED BY MILD NONPROLIFERATIVE RETINOPATHY AND MACULAR EDEMA, WITHOUT LONG-TERM CURRENT USE OF INSULIN (HCC): ICD-10-CM

## 2024-03-06 DIAGNOSIS — I48.4 ATYPICAL ATRIAL FLUTTER (HCC): ICD-10-CM

## 2024-03-06 DIAGNOSIS — Z79.01 CHRONIC ANTICOAGULATION: ICD-10-CM

## 2024-03-06 DIAGNOSIS — Z78.9 INTOLERANCE OF CONTINUOUS POSITIVE AIRWAY PRESSURE (CPAP) VENTILATION: ICD-10-CM

## 2024-03-06 DIAGNOSIS — I50.42 CHRONIC COMBINED SYSTOLIC AND DIASTOLIC CONGESTIVE HEART FAILURE (HCC): ICD-10-CM

## 2024-03-06 DIAGNOSIS — E66.01 MORBID OBESITY (HCC): ICD-10-CM

## 2024-03-06 DIAGNOSIS — I10 ESSENTIAL HYPERTENSION: ICD-10-CM

## 2024-03-06 DIAGNOSIS — I48.0 PAF (PAROXYSMAL ATRIAL FIBRILLATION) (HCC): ICD-10-CM

## 2024-03-06 DIAGNOSIS — E78.2 MIXED HYPERLIPIDEMIA: ICD-10-CM

## 2024-03-06 DIAGNOSIS — I48.0 PAF (PAROXYSMAL ATRIAL FIBRILLATION) (HCC): Primary | ICD-10-CM

## 2024-03-06 DIAGNOSIS — G47.33 OSA (OBSTRUCTIVE SLEEP APNEA): ICD-10-CM

## 2024-03-06 DIAGNOSIS — Z79.899 ON AMIODARONE THERAPY: ICD-10-CM

## 2024-03-06 LAB — ECHO BSA: 2.58 M2

## 2024-03-06 PROCEDURE — 3078F DIAST BP <80 MM HG: CPT | Performed by: INTERNAL MEDICINE

## 2024-03-06 PROCEDURE — 3074F SYST BP LT 130 MM HG: CPT | Performed by: INTERNAL MEDICINE

## 2024-03-06 PROCEDURE — 99214 OFFICE O/P EST MOD 30 MIN: CPT | Performed by: INTERNAL MEDICINE

## 2024-03-06 PROCEDURE — 93000 ELECTROCARDIOGRAM COMPLETE: CPT | Performed by: INTERNAL MEDICINE

## 2024-03-06 PROCEDURE — 93243 EXT ECG>48HR<7D SCAN A/R: CPT

## 2024-03-06 NOTE — PROGRESS NOTES
I, Indy Garza am scribing for and in the presence of Ofe Bates MD, F.A.C.C..    Patient: Timo Gaspar  : 1970  Date of Visit: 2024    REASON FOR VISIT / CONSULTATION: Atrial Fibrillation (HX: PAF. Pt reports that he is doing fairly ok but says that he has been having trouble with low bps-and has difficulty with vision and hearing when this occurs. Denies CP, SOB, palpaitions, Dizziness, lightheadedness. Increased 6 pounds since last visit. DR Verdugo is supposed to be calling to schedule ablation. )    History of Present Illness:        Dear Jos, Kolton Rodas, APRN - CNP    I had the pleasure of seeing Timo Gaspar today. Mr. Gaspar is a 53 y.o. male with a history of congestive heart failure and atrial fibrillation.     He was admitted to the hospital on 2019 for shortness of breath. He was than found to be in atrial fibrillation with rapid ventricular response.  He was also found to have low ejection fraction leading to cardiac catheterization.    Cardiac cath on 2019 showed no significant epicardial CAD confirming the diagnosis of tachycardia induced cardiomyopathy.    Patient underwent cardioversion on 2019, only maintained sinus rhythm for few minutes. Subsequently loaded with amiodarone and discharged on Eliquis 5 mg twice daily in addition to metoprolol 50 mg twice daily.    Cardioversion done again on 2019 successful cardioversion to NSR with  200J biphasic. However, within about 1 minute, the patient went back into  atrial fibrillation with RVR.     Most recent ejection fraction by echo on 2019 was 40%.  Mild LVH. No significant valvular abnormalities.    He underwent pulmonary vein isolation by Dr. Sterling on 2019. He reported being out of rhythm few times after the ablation.  He has been treated with amiodarone since ablation procedure.  He told me that he was only out of rhythm for few days after the ablation.  He now knows when he is in A. fib and

## 2024-03-11 LAB — ECHO BSA: 2.58 M2

## 2024-03-12 ENCOUNTER — TELEPHONE (OUTPATIENT)
Dept: CARDIOLOGY | Age: 54
End: 2024-03-12

## 2024-03-12 NOTE — TELEPHONE ENCOUNTER
----- Message from Ofe Bates MD sent at 3/12/2024 10:46 AM EDT -----  He is in and out of atrial fibrillation.  I contacted Dr. Verdugo and they will reach out to him sooner for a redo ablation.  Continue current therapy and follow-up.  Please call with questions and/or concerns.

## 2024-06-06 ENCOUNTER — OFFICE VISIT (OUTPATIENT)
Dept: CARDIOLOGY | Age: 54
End: 2024-06-06
Payer: COMMERCIAL

## 2024-06-06 VITALS
HEART RATE: 87 BPM | HEIGHT: 70 IN | DIASTOLIC BLOOD PRESSURE: 72 MMHG | BODY MASS INDEX: 43.81 KG/M2 | OXYGEN SATURATION: 98 % | RESPIRATION RATE: 18 BRPM | WEIGHT: 306 LBS | SYSTOLIC BLOOD PRESSURE: 119 MMHG

## 2024-06-06 DIAGNOSIS — G47.33 OSA (OBSTRUCTIVE SLEEP APNEA): ICD-10-CM

## 2024-06-06 DIAGNOSIS — E66.01 MORBID OBESITY (HCC): ICD-10-CM

## 2024-06-06 DIAGNOSIS — I50.42 CHRONIC COMBINED SYSTOLIC AND DIASTOLIC CONGESTIVE HEART FAILURE (HCC): ICD-10-CM

## 2024-06-06 DIAGNOSIS — E11.3213 TYPE 2 DIABETES MELLITUS WITH BOTH EYES AFFECTED BY MILD NONPROLIFERATIVE RETINOPATHY AND MACULAR EDEMA, WITHOUT LONG-TERM CURRENT USE OF INSULIN (HCC): ICD-10-CM

## 2024-06-06 DIAGNOSIS — L03.115 CELLULITIS OF RIGHT LEG: ICD-10-CM

## 2024-06-06 DIAGNOSIS — E78.2 MIXED HYPERLIPIDEMIA: ICD-10-CM

## 2024-06-06 DIAGNOSIS — I48.0 PAF (PAROXYSMAL ATRIAL FIBRILLATION) (HCC): Primary | ICD-10-CM

## 2024-06-06 DIAGNOSIS — I10 ESSENTIAL HYPERTENSION: ICD-10-CM

## 2024-06-06 PROCEDURE — 99214 OFFICE O/P EST MOD 30 MIN: CPT | Performed by: INTERNAL MEDICINE

## 2024-06-06 PROCEDURE — 3078F DIAST BP <80 MM HG: CPT | Performed by: INTERNAL MEDICINE

## 2024-06-06 PROCEDURE — 3074F SYST BP LT 130 MM HG: CPT | Performed by: INTERNAL MEDICINE

## 2024-06-06 NOTE — PROGRESS NOTES
questions were answered.    The documentation recorded by the scribe, accurately and completely reflects the services I personally performed and the decisions made by me. Ofe Bates MD, F.A.C.C. June 6, 2024

## 2024-08-28 DIAGNOSIS — I50.42 CHRONIC COMBINED SYSTOLIC AND DIASTOLIC CONGESTIVE HEART FAILURE (HCC): ICD-10-CM

## 2024-08-28 DIAGNOSIS — I10 ESSENTIAL HYPERTENSION: ICD-10-CM

## 2024-08-29 RX ORDER — FUROSEMIDE 40 MG
TABLET ORAL
Qty: 180 TABLET | Refills: 3 | Status: SHIPPED | OUTPATIENT
Start: 2024-08-29

## 2024-10-08 ENCOUNTER — OFFICE VISIT (OUTPATIENT)
Dept: CARDIOLOGY | Age: 54
End: 2024-10-08
Payer: COMMERCIAL

## 2024-10-08 VITALS
DIASTOLIC BLOOD PRESSURE: 79 MMHG | OXYGEN SATURATION: 98 % | HEART RATE: 154 BPM | SYSTOLIC BLOOD PRESSURE: 114 MMHG | WEIGHT: 301 LBS | BODY MASS INDEX: 43.09 KG/M2 | RESPIRATION RATE: 18 BRPM | HEIGHT: 70 IN

## 2024-10-08 DIAGNOSIS — Z98.890 S/P ABLATION OF ATRIAL FIBRILLATION: ICD-10-CM

## 2024-10-08 DIAGNOSIS — E78.2 MIXED HYPERLIPIDEMIA: ICD-10-CM

## 2024-10-08 DIAGNOSIS — I42.8 NICM (NONISCHEMIC CARDIOMYOPATHY) (HCC): ICD-10-CM

## 2024-10-08 DIAGNOSIS — E66.01 MORBID OBESITY: ICD-10-CM

## 2024-10-08 DIAGNOSIS — I10 ESSENTIAL HYPERTENSION: ICD-10-CM

## 2024-10-08 DIAGNOSIS — Z79.01 CHRONIC ANTICOAGULATION: ICD-10-CM

## 2024-10-08 DIAGNOSIS — G47.33 OSA (OBSTRUCTIVE SLEEP APNEA): ICD-10-CM

## 2024-10-08 DIAGNOSIS — Z86.79 S/P ABLATION OF ATRIAL FIBRILLATION: ICD-10-CM

## 2024-10-08 DIAGNOSIS — I48.92 ATRIAL FLUTTER, UNSPECIFIED TYPE (HCC): ICD-10-CM

## 2024-10-08 DIAGNOSIS — I48.91 ATRIAL FIBRILLATION, UNSPECIFIED TYPE (HCC): ICD-10-CM

## 2024-10-08 DIAGNOSIS — I48.0 PAF (PAROXYSMAL ATRIAL FIBRILLATION) (HCC): Primary | ICD-10-CM

## 2024-10-08 DIAGNOSIS — I50.42 CHRONIC COMBINED SYSTOLIC AND DIASTOLIC CONGESTIVE HEART FAILURE (HCC): ICD-10-CM

## 2024-10-08 PROCEDURE — 3078F DIAST BP <80 MM HG: CPT | Performed by: PHYSICIAN ASSISTANT

## 2024-10-08 PROCEDURE — 99215 OFFICE O/P EST HI 40 MIN: CPT | Performed by: PHYSICIAN ASSISTANT

## 2024-10-08 PROCEDURE — 3074F SYST BP LT 130 MM HG: CPT | Performed by: PHYSICIAN ASSISTANT

## 2024-10-08 PROCEDURE — 93000 ELECTROCARDIOGRAM COMPLETE: CPT | Performed by: INTERNAL MEDICINE

## 2024-10-08 RX ORDER — DIGOXIN 125 MCG
125 TABLET ORAL DAILY
Qty: 30 TABLET | Refills: 3 | Status: SHIPPED | OUTPATIENT
Start: 2024-10-08

## 2024-10-08 RX ORDER — METOPROLOL TARTRATE 100 MG
100 TABLET ORAL 2 TIMES DAILY
Qty: 180 TABLET | Refills: 3 | Status: SHIPPED | OUTPATIENT
Start: 2024-10-08

## 2024-10-08 NOTE — PROGRESS NOTES
I, Mitali Shelby am scribing for and in the presence of Ofe Bates MD, F.A.C.C..    Patient: Timo Gaspar  : 1970  Date of Visit: 2024    REASON FOR VISIT / CONSULTATION: Follow-up (Hx: PAF, Chr. Anticoag, CHF, HTN, HLD, Obesity, DIAZ, Diabetes. Pt is here for 3 month follow up. He states he is doing well. Denies cp, palps, sob, dizziness)    History of Present Illness:        Dear Jos, Kolton Rodas, APRN - CNP    I had the pleasure of seeing Timo Gaspar today. Mr. Gaspar is a 54 y.o. male with a history of congestive heart failure and atrial fibrillation.     He was admitted to the hospital on 2019 for shortness of breath. He was than found to be in atrial fibrillation with rapid ventricular response.  He was also found to have low ejection fraction leading to cardiac catheterization.    Cardiac cath on 2019 showed no significant epicardial CAD confirming the diagnosis of tachycardia induced cardiomyopathy.    Patient underwent cardioversion on 2019, only maintained sinus rhythm for few minutes. Subsequently loaded with amiodarone and discharged on Eliquis 5 mg twice daily in addition to metoprolol 50 mg twice daily.    Cardioversion done again on 2019 successful cardioversion to NSR with  200J biphasic. However, within about 1 minute, the patient went back into  atrial fibrillation with RVR.     Most recent ejection fraction by echo on 2019 was 40%.  Mild LVH. No significant valvular abnormalities.    He underwent pulmonary vein isolation by Dr. Sterling on 2019. He reported being out of rhythm few times after the ablation.  He has been treated with amiodarone since ablation procedure.  He told me that he was only out of rhythm for few days after the ablation.  He now knows when he is in A. fib and reported that he is maintaining his sinus rhythm for the past 6 months or so.    EKG done in office 20: Normal sinus rhythm.    Echocardiogram done on 2020 
guarantee my ability to follow up with them on their results. , I ordered a echocardiogram to better assess for the etiology of this problem and to help guide future management, and I ordered an EVENT MONITOR to try and pinpoint the etiology of their symptoms.  I asked him to call if symptoms change or worsen, please call if he changes his mind about having a cardioversion done.  Additional Testing List: Cardioversion: Because of this, I spent about 10 minutes discussing the multiple treatment options including rate control and rhythm control as well as medication and ablation therapies. In the end I recommended that they consider proceeding with a cardioversion to see if this could help improve their symptoms. I also discussed the risks, benefits, and alternatives of the procedure including the risk of arrythmia, stroke, heart attack, death, or the need for further procedures.  I also discussed the alternate treatment strategy of simple medical management without cardioversion and just trying to maximize things with medications. The patient verbalized understanding of the risks benefits and stated that they DO NOT want to proceed with the cardioversion.    Chronic combined heart failure: Nonischemic Cardiomyopathy: Tachycardia induced cardiomyopathy, ejection fraction improved to 55% seen in Echo from 8/2020. Currently no signs of volume overload.   Beta Blocker: INCREASE to Metoprolol tartrate (Lopressor) 100 mg bid. I also discussed the potential side effects of this medication including lightheadedness and dizziness and instructed them to stop the medication of this occurs and call our office if this occurs.   ACE Inibitor/ARB: Continue lisinopril 5 mg daily.  START Digoxin 125 MCG.  Diuretics: Continue furosemide (Lasix) 40 mg 2 times daily.  Continue Jardiance 10 mg daily.  Heart failure counseling: I advised them to try and keep their legs up whenever possible and to limit salt in their diet.     Essential

## 2024-10-09 ENCOUNTER — HOSPITAL ENCOUNTER (OUTPATIENT)
Age: 54
Discharge: HOME | End: 2024-10-11
Payer: COMMERCIAL

## 2024-10-09 ENCOUNTER — HOSPITAL ENCOUNTER (OUTPATIENT)
Age: 54
Discharge: HOME | End: 2024-10-09
Payer: COMMERCIAL

## 2024-10-09 VITALS
DIASTOLIC BLOOD PRESSURE: 79 MMHG | HEIGHT: 70 IN | SYSTOLIC BLOOD PRESSURE: 114 MMHG | BODY MASS INDEX: 43.08 KG/M2 | WEIGHT: 300.93 LBS

## 2024-10-09 DIAGNOSIS — G47.33 OSA (OBSTRUCTIVE SLEEP APNEA): ICD-10-CM

## 2024-10-09 DIAGNOSIS — E78.2 MIXED HYPERLIPIDEMIA: ICD-10-CM

## 2024-10-09 DIAGNOSIS — Z79.01 CHRONIC ANTICOAGULATION: ICD-10-CM

## 2024-10-09 DIAGNOSIS — I48.91 ATRIAL FIBRILLATION, UNSPECIFIED TYPE (HCC): ICD-10-CM

## 2024-10-09 DIAGNOSIS — I48.91 ATRIAL FIBRILLATION WITH RVR (HCC): ICD-10-CM

## 2024-10-09 DIAGNOSIS — I50.42 CHRONIC COMBINED SYSTOLIC AND DIASTOLIC CONGESTIVE HEART FAILURE (HCC): ICD-10-CM

## 2024-10-09 DIAGNOSIS — I10 ESSENTIAL HYPERTENSION: ICD-10-CM

## 2024-10-09 DIAGNOSIS — E66.01 MORBID OBESITY: ICD-10-CM

## 2024-10-09 DIAGNOSIS — E11.29 TYPE 2 DIABETES MELLITUS WITH MICROALBUMINURIA, WITHOUT LONG-TERM CURRENT USE OF INSULIN (HCC): ICD-10-CM

## 2024-10-09 DIAGNOSIS — I48.0 PAF (PAROXYSMAL ATRIAL FIBRILLATION) (HCC): ICD-10-CM

## 2024-10-09 DIAGNOSIS — Z98.890 S/P ABLATION OF ATRIAL FIBRILLATION: ICD-10-CM

## 2024-10-09 DIAGNOSIS — I48.92 ATRIAL FLUTTER, UNSPECIFIED TYPE (HCC): ICD-10-CM

## 2024-10-09 DIAGNOSIS — R80.9 TYPE 2 DIABETES MELLITUS WITH MICROALBUMINURIA, WITHOUT LONG-TERM CURRENT USE OF INSULIN (HCC): ICD-10-CM

## 2024-10-09 DIAGNOSIS — Z86.79 S/P ABLATION OF ATRIAL FIBRILLATION: ICD-10-CM

## 2024-10-09 DIAGNOSIS — I42.8 NICM (NONISCHEMIC CARDIOMYOPATHY) (HCC): ICD-10-CM

## 2024-10-09 DIAGNOSIS — I47.10 SVT (SUPRAVENTRICULAR TACHYCARDIA) (HCC): ICD-10-CM

## 2024-10-09 LAB
ALBUMIN SERPL-MCNC: 4.1 G/DL (ref 3.5–5.2)
ALBUMIN/GLOB SERPL: 1.2 {RATIO} (ref 1–2.5)
ALP SERPL-CCNC: 82 U/L (ref 40–129)
ALT SERPL-CCNC: 50 U/L (ref 10–50)
ANION GAP SERPL CALCULATED.3IONS-SCNC: 18 MMOL/L (ref 9–16)
AST SERPL-CCNC: 54 U/L (ref 10–50)
BILIRUB SERPL-MCNC: 0.5 MG/DL (ref 0–1.2)
BNP SERPL-MCNC: 1619 PG/ML (ref 0–125)
BUN SERPL-MCNC: 21 MG/DL (ref 6–20)
BUN/CREAT SERPL: 23 (ref 9–20)
CALCIUM SERPL-MCNC: 9.7 MG/DL (ref 8.6–10.4)
CHLORIDE SERPL-SCNC: 95 MMOL/L (ref 98–107)
CO2 SERPL-SCNC: 24 MMOL/L (ref 20–31)
CREAT SERPL-MCNC: 0.9 MG/DL (ref 0.7–1.2)
ECHO AO ROOT DIAM: 2 CM
ECHO AO ROOT INDEX: 0.81 CM/M2
ECHO AO SINUS VALSALVA DIAM: 3.3 CM
ECHO AO SINUS VALSALVA INDEX: 1.33 CM/M2
ECHO AO ST JNCT DIAM: 2.1 CM
ECHO BSA: 2.6 M2
ECHO BSA: 2.6 M2
ECHO LV EDV A2C: 80 ML
ECHO LV EDV A4C: 95 ML
ECHO LV EDV INDEX A4C: 38 ML/M2
ECHO LV EDV NDEX A2C: 32 ML/M2
ECHO LV EJECTION FRACTION A2C: 64 %
ECHO LV EJECTION FRACTION A4C: 59 %
ECHO LV EJECTION FRACTION BIPLANE: 62 % (ref 55–100)
ECHO LV ESV A2C: 29 ML
ECHO LV ESV A4C: 39 ML
ECHO LV ESV INDEX A2C: 12 ML/M2
ECHO LV ESV INDEX A4C: 16 ML/M2
ECHO LV FRACTIONAL SHORTENING: 32 % (ref 28–44)
ECHO LV INTERNAL DIMENSION DIASTOLE INDEX: 1.53 CM/M2
ECHO LV INTERNAL DIMENSION DIASTOLIC: 3.8 CM (ref 4.2–5.9)
ECHO LV INTERNAL DIMENSION SYSTOLIC INDEX: 1.05 CM/M2
ECHO LV INTERNAL DIMENSION SYSTOLIC: 2.6 CM
ECHO LV IVSD: 1.4 CM (ref 0.6–1)
ECHO LV MASS 2D: 194.1 G (ref 88–224)
ECHO LV MASS INDEX 2D: 78.3 G/M2 (ref 49–115)
ECHO LV POSTERIOR WALL DIASTOLIC: 1.4 CM (ref 0.6–1)
ECHO LV RELATIVE WALL THICKNESS RATIO: 0.74
ECHO PV MAX VELOCITY: 0.8 M/S
ECHO PV PEAK GRADIENT: 3 MMHG
ERYTHROCYTE [DISTWIDTH] IN BLOOD BY AUTOMATED COUNT: 14.4 % (ref 11.8–14.4)
EST. AVERAGE GLUCOSE BLD GHB EST-MCNC: 163 MG/DL
GFR, ESTIMATED: >90 ML/MIN/1.73M2
GLUCOSE SERPL-MCNC: 117 MG/DL (ref 74–99)
HBA1C MFR BLD: 7.3 % (ref 4–6)
HCT VFR BLD AUTO: 48.4 % (ref 40.7–50.3)
HGB BLD-MCNC: 15.7 G/DL (ref 13–17)
MCH RBC QN AUTO: 33.5 PG (ref 25.2–33.5)
MCHC RBC AUTO-ENTMCNC: 32.4 G/DL (ref 28.4–34.8)
MCV RBC AUTO: 103.4 FL (ref 82.6–102.9)
NRBC BLD-RTO: 0 PER 100 WBC
PLATELET # BLD AUTO: 202 K/UL (ref 138–453)
PMV BLD AUTO: 11.8 FL (ref 8.1–13.5)
POTASSIUM SERPL-SCNC: 5 MMOL/L (ref 3.7–5.3)
PROT SERPL-MCNC: 7.7 G/DL (ref 6.6–8.7)
RBC # BLD AUTO: 4.68 M/UL (ref 4.21–5.77)
SODIUM SERPL-SCNC: 137 MMOL/L (ref 136–145)
TSH SERPL DL<=0.05 MIU/L-ACNC: 2.65 UIU/ML (ref 0.27–4.2)
WBC OTHER # BLD: 6.5 K/UL (ref 3.5–11.3)

## 2024-10-09 PROCEDURE — 93243 EXT ECG>48HR<7D SCAN A/R: CPT

## 2024-10-09 PROCEDURE — 2580000003 HC RX 258: Performed by: FAMILY MEDICINE

## 2024-10-09 PROCEDURE — 36415 COLL VENOUS BLD VENIPUNCTURE: CPT

## 2024-10-09 PROCEDURE — 83880 ASSAY OF NATRIURETIC PEPTIDE: CPT

## 2024-10-09 PROCEDURE — 84443 ASSAY THYROID STIM HORMONE: CPT

## 2024-10-09 PROCEDURE — 83036 HEMOGLOBIN GLYCOSYLATED A1C: CPT

## 2024-10-09 PROCEDURE — C8924 2D TTE W OR W/O FOL W/CON,FU: HCPCS

## 2024-10-09 PROCEDURE — 80061 LIPID PANEL: CPT

## 2024-10-09 PROCEDURE — 85027 COMPLETE CBC AUTOMATED: CPT

## 2024-10-09 PROCEDURE — 6360000004 HC RX CONTRAST MEDICATION: Performed by: FAMILY MEDICINE

## 2024-10-09 PROCEDURE — G0103 PSA SCREENING: HCPCS

## 2024-10-09 PROCEDURE — 80053 COMPREHEN METABOLIC PANEL: CPT

## 2024-10-09 RX ADMIN — PERFLUTREN 10 ML: 6.52 INJECTION, SUSPENSION INTRAVENOUS at 14:53

## 2024-10-10 ENCOUNTER — TELEPHONE (OUTPATIENT)
Dept: CARDIOLOGY | Age: 54
End: 2024-10-10

## 2024-10-10 ENCOUNTER — HOSPITAL ENCOUNTER (OUTPATIENT)
Age: 54
Setting detail: SPECIMEN
Discharge: HOME | End: 2024-10-10
Payer: COMMERCIAL

## 2024-10-10 DIAGNOSIS — I48.91 ATRIAL FIBRILLATION WITH RVR (HCC): ICD-10-CM

## 2024-10-10 DIAGNOSIS — R80.9 TYPE 2 DIABETES MELLITUS WITH MICROALBUMINURIA, WITHOUT LONG-TERM CURRENT USE OF INSULIN (HCC): ICD-10-CM

## 2024-10-10 DIAGNOSIS — E11.29 TYPE 2 DIABETES MELLITUS WITH MICROALBUMINURIA, WITHOUT LONG-TERM CURRENT USE OF INSULIN (HCC): ICD-10-CM

## 2024-10-10 DIAGNOSIS — Z00.00 WELLNESS EXAMINATION: ICD-10-CM

## 2024-10-10 LAB
CHOLEST SERPL-MCNC: 145 MG/DL (ref 0–199)
CHOLESTEROL/HDL RATIO: 3
CREAT UR-MCNC: 75 MG/DL (ref 39–259)
HDLC SERPL-MCNC: 49 MG/DL
LDLC SERPL CALC-MCNC: 72 MG/DL (ref 0–100)
MICROALBUMIN UR-MCNC: 1059 MG/L (ref 0–20)
MICROALBUMIN/CREAT UR-RTO: 1412 MCG/MG CREAT (ref 0–17)
PSA SERPL-MCNC: 0.1 NG/ML (ref 0–4)
TRIGL SERPL-MCNC: 120 MG/DL
VLDLC SERPL CALC-MCNC: 24 MG/DL

## 2024-10-10 PROCEDURE — 82570 ASSAY OF URINE CREATININE: CPT

## 2024-10-10 PROCEDURE — 82043 UR ALBUMIN QUANTITATIVE: CPT

## 2024-10-10 NOTE — RESULT ENCOUNTER NOTE
Please let them know their echo showed heart rate was 150 bpm throughout, EF is 50-55%. His heart strength is decreasing. BNP is elevating showing early signs of heart failure. Can we please bring him in for a repeat EKG next week. If heart rate is still fast we really would like to proceed with a cardioversion or sooner if he would like. We will discuss at follow up appointment. Thanks.

## 2024-10-10 NOTE — TELEPHONE ENCOUNTER
----- Message from Dolores Lowery PA-C sent at 10/10/2024  8:18 AM EDT -----  Please let them know their echo showed heart rate was 150 bpm throughout, EF is 50-55%. His heart strength is decreasing. BNP is elevating showing early signs of heart failure. Can we please bring him in for a repeat EKG next week. If heart rate is still fast we really would like to proceed with a cardioversion or sooner if he would like. We will discuss at follow up appointment. Thanks.

## 2024-10-11 NOTE — RESULT ENCOUNTER NOTE
Please call pt and inform them their labwork results show improving blood sugars  All else stable  Review at f/u

## 2024-10-14 ENCOUNTER — NURSE ONLY (OUTPATIENT)
Dept: CARDIOLOGY | Age: 54
End: 2024-10-14
Payer: COMMERCIAL

## 2024-10-14 VITALS — HEIGHT: 70 IN | WEIGHT: 300 LBS | RESPIRATION RATE: 18 BRPM | BODY MASS INDEX: 42.95 KG/M2

## 2024-10-14 DIAGNOSIS — R94.31 ABNORMAL ECG: Primary | ICD-10-CM

## 2024-10-14 PROCEDURE — 93000 ELECTROCARDIOGRAM COMPLETE: CPT | Performed by: INTERNAL MEDICINE

## 2024-10-14 NOTE — PROGRESS NOTES
EKG still shows atrial flutter.    Additional Testing List: Cardioversion: Because of this, I spent about 10 minutes discussing the multiple treatment options including rate control and rhythm control as well as medication and ablation therapies. In the end I recommended that they consider proceeding with a cardioversion to see if this could help improve their symptoms. I also discussed the risks, benefits, and alternatives of the procedure including the risk of arrythmia, stroke, heart attack, death, or the need for further procedures.  I also discussed the alternate treatment strategy of simple medical management without cardioversion and just trying to maximize things with medications. The patient verbalized understanding of the risks benefits and stated that they would like to proceed with the cardioversion.

## 2024-10-15 ENCOUNTER — HOSPITAL ENCOUNTER (OUTPATIENT)
Age: 54
Discharge: HOME | End: 2024-10-15
Attending: INTERNAL MEDICINE | Admitting: INTERNAL MEDICINE
Payer: COMMERCIAL

## 2024-10-15 ENCOUNTER — ANESTHESIA (OUTPATIENT)
Dept: ICU | Age: 54
End: 2024-10-15
Payer: COMMERCIAL

## 2024-10-15 ENCOUNTER — ANESTHESIA EVENT (OUTPATIENT)
Dept: ICU | Age: 54
End: 2024-10-15
Payer: COMMERCIAL

## 2024-10-15 VITALS
HEIGHT: 70 IN | WEIGHT: 301 LBS | HEART RATE: 88 BPM | BODY MASS INDEX: 43.09 KG/M2 | DIASTOLIC BLOOD PRESSURE: 69 MMHG | OXYGEN SATURATION: 95 % | TEMPERATURE: 97.5 F | RESPIRATION RATE: 12 BRPM | SYSTOLIC BLOOD PRESSURE: 122 MMHG

## 2024-10-15 DIAGNOSIS — Z98.890 S/P ABLATION OF ATRIAL FIBRILLATION: ICD-10-CM

## 2024-10-15 DIAGNOSIS — I50.42 CHRONIC COMBINED SYSTOLIC AND DIASTOLIC CONGESTIVE HEART FAILURE (HCC): ICD-10-CM

## 2024-10-15 DIAGNOSIS — Z79.01 CHRONIC ANTICOAGULATION: ICD-10-CM

## 2024-10-15 DIAGNOSIS — I48.0 PAF (PAROXYSMAL ATRIAL FIBRILLATION) (HCC): Primary | ICD-10-CM

## 2024-10-15 DIAGNOSIS — Z86.79 S/P ABLATION OF ATRIAL FIBRILLATION: ICD-10-CM

## 2024-10-15 DIAGNOSIS — I42.8 NICM (NONISCHEMIC CARDIOMYOPATHY) (HCC): ICD-10-CM

## 2024-10-15 PROCEDURE — 93005 ELECTROCARDIOGRAM TRACING: CPT | Performed by: INTERNAL MEDICINE

## 2024-10-15 PROCEDURE — 92960 CARDIOVERSION ELECTRIC EXT: CPT | Performed by: INTERNAL MEDICINE

## 2024-10-15 PROCEDURE — 6360000002 HC RX W HCPCS: Performed by: NURSE ANESTHETIST, CERTIFIED REGISTERED

## 2024-10-15 PROCEDURE — 3700000000 HC ANESTHESIA ATTENDED CARE: Performed by: NURSE ANESTHETIST, CERTIFIED REGISTERED

## 2024-10-15 PROCEDURE — 2500000003 HC RX 250 WO HCPCS: Performed by: NURSE ANESTHETIST, CERTIFIED REGISTERED

## 2024-10-15 RX ORDER — LIDOCAINE HYDROCHLORIDE 20 MG/ML
INJECTION, SOLUTION INFILTRATION; PERINEURAL
Status: DISCONTINUED | OUTPATIENT
Start: 2024-10-15 | End: 2024-10-15 | Stop reason: SDUPTHER

## 2024-10-15 RX ORDER — PROPOFOL 10 MG/ML
INJECTION, EMULSION INTRAVENOUS
Status: DISCONTINUED | OUTPATIENT
Start: 2024-10-15 | End: 2024-10-15 | Stop reason: SDUPTHER

## 2024-10-15 RX ORDER — LISINOPRIL 10 MG/1
10 TABLET ORAL DAILY
Qty: 30 TABLET | Refills: 2 | Status: SHIPPED | OUTPATIENT
Start: 2024-10-15 | End: 2024-10-17

## 2024-10-15 RX ADMIN — LIDOCAINE HYDROCHLORIDE 40 MG: 20 INJECTION, SOLUTION INFILTRATION; PERINEURAL at 13:13

## 2024-10-15 RX ADMIN — PROPOFOL 80 MG: 10 INJECTION, EMULSION INTRAVENOUS at 13:13

## 2024-10-15 NOTE — PROCEDURES
PROCEDURE NOTE  Date: 10/15/2024   Name: Timo Gaspar  YOB: 1970    Procedures: Elective Cardioversion    Brief Postoperative Note    Mr. Gaspar   YOB: 1970   [id number]      Pre-operative Diagnosis: Atypical atrial flutter with rapid ventricular response.     Post-operative Diagnosis: Successful cardioversion to NSR with 300J biphasic    Anesthesia: General anaesthesia provided by the anaesthesia team.    Surgeons/Assistants: Paulo    Procedure details: The procedure was done in the ICU.  Benefits, risks and alternatives were explained to the patient and he agreed to proceed with cardioversion.  Informed consent obtained.  A separate informed consent obtained by the anesthesia team.    Timeout obtained by the ICU nurse.    Patient was maintained on anticoagulation.  No INGA needed.  The defibrillator pads were attached in anteroposterior position and a single synchronized shock of 300 J delivered in a standard manner.  Patient converted to normal sinus rhythm immediately and maintained sinus rhythm for the 3-hour observation post cardioversion.    Estimated Blood Loss: None    Complications: None    I discussed with him further treatment options for his atrial flutter.  We discussed monitoring via vital connect or loop recorder but patient declined saying that he is monitoring his heart rate and blood pressure and he is doing really well since his last ablation.  We also discussed referral for a repeat ablation particularly because his atypical atrial flutter is very hard for rate control.  Patient wants to wait and he will call me in 1 week with his heart rate and blood pressure log.  Planning to see him after 1 month or sooner if needed.  He has my cell phone number to call if needed.

## 2024-10-15 NOTE — ANESTHESIA PRE PROCEDURE
03:14 PM    LABGLOM >60 11/01/2023 05:40 AM    GLUCOSE 117 10/09/2024 03:14 PM    GLUCOSE 172 04/04/2023 04:28 PM    CALCIUM 9.7 10/09/2024 03:14 PM    BILITOT 0.5 10/09/2024 03:14 PM    ALKPHOS 82 10/09/2024 03:14 PM    AST 54 10/09/2024 03:14 PM    ALT 50 10/09/2024 03:14 PM       POC Tests: No results for input(s): \"POCGLU\", \"POCNA\", \"POCK\", \"POCCL\", \"POCBUN\", \"POCHEMO\", \"POCHCT\" in the last 72 hours.    Coags: No results found for: \"PROTIME\", \"INR\", \"APTT\"    HCG (If Applicable): No results found for: \"PREGTESTUR\", \"PREGSERUM\", \"HCG\", \"HCGQUANT\"     ABGs: No results found for: \"PHART\", \"PO2ART\", \"YHH1YFI\", \"AKX4TWB\", \"BEART\", \"P2ZIBWZI\"     Type & Screen (If Applicable):  No results found for: \"ABORH\", \"LABANTI\"    Drug/Infectious Status (If Applicable):  No results found for: \"HIV\", \"HEPCAB\"    COVID-19 Screening (If Applicable):   Lab Results   Component Value Date/Time    COVID19 Not Detected 10/27/2023 10:02 AM           Anesthesia Evaluation  Patient summary reviewed and Nursing notes reviewed  Airway: Mallampati: III  TM distance: >3 FB   Neck ROM: full  Mouth opening: > = 3 FB   Dental:          Pulmonary:normal exam    (+)     sleep apnea: on CPAP,                                  Cardiovascular:    (+) hypertension: moderate, dysrhythmias: atrial flutter, CHF: no interval change, hyperlipidemia      ECG reviewed  Rhythm: regular  Rate: abnormal      Cleared by cardiology             PE comment: rate 150 bpm, aflutter   Neuro/Psych:   Negative Neuro/Psych ROS              GI/Hepatic/Renal:   (+) morbid obesity          Endo/Other:    (+) DiabetesType II DM.                 Abdominal:   (+) obese          Vascular: negative vascular ROS.         Other Findings:       Anesthesia Plan      general and TIVA     ASA 3       Induction: intravenous.      Anesthetic plan and risks discussed with patient and spouse.                    Sophia Steve, KILLIAN - CRNA   10/15/2024

## 2024-10-15 NOTE — ANESTHESIA POSTPROCEDURE EVALUATION
Department of Anesthesiology  Postprocedure Note    Patient: Timo Gsapar  MRN: [id number]  YOB: 1970  Date of evaluation: 10/15/2024    Procedure Summary       Date: 10/15/24 Room / Location: [facility]    Anesthesia Start: 1310 Anesthesia Stop: 1318    Procedure: Procedure Not Yet Scheduled Diagnosis: atrial flutter    Scheduled Providers: Dr. Bates Responsible Provider: Sophia Steve APRN - CRNA    Anesthesia Type: general, TIVA ASA Status: 3            Anesthesia Type: general, TIVA    Usman Phase I:      Usman Phase II:      Anesthesia Post Evaluation    Patient location during evaluation: PACU  Patient participation: complete - patient participated  Level of consciousness: awake and alert  Pain score: 0  Airway patency: patent  Nausea & Vomiting: no nausea and no vomiting  Cardiovascular status: blood pressure returned to baseline  Respiratory status: acceptable and room air  Hydration status: stable  Pain management: adequate and satisfactory to patient        No notable events documented.

## 2024-10-16 DIAGNOSIS — I48.0 PAF (PAROXYSMAL ATRIAL FIBRILLATION) (HCC): Primary | ICD-10-CM

## 2024-10-16 LAB
ECHO BSA: 2.6 M2
EKG ATRIAL RATE: 151 BPM
EKG ATRIAL RATE: 83 BPM
EKG P AXIS: 62 DEGREES
EKG P AXIS: 91 DEGREES
EKG P-R INTERVAL: 136 MS
EKG P-R INTERVAL: 178 MS
EKG Q-T INTERVAL: 290 MS
EKG Q-T INTERVAL: 372 MS
EKG QRS DURATION: 76 MS
EKG QRS DURATION: 78 MS
EKG QTC CALCULATION (BAZETT): 437 MS
EKG QTC CALCULATION (BAZETT): 459 MS
EKG R AXIS: 45 DEGREES
EKG R AXIS: 48 DEGREES
EKG T AXIS: 31 DEGREES
EKG T AXIS: 40 DEGREES
EKG VENTRICULAR RATE: 151 BPM
EKG VENTRICULAR RATE: 83 BPM

## 2024-10-16 PROCEDURE — 93010 ELECTROCARDIOGRAM REPORT: CPT | Performed by: FAMILY MEDICINE

## 2024-10-17 ENCOUNTER — TELEPHONE (OUTPATIENT)
Dept: CARDIOLOGY | Age: 54
End: 2024-10-17

## 2024-10-17 RX ORDER — LISINOPRIL 5 MG/1
5 TABLET ORAL DAILY
Qty: 90 TABLET | Refills: 3 | Status: SHIPPED | OUTPATIENT
Start: 2024-10-17 | End: 2024-10-18 | Stop reason: SDUPTHER

## 2024-10-17 NOTE — TELEPHONE ENCOUNTER
----- Message from Dolores Lowery PA-C sent at 10/17/2024  8:16 AM EDT -----  Please let them know that their CAM monitor showed baseline rhythm and atrial flutter with variable block, average heart rate is 146 bpm, ranging between 102 and 160 bpm.    Patient status post cardioversion on 10/15/2024, resumed sinus rhythm.    We will discuss at their follow up appointment.

## 2024-10-18 DIAGNOSIS — I48.0 PAF (PAROXYSMAL ATRIAL FIBRILLATION) (HCC): Primary | ICD-10-CM

## 2024-10-18 DIAGNOSIS — I42.8 NICM (NONISCHEMIC CARDIOMYOPATHY) (HCC): ICD-10-CM

## 2024-10-18 RX ORDER — LISINOPRIL 5 MG/1
5 TABLET ORAL DAILY
Qty: 90 TABLET | Refills: 3 | Status: SHIPPED | OUTPATIENT
Start: 2024-10-18

## 2024-11-13 ENCOUNTER — OFFICE VISIT (OUTPATIENT)
Dept: CARDIOLOGY | Age: 54
End: 2024-11-13
Payer: COMMERCIAL

## 2024-11-13 VITALS
RESPIRATION RATE: 18 BRPM | WEIGHT: 305 LBS | BODY MASS INDEX: 43.67 KG/M2 | HEART RATE: 89 BPM | SYSTOLIC BLOOD PRESSURE: 127 MMHG | DIASTOLIC BLOOD PRESSURE: 75 MMHG | HEIGHT: 70 IN

## 2024-11-13 DIAGNOSIS — E66.01 MORBID OBESITY: ICD-10-CM

## 2024-11-13 DIAGNOSIS — E78.2 MIXED HYPERLIPIDEMIA: ICD-10-CM

## 2024-11-13 DIAGNOSIS — I50.42 CHRONIC COMBINED SYSTOLIC AND DIASTOLIC CONGESTIVE HEART FAILURE (HCC): ICD-10-CM

## 2024-11-13 DIAGNOSIS — I48.0 PAF (PAROXYSMAL ATRIAL FIBRILLATION) (HCC): Primary | ICD-10-CM

## 2024-11-13 DIAGNOSIS — G47.33 OSA (OBSTRUCTIVE SLEEP APNEA): ICD-10-CM

## 2024-11-13 DIAGNOSIS — I10 ESSENTIAL HYPERTENSION: ICD-10-CM

## 2024-11-13 PROCEDURE — 93000 ELECTROCARDIOGRAM COMPLETE: CPT | Performed by: INTERNAL MEDICINE

## 2024-11-13 PROCEDURE — 99214 OFFICE O/P EST MOD 30 MIN: CPT | Performed by: INTERNAL MEDICINE

## 2024-11-13 PROCEDURE — 3078F DIAST BP <80 MM HG: CPT | Performed by: INTERNAL MEDICINE

## 2024-11-13 PROCEDURE — 3074F SYST BP LT 130 MM HG: CPT | Performed by: INTERNAL MEDICINE

## 2024-11-13 NOTE — PROGRESS NOTES
I, Aleksandra Cuevas am scribing for and in the presence of Ofe Bates MD, F.A.C.C..    Patient: Timo Gaspar  : 1970  Date of Visit: 2024    REASON FOR VISIT / CONSULTATION: Follow-up (HX: HTN, Afib, CHF. Pt states he is doing well. He states he has been doing well. Denies cp, palps, sob, dizziness. )    History of Present Illness:        Dear Jos, Kolton Rodas, APRN - CNP    I had the pleasure of seeing Timo Gaspar today. Mr. Gaspar is a 54 y.o. male with a history of congestive heart failure and atrial fibrillation.     He was admitted to the hospital on 2019 for shortness of breath. He was than found to be in atrial fibrillation with rapid ventricular response.  He was also found to have low ejection fraction leading to cardiac catheterization.    Cardiac cath on 2019 showed no significant epicardial CAD confirming the diagnosis of tachycardia induced cardiomyopathy.    Patient underwent cardioversion on 2019, only maintained sinus rhythm for few minutes. Subsequently loaded with amiodarone and discharged on Eliquis 5 mg twice daily in addition to metoprolol 50 mg twice daily.    Cardioversion done again on 2019 successful cardioversion to NSR with  200J biphasic. However, within about 1 minute, the patient went back into  atrial fibrillation with RVR.     Most recent ejection fraction by echo on 2019 was 40%.  Mild LVH. No significant valvular abnormalities.    He underwent pulmonary vein isolation by Dr. Sterling on 2019. He reported being out of rhythm few times after the ablation.  He has been treated with amiodarone since ablation procedure.  He told me that he was only out of rhythm for few days after the ablation.  He now knows when he is in A. fib and reported that he is maintaining his sinus rhythm for the past 6 months or so.    EKG done in office 20: Normal sinus rhythm.    Echocardiogram done on 2020 showed an EF of 55% Mild LV

## 2025-01-07 RX ORDER — AMIODARONE HYDROCHLORIDE 200 MG/1
200 TABLET ORAL DAILY
Qty: 90 TABLET | Refills: 3 | Status: SHIPPED | OUTPATIENT
Start: 2025-01-07 | End: 2025-01-08

## 2025-01-07 NOTE — TELEPHONE ENCOUNTER
Mr. Gaspar  called and asked refill for Amiodarone his last note says he is off of it but he currently taking this. Please advise if he should be on it and send refill please

## 2025-01-08 RX ORDER — AMIODARONE HYDROCHLORIDE 200 MG/1
200 TABLET ORAL DAILY
Qty: 30 TABLET | Refills: 3 | Status: SHIPPED | OUTPATIENT
Start: 2025-01-08

## 2025-02-19 ENCOUNTER — OFFICE VISIT (OUTPATIENT)
Dept: CARDIOLOGY | Age: 55
End: 2025-02-19
Payer: COMMERCIAL

## 2025-02-19 VITALS
SYSTOLIC BLOOD PRESSURE: 144 MMHG | RESPIRATION RATE: 18 BRPM | DIASTOLIC BLOOD PRESSURE: 78 MMHG | BODY MASS INDEX: 43.67 KG/M2 | HEIGHT: 70 IN | WEIGHT: 305 LBS | HEART RATE: 79 BPM

## 2025-02-19 DIAGNOSIS — I10 ESSENTIAL HYPERTENSION: ICD-10-CM

## 2025-02-19 DIAGNOSIS — I48.0 PAF (PAROXYSMAL ATRIAL FIBRILLATION) (HCC): Primary | ICD-10-CM

## 2025-02-19 DIAGNOSIS — E78.2 MIXED HYPERLIPIDEMIA: ICD-10-CM

## 2025-02-19 DIAGNOSIS — E66.01 MORBID OBESITY: ICD-10-CM

## 2025-02-19 DIAGNOSIS — I50.42 CHRONIC COMBINED SYSTOLIC AND DIASTOLIC CONGESTIVE HEART FAILURE (HCC): ICD-10-CM

## 2025-02-19 DIAGNOSIS — G47.33 OSA (OBSTRUCTIVE SLEEP APNEA): ICD-10-CM

## 2025-02-19 PROCEDURE — 93000 ELECTROCARDIOGRAM COMPLETE: CPT | Performed by: INTERNAL MEDICINE

## 2025-02-19 PROCEDURE — 99214 OFFICE O/P EST MOD 30 MIN: CPT | Performed by: INTERNAL MEDICINE

## 2025-02-19 PROCEDURE — 3077F SYST BP >= 140 MM HG: CPT | Performed by: INTERNAL MEDICINE

## 2025-02-19 PROCEDURE — 3078F DIAST BP <80 MM HG: CPT | Performed by: INTERNAL MEDICINE

## 2025-02-19 NOTE — PROGRESS NOTES
I, Aleksandra Cuevas am scribing for and in the presence of Ofe Bates MD, F.A.C.C..    Patient: Timo Gaspar  : 1970  Date of Visit: 2025    REASON FOR VISIT / CONSULTATION: Follow-up (HX: HTN, Afib, CHF. Pt states he is doing well. Denies cp, palps, sob, dizziness. )    History of Present Illness:        Dear Jos, Kolton Rodas, APRN - CNP    I had the pleasure of seeing Timo Gaspar today. Mr. Gaspar is a 54 y.o. male with a history of congestive heart failure and atrial fibrillation.     He was admitted to the hospital on 2019 for shortness of breath. He was than found to be in atrial fibrillation with rapid ventricular response.  He was also found to have low ejection fraction leading to cardiac catheterization.    Cardiac cath on 2019 showed no significant epicardial CAD confirming the diagnosis of tachycardia induced cardiomyopathy.    Patient underwent cardioversion on 2019, only maintained sinus rhythm for few minutes. Subsequently loaded with amiodarone and discharged on Eliquis 5 mg twice daily in addition to metoprolol 50 mg twice daily.    Cardioversion done again on 2019 successful cardioversion to NSR with  200J biphasic. However, within about 1 minute, the patient went back into  atrial fibrillation with RVR.     Most recent ejection fraction by echo on 2019 was 40%.  Mild LVH. No significant valvular abnormalities.    He underwent pulmonary vein isolation by Dr. Sterling on 2019. He reported being out of rhythm few times after the ablation.  He has been treated with amiodarone since ablation procedure.  He told me that he was only out of rhythm for few days after the ablation.  He now knows when he is in A. fib and reported that he is maintaining his sinus rhythm for the past 6 months or so.    EKG done in office 20: Normal sinus rhythm.    Echocardiogram done on 2020 showed an EF of 55% Mild LV hypertrophy. Mild diastolic dysfunction is

## 2025-08-19 DIAGNOSIS — E78.2 MIXED HYPERLIPIDEMIA: ICD-10-CM

## 2025-08-19 DIAGNOSIS — I48.0 PAF (PAROXYSMAL ATRIAL FIBRILLATION) (HCC): Primary | ICD-10-CM

## 2025-08-19 DIAGNOSIS — I10 ESSENTIAL HYPERTENSION: ICD-10-CM

## 2025-08-19 DIAGNOSIS — I50.42 CHRONIC COMBINED SYSTOLIC AND DIASTOLIC CONGESTIVE HEART FAILURE (HCC): ICD-10-CM

## 2025-08-19 RX ORDER — AMIODARONE HYDROCHLORIDE 200 MG/1
200 TABLET ORAL DAILY
Qty: 30 TABLET | Refills: 3 | Status: SHIPPED | OUTPATIENT
Start: 2025-08-19 | End: 2025-08-21 | Stop reason: SDUPTHER

## 2025-08-21 ENCOUNTER — OFFICE VISIT (OUTPATIENT)
Dept: CARDIOLOGY | Age: 55
End: 2025-08-21
Payer: COMMERCIAL

## 2025-08-21 VITALS
WEIGHT: 284 LBS | HEART RATE: 69 BPM | RESPIRATION RATE: 18 BRPM | HEIGHT: 70 IN | BODY MASS INDEX: 40.66 KG/M2 | DIASTOLIC BLOOD PRESSURE: 77 MMHG | SYSTOLIC BLOOD PRESSURE: 136 MMHG | OXYGEN SATURATION: 96 %

## 2025-08-21 DIAGNOSIS — Z86.79 S/P ABLATION OF ATRIAL FIBRILLATION: ICD-10-CM

## 2025-08-21 DIAGNOSIS — Z79.01 CHRONIC ANTICOAGULATION: ICD-10-CM

## 2025-08-21 DIAGNOSIS — I10 ESSENTIAL HYPERTENSION: ICD-10-CM

## 2025-08-21 DIAGNOSIS — I50.42 CHRONIC COMBINED SYSTOLIC AND DIASTOLIC CONGESTIVE HEART FAILURE (HCC): ICD-10-CM

## 2025-08-21 DIAGNOSIS — G47.33 OSA (OBSTRUCTIVE SLEEP APNEA): ICD-10-CM

## 2025-08-21 DIAGNOSIS — I48.0 PAF (PAROXYSMAL ATRIAL FIBRILLATION) (HCC): Primary | ICD-10-CM

## 2025-08-21 DIAGNOSIS — E66.01 MORBID OBESITY (HCC): ICD-10-CM

## 2025-08-21 DIAGNOSIS — Z92.89 HISTORY OF CARDIOVERSION: ICD-10-CM

## 2025-08-21 DIAGNOSIS — E78.2 MIXED HYPERLIPIDEMIA: ICD-10-CM

## 2025-08-21 DIAGNOSIS — R79.89 ELEVATED TSH: ICD-10-CM

## 2025-08-21 DIAGNOSIS — Z98.890 S/P ABLATION OF ATRIAL FIBRILLATION: ICD-10-CM

## 2025-08-21 PROCEDURE — 99214 OFFICE O/P EST MOD 30 MIN: CPT | Performed by: NURSE PRACTITIONER

## 2025-08-21 PROCEDURE — 93000 ELECTROCARDIOGRAM COMPLETE: CPT | Performed by: NURSE PRACTITIONER

## 2025-08-21 PROCEDURE — 3078F DIAST BP <80 MM HG: CPT | Performed by: NURSE PRACTITIONER

## 2025-08-21 PROCEDURE — 3075F SYST BP GE 130 - 139MM HG: CPT | Performed by: NURSE PRACTITIONER

## 2025-08-21 RX ORDER — AMIODARONE HYDROCHLORIDE 200 MG/1
200 TABLET ORAL DAILY
Qty: 90 TABLET | Refills: 3 | Status: SHIPPED | OUTPATIENT
Start: 2025-08-21